# Patient Record
Sex: MALE | Race: WHITE | NOT HISPANIC OR LATINO | Employment: OTHER | URBAN - METROPOLITAN AREA
[De-identification: names, ages, dates, MRNs, and addresses within clinical notes are randomized per-mention and may not be internally consistent; named-entity substitution may affect disease eponyms.]

---

## 2017-01-06 DIAGNOSIS — E83.119 HEMOCHROMATOSIS, UNSPECIFIED HEMOCHROMATOSIS TYPE: Primary | ICD-10-CM

## 2017-01-10 ENCOUNTER — APPOINTMENT (OUTPATIENT)
Dept: LAB | Facility: HOSPITAL | Age: 67
End: 2017-01-10

## 2017-01-10 ENCOUNTER — APPOINTMENT (OUTPATIENT)
Dept: ONCOLOGY | Facility: HOSPITAL | Age: 67
End: 2017-01-10

## 2017-02-01 ENCOUNTER — APPOINTMENT (OUTPATIENT)
Dept: LAB | Facility: HOSPITAL | Age: 67
End: 2017-02-01

## 2017-02-01 ENCOUNTER — APPOINTMENT (OUTPATIENT)
Dept: ONCOLOGY | Facility: CLINIC | Age: 67
End: 2017-02-01

## 2017-02-01 ENCOUNTER — APPOINTMENT (OUTPATIENT)
Dept: ONCOLOGY | Facility: HOSPITAL | Age: 67
End: 2017-02-01

## 2017-02-03 ENCOUNTER — CLINICAL SUPPORT (OUTPATIENT)
Dept: ORTHOPEDIC SURGERY | Facility: CLINIC | Age: 67
End: 2017-02-03

## 2017-02-03 VITALS — TEMPERATURE: 97.6 F | BODY MASS INDEX: 37.65 KG/M2 | HEIGHT: 65 IN | WEIGHT: 226 LBS

## 2017-02-03 DIAGNOSIS — M25.562 CHRONIC PAIN OF LEFT KNEE: Primary | ICD-10-CM

## 2017-02-03 DIAGNOSIS — G89.29 CHRONIC PAIN OF LEFT KNEE: Primary | ICD-10-CM

## 2017-02-03 PROCEDURE — 20610 DRAIN/INJ JOINT/BURSA W/O US: CPT | Performed by: ORTHOPAEDIC SURGERY

## 2017-02-03 PROCEDURE — 73560 X-RAY EXAM OF KNEE 1 OR 2: CPT | Performed by: ORTHOPAEDIC SURGERY

## 2017-02-03 PROCEDURE — 73565 X-RAY EXAM OF KNEES: CPT | Performed by: ORTHOPAEDIC SURGERY

## 2017-02-03 RX ORDER — IBUPROFEN 800 MG/1
800 TABLET ORAL EVERY 6 HOURS PRN
COMMUNITY
Start: 2016-12-18 | End: 2019-11-12

## 2017-02-03 RX ORDER — FENOFIBRATE 134 MG/1
CAPSULE ORAL
COMMUNITY
Start: 2016-12-19 | End: 2018-01-25

## 2017-02-03 RX ADMIN — BUPIVACAINE HYDROCHLORIDE 2 ML: 5 INJECTION, SOLUTION PERINEURAL at 09:38

## 2017-02-03 RX ADMIN — LIDOCAINE HYDROCHLORIDE 2 ML: 10 INJECTION, SOLUTION INFILTRATION; PERINEURAL at 09:38

## 2017-02-03 RX ADMIN — METHYLPREDNISOLONE ACETATE 80 MG: 80 INJECTION, SUSPENSION INTRA-ARTICULAR; INTRALESIONAL; INTRAMUSCULAR; SOFT TISSUE at 09:38

## 2017-02-03 NOTE — PROGRESS NOTES
Knee Joint Injection      Patient: Loyd Avalos  YOB: 1950    Chief Complaints: Knee pain left    Subjective:  This problem is not new to this examiner.     History of Present Illness: Pt gets intermittent  injections with good relief. Is here for repeat injection. Understands options. The pain is a generalized joint line tenderness.  It has been progressive in nature but remains intermittent.  Worsened by prolonged standing or walking and squatting activities. Has had improvement in the past with ice/heat, rest, and injections. TIMING:  The pain is described as CHRONIC.  LOCATION: medial joint line tenderness. AGGRAVATING FACTORS:  Is worsened by prolonged standing, sitting, walking and squatting activities.  ASSOCIATED SYMPTOMS:  swelling, tenderness, and aching.      Allergies: No Known Allergies    Medications:   Home Medications:  Current Outpatient Prescriptions on File Prior to Visit   Medication Sig   • allopurinol (ZYLOPRIM) 300 MG tablet    • aspirin 81 MG EC tablet Take 81 mg by mouth daily.   • metFORMIN (GLUCOPHAGE) 500 MG tablet    • metoprolol succinate XL (TOPROL-XL) 25 MG 24 hr tablet    • [DISCONTINUED] fenofibric acid (TRILIPIX) 135 MG capsule delayed-release delayed release capsule      No current facility-administered medications on file prior to visit.      Current Medications:  Scheduled Meds:  Continuous Infusions:  No current facility-administered medications for this visit.   PRN Meds:.    I have reviewed the patient's medical history in detail and updated the computerized patient record.  Review and summarization of old records include:    Past Medical History   Diagnosis Date   • Diabetes    • Heart disease      Status post pacemaker implantation   • Hemochromatosis    • Sleep apnea         Past Surgical History   Procedure Laterality Date   • Knee surgery Right    • Pacemaker implantation  05/2006        Social History     Occupational History   • Retired       Social History Main Topics   • Smoking status: Current Some Day Smoker     Types: Cigars   • Smokeless tobacco: Not on file   • Alcohol use Yes      Comment: Moderate   • Drug use: No   • Sexual activity: Defer    Social History     Social History Narrative        Family History   Problem Relation Age of Onset   • Cancer Other      LYMPH & BREAST   • Diabetes Other    • Hypertension Other    • Heart disease Other    • Breast cancer Mother    • Lymphoma Father    • Hemochromatosis Father        ROS: 14 point review of systems was performed and was negative except for documented findings in HPI and today's encounter.     Allergies: No Known Allergies  Constitutional:  Denies fever, shaking or chills   Eyes:  Denies change in visual acuity   HENT:  Denies nasal congestion or sore throat   Respiratory:  Denies cough or shortness of breath   Cardiovascular:  Denies chest pain or severe LE edema   GI:  Denies abdominal pain, nausea, vomiting, bloody stools or diarrhea   Musculoskeletal:  Numbness, tingling, or loss of motor function only as noted above in history of present illness.  : Denies painful urination or hematuria  Integument:  Denies rash, lesion or ulceration   Neurologic:  Denies headache or focal weakness  Endocrine:  Denies lymphadenopathy  Psych:  Denies confusion or change in mental status   Hem:  Denies active bleeding    Physical Exam:  Body mass index is 37.61 kg/(m^2).  Vitals:    02/03/17 0935   Temp: 97.6 °F (36.4 °C)     Vital Signs:  reviewed  Constitutional: Awake alert and oriented x3, well developed, no acute distress, non-toxic appearance.  EYES: symmetric, sclera clear  ENT:  Normocephalic, Atraumatic.   Respiratory:  No respiratory distress, No wheezing  CV: pulse regular, no palpitations or pallor.  GI:  Abdomen soft, non-tender.   Vascular:  Intact distal pulses, No cyanosis, no signs or symptoms of DVT.  Neurologic: Sensation grossly intact to the involved extremity, No focal  deficits noted.   Neck: No tenderness, Supple.  Integument: warm, dry, no ulcerations.   Psychiatric:  Oriented, no pathological affect.  Musculoskeletal:    Affected knee(s):  Painful gait with a subtle limp, positive for synovitis, swelling, joint effusion with crepitation.  Lachman negative  Posterior drawer negative  Tereza's negative  Patellofemoral grind +  Sensation grossly intact to light touch throughout the lower extremity  Skin is intact  Distal pulses are palpable  No signs or symptoms of DVT        Diagnostic Data:     Imaging was done today and discussed at length with the patient:    Indication: pain related symptoms,  Views: 3V AP, LAT & 40 degree PA left knee(s)   Findings: advanced arthritis  Comparison views: viewed last xray done in the office.     Procedure:  Large Joint Arthrocentesis  Date/Time: 2/3/2017 9:38 AM  Consent given by: patient  Site marked: site marked  Timeout: Immediately prior to procedure a time out was called to verify the correct patient, procedure, equipment, support staff and site/side marked as required   Supporting Documentation  Indications: pain and joint swelling   Procedure Details  Location: knee - L knee  Preparation: Patient was prepped and draped in the usual sterile fashion  Needle size: 22 G  Approach: anterolateral  Medications administered: 2 mL bupivacaine; 2 mL lidocaine 1 %; 80 mg methylPREDNISolone acetate 80 MG/ML  Patient tolerance: patient tolerated the procedure well with no immediate complications          Assessment. Severe osteoarthritis left knee(s).      Plan: Is to proceed with injection  Follow up as indicated.  Ice, elevate, and rest as needed.  Additional interventions include: Cortisone Injection. See procedure note.    2/3/2017

## 2017-02-06 RX ORDER — METHYLPREDNISOLONE ACETATE 80 MG/ML
80 INJECTION, SUSPENSION INTRA-ARTICULAR; INTRALESIONAL; INTRAMUSCULAR; SOFT TISSUE
Status: COMPLETED | OUTPATIENT
Start: 2017-02-03 | End: 2017-02-03

## 2017-02-06 RX ORDER — LIDOCAINE HYDROCHLORIDE 10 MG/ML
2 INJECTION, SOLUTION INFILTRATION; PERINEURAL
Status: COMPLETED | OUTPATIENT
Start: 2017-02-03 | End: 2017-02-03

## 2017-02-06 RX ORDER — BUPIVACAINE HYDROCHLORIDE 5 MG/ML
2 INJECTION, SOLUTION PERINEURAL
Status: COMPLETED | OUTPATIENT
Start: 2017-02-03 | End: 2017-02-03

## 2017-02-08 ENCOUNTER — LAB (OUTPATIENT)
Dept: LAB | Facility: HOSPITAL | Age: 67
End: 2017-02-08

## 2017-02-08 ENCOUNTER — OFFICE VISIT (OUTPATIENT)
Dept: ONCOLOGY | Facility: CLINIC | Age: 67
End: 2017-02-08

## 2017-02-08 ENCOUNTER — APPOINTMENT (OUTPATIENT)
Dept: ONCOLOGY | Facility: HOSPITAL | Age: 67
End: 2017-02-08

## 2017-02-08 VITALS
BODY MASS INDEX: 35.16 KG/M2 | OXYGEN SATURATION: 95 % | RESPIRATION RATE: 12 BRPM | SYSTOLIC BLOOD PRESSURE: 140 MMHG | WEIGHT: 224 LBS | HEIGHT: 67 IN | HEART RATE: 86 BPM | DIASTOLIC BLOOD PRESSURE: 92 MMHG | TEMPERATURE: 97.8 F

## 2017-02-08 DIAGNOSIS — E83.110 HEREDITARY HEMOCHROMATOSIS (HCC): Primary | ICD-10-CM

## 2017-02-08 DIAGNOSIS — E83.119 HEMOCHROMATOSIS, UNSPECIFIED HEMOCHROMATOSIS TYPE: ICD-10-CM

## 2017-02-08 LAB
BASOPHILS # BLD AUTO: 0.08 10*3/MM3 (ref 0–0.1)
BASOPHILS NFR BLD AUTO: 0.9 % (ref 0–1.1)
DEPRECATED RDW RBC AUTO: 45 FL (ref 37–49)
EOSINOPHIL # BLD AUTO: 0.61 10*3/MM3 (ref 0–0.36)
EOSINOPHIL NFR BLD AUTO: 6.7 % (ref 1–5)
ERYTHROCYTE [DISTWIDTH] IN BLOOD BY AUTOMATED COUNT: 13.2 % (ref 11.7–14.5)
FERRITIN SERPL-MCNC: 117.1 NG/ML (ref 30–400)
HCT VFR BLD AUTO: 43.1 % (ref 40–49)
HGB BLD-MCNC: 14.6 G/DL (ref 13.5–16.5)
IMM GRANULOCYTES # BLD: 0.01 10*3/MM3 (ref 0–0.03)
IMM GRANULOCYTES NFR BLD: 0.1 % (ref 0–0.5)
LYMPHOCYTES # BLD AUTO: 3.3 10*3/MM3 (ref 1–3.5)
LYMPHOCYTES NFR BLD AUTO: 36 % (ref 20–49)
MCH RBC QN AUTO: 31.8 PG (ref 27–33)
MCHC RBC AUTO-ENTMCNC: 33.9 G/DL (ref 32–35)
MCV RBC AUTO: 93.9 FL (ref 83–97)
MONOCYTES # BLD AUTO: 0.63 10*3/MM3 (ref 0.25–0.8)
MONOCYTES NFR BLD AUTO: 6.9 % (ref 4–12)
NEUTROPHILS # BLD AUTO: 4.53 10*3/MM3 (ref 1.5–7)
NEUTROPHILS NFR BLD AUTO: 49.4 % (ref 39–75)
NRBC BLD MANUAL-RTO: 0 /100 WBC (ref 0–0)
PLATELET # BLD AUTO: 283 10*3/MM3 (ref 150–375)
PMV BLD AUTO: 9.4 FL (ref 8.9–12.1)
RBC # BLD AUTO: 4.59 10*6/MM3 (ref 4.3–5.5)
WBC NRBC COR # BLD: 9.16 10*3/MM3 (ref 4–10)

## 2017-02-08 PROCEDURE — 85025 COMPLETE CBC W/AUTO DIFF WBC: CPT

## 2017-02-08 PROCEDURE — 82728 ASSAY OF FERRITIN: CPT | Performed by: INTERNAL MEDICINE

## 2017-02-08 PROCEDURE — 99213 OFFICE O/P EST LOW 20 MIN: CPT | Performed by: INTERNAL MEDICINE

## 2017-02-08 PROCEDURE — 36415 COLL VENOUS BLD VENIPUNCTURE: CPT

## 2017-02-08 NOTE — PROGRESS NOTES
REASONS FOR FOLLOWUP:    hemochromatosis    HISTORY OF PRESENT ILLNESS:  The patient is a 66 y.o. year old male  who is here for follow-up with the above-mentioned history.He has been on c4ayopg phlebotomies with ferritin running 110-120.   I have requested a more stringent regimen briefly, every other month x 3 then every 3 month approach to phlebo's.    Past Medical History   Diagnosis Date   • Diabetes    • Heart disease      Status post pacemaker implantation   • Hemochromatosis    • Sleep apnea      Past Surgical History   Procedure Laterality Date   • Knee surgery Right    • Pacemaker implantation  05/2006       HEMATOLOGIC/ONCOLOGIC HISTORY:  (History from previous dates can be found in the separate document.)    MEDICATIONS    Current Outpatient Prescriptions:   •  allopurinol (ZYLOPRIM) 300 MG tablet, , Disp: , Rfl:   •  aspirin 81 MG EC tablet, Take 81 mg by mouth daily., Disp: , Rfl:   •  fenofibrate micronized (LOFIBRA) 134 MG capsule, , Disp: , Rfl:   •  ibuprofen (ADVIL,MOTRIN) 800 MG tablet, , Disp: , Rfl:   •  metFORMIN (GLUCOPHAGE) 500 MG tablet, , Disp: , Rfl:   •  metoprolol succinate XL (TOPROL-XL) 25 MG 24 hr tablet, , Disp: , Rfl:     ALLERGIES:   No Known Allergies    SOCIAL HISTORY:       Social History     Social History   • Marital status:      Spouse name: N/A   • Number of children: N/A   • Years of education: High School     Occupational History   • Retired      Social History Main Topics   • Smoking status: Current Some Day Smoker     Types: Cigars   • Smokeless tobacco: Not on file   • Alcohol use Yes      Comment: Moderate   • Drug use: No   • Sexual activity: Defer     Other Topics Concern   • Not on file     Social History Narrative         FAMILY HISTORY:  Family History   Problem Relation Age of Onset   • Cancer Other      LYMPH & BREAST   • Diabetes Other    • Hypertension Other    • Heart disease Other    • Breast cancer Mother    • Lymphoma Father    • Hemochromatosis  "Father        REVIEW OF SYSTEMS:  GENERAL: No change in appetite or weight;   No fevers, chills, sweats.    SKIN: No nonhealing lesions.   No rashes.  HEME/LYMPH: No easy bruising, bleeding.   No swollen nodes.   EYES: No vision changes or diplopia.   ENT: No tinnitus, hearing loss, gum bleeding, epistaxis, hoarseness or dysphagia.   RESPIRATORY: No cough, shortness of breath, hemoptysis or wheezing.   CVS: No chest pain, palpitations, orthopnea, dyspnea on exertion or PND.   GI: No melena or hematochezia.   No abdominal pain.  No nausea, vomiting, constipation, diarrhea  : No lower tract obstructive symptoms, dysuria or hematuria.   MUSCULOSKELETAL: No bone pain.  No joint stiffness.   NEUROLOGICAL: No global weakness, loss of consciousness or seizures.   PSYCHIATRIC: No increased nervousness, mood changes or depression.     Objective    Vitals:    02/08/17 1255   BP: 140/92   Pulse: 86   Resp: 12   Temp: 97.8 °F (36.6 °C)   TempSrc: Oral   SpO2: 95%   Weight: 224 lb (102 kg)   Height: 66.93\" (170 cm)  Comment: new height   PainSc: 0-No pain     Current Status 2/8/2017   ECOG score 0      PHYSICAL EXAM:    GENERAL:  Well-developed, well-nourished in no acute distress.   SKIN:  Warm, dry without rashes, purpura or petechiae.  EYES:  Pupils equal, round and reactive to light.  EOMs intact.  Conjunctivae normal.  EARS:  Hearing intact.  NOSE:  Septum midline.  No excoriations or nasal discharge.  MOUTH:  Tongue is well-papillated; no stomatitis or ulcers.  Lips normal.  THROAT:  Oropharynx without lesions or exudates.  NECK:  Supple with good range of motion; no thyromegaly or masses, no JVD.  LYMPHATICS:  No cervical, supraclavicular, axillary or inguinal adenopathy.  CHEST:  Lungs clear to auscultation. Good airflow.  CARDIAC:  Regular rate and rhythm without murmurs, rubs or gallops. Normal S1,S2.  ABDOMEN:  Soft, nontender with no hepatosplenomegaly or masses.  EXTREMITIES:  No clubbing, cyanosis or " edema.  NEUROLOGICAL:  Cranial Nerves II-XII grossly intact.  No focal neurological deficits.  PSYCHIATRIC:  Normal affect and mood.     RECENT LABS:        WBC   Date/Time Value Ref Range Status   02/08/2017 12:44 PM 9.16 4.00 - 10.00 10*3/mm3 Final     HEMOGLOBIN   Date/Time Value Ref Range Status   02/08/2017 12:44 PM 14.6 13.5 - 16.5 g/dL Final     PLATELETS   Date/Time Value Ref Range Status   02/08/2017 12:44  150 - 375 10*3/mm3 Final       Assessment/Plan      Hereditary hemochromatosis  He is stable currently, albeit at a higher baseline than I request. Nonetheless,we will transiently increase the frequency of phlebo's to every other month x3(6months) then resume quarterly.

## 2017-02-15 ENCOUNTER — APPOINTMENT (OUTPATIENT)
Dept: ONCOLOGY | Facility: HOSPITAL | Age: 67
End: 2017-02-15

## 2017-04-06 DIAGNOSIS — E83.110 HEREDITARY HEMOCHROMATOSIS (HCC): Primary | ICD-10-CM

## 2017-04-12 ENCOUNTER — LAB (OUTPATIENT)
Dept: LAB | Facility: HOSPITAL | Age: 67
End: 2017-04-12

## 2017-04-12 ENCOUNTER — INFUSION (OUTPATIENT)
Dept: ONCOLOGY | Facility: HOSPITAL | Age: 67
End: 2017-04-12

## 2017-04-12 VITALS
DIASTOLIC BLOOD PRESSURE: 78 MMHG | TEMPERATURE: 98.2 F | BODY MASS INDEX: 35.94 KG/M2 | SYSTOLIC BLOOD PRESSURE: 131 MMHG | WEIGHT: 229 LBS | HEART RATE: 98 BPM

## 2017-04-12 DIAGNOSIS — E83.110 HEREDITARY HEMOCHROMATOSIS (HCC): ICD-10-CM

## 2017-04-12 DIAGNOSIS — E83.118 OTHER HEMOCHROMATOSIS: Primary | ICD-10-CM

## 2017-04-12 LAB
BASOPHILS # BLD AUTO: 0.11 10*3/MM3 (ref 0–0.1)
BASOPHILS NFR BLD AUTO: 1.1 % (ref 0–1.1)
DEPRECATED RDW RBC AUTO: 44.8 FL (ref 37–49)
EOSINOPHIL # BLD AUTO: 0.49 10*3/MM3 (ref 0–0.36)
EOSINOPHIL NFR BLD AUTO: 4.7 % (ref 1–5)
ERYTHROCYTE [DISTWIDTH] IN BLOOD BY AUTOMATED COUNT: 12.9 % (ref 11.7–14.5)
FERRITIN SERPL-MCNC: 114.9 NG/ML (ref 30–400)
HCT VFR BLD AUTO: 43.1 % (ref 40–49)
HGB BLD-MCNC: 14.6 G/DL (ref 13.5–16.5)
IMM GRANULOCYTES # BLD: 0.01 10*3/MM3 (ref 0–0.03)
IMM GRANULOCYTES NFR BLD: 0.1 % (ref 0–0.5)
LYMPHOCYTES # BLD AUTO: 4.48 10*3/MM3 (ref 1–3.5)
LYMPHOCYTES NFR BLD AUTO: 43.2 % (ref 20–49)
MCH RBC QN AUTO: 32.4 PG (ref 27–33)
MCHC RBC AUTO-ENTMCNC: 33.9 G/DL (ref 32–35)
MCV RBC AUTO: 95.8 FL (ref 83–97)
MONOCYTES # BLD AUTO: 0.85 10*3/MM3 (ref 0.25–0.8)
MONOCYTES NFR BLD AUTO: 8.2 % (ref 4–12)
NEUTROPHILS # BLD AUTO: 4.42 10*3/MM3 (ref 1.5–7)
NEUTROPHILS NFR BLD AUTO: 42.7 % (ref 39–75)
NRBC BLD MANUAL-RTO: 0 /100 WBC (ref 0–0)
PLATELET # BLD AUTO: 272 10*3/MM3 (ref 150–375)
PMV BLD AUTO: 9.6 FL (ref 8.9–12.1)
RBC # BLD AUTO: 4.5 10*6/MM3 (ref 4.3–5.5)
WBC NRBC COR # BLD: 10.36 10*3/MM3 (ref 4–10)

## 2017-04-12 PROCEDURE — 85025 COMPLETE CBC W/AUTO DIFF WBC: CPT

## 2017-04-12 PROCEDURE — 36415 COLL VENOUS BLD VENIPUNCTURE: CPT

## 2017-04-12 PROCEDURE — 99195 PHLEBOTOMY: CPT | Performed by: INTERNAL MEDICINE

## 2017-04-12 PROCEDURE — 82728 ASSAY OF FERRITIN: CPT

## 2017-05-04 ENCOUNTER — CLINICAL SUPPORT (OUTPATIENT)
Dept: ORTHOPEDIC SURGERY | Facility: CLINIC | Age: 67
End: 2017-05-04

## 2017-05-04 VITALS — WEIGHT: 225 LBS | TEMPERATURE: 98 F | BODY MASS INDEX: 35.31 KG/M2 | HEIGHT: 67 IN

## 2017-05-04 DIAGNOSIS — M17.12 ARTHRITIS OF LEFT KNEE: Primary | ICD-10-CM

## 2017-05-04 PROCEDURE — 20610 DRAIN/INJ JOINT/BURSA W/O US: CPT | Performed by: NURSE PRACTITIONER

## 2017-05-04 RX ORDER — METHYLPREDNISOLONE ACETATE 80 MG/ML
80 INJECTION, SUSPENSION INTRA-ARTICULAR; INTRALESIONAL; INTRAMUSCULAR; SOFT TISSUE
Status: COMPLETED | OUTPATIENT
Start: 2017-05-04 | End: 2017-05-04

## 2017-05-04 RX ORDER — BUPIVACAINE HYDROCHLORIDE 2.5 MG/ML
2 INJECTION, SOLUTION INFILTRATION; PERINEURAL
Status: COMPLETED | OUTPATIENT
Start: 2017-05-04 | End: 2017-05-04

## 2017-05-04 RX ORDER — LIDOCAINE HYDROCHLORIDE 10 MG/ML
2 INJECTION, SOLUTION INFILTRATION; PERINEURAL
Status: COMPLETED | OUTPATIENT
Start: 2017-05-04 | End: 2017-05-04

## 2017-05-04 RX ORDER — LISINOPRIL 10 MG/1
10 TABLET ORAL DAILY
COMMUNITY
Start: 2017-05-02

## 2017-05-04 RX ADMIN — METHYLPREDNISOLONE ACETATE 80 MG: 80 INJECTION, SUSPENSION INTRA-ARTICULAR; INTRALESIONAL; INTRAMUSCULAR; SOFT TISSUE at 10:07

## 2017-05-04 RX ADMIN — BUPIVACAINE HYDROCHLORIDE 2 ML: 2.5 INJECTION, SOLUTION INFILTRATION; PERINEURAL at 10:07

## 2017-05-04 RX ADMIN — LIDOCAINE HYDROCHLORIDE 2 ML: 10 INJECTION, SOLUTION INFILTRATION; PERINEURAL at 10:07

## 2017-06-05 DIAGNOSIS — E83.110 HEREDITARY HEMOCHROMATOSIS (HCC): Primary | ICD-10-CM

## 2017-06-05 RX ORDER — SODIUM CHLORIDE 9 MG/ML
250 INJECTION, SOLUTION INTRAVENOUS ONCE
Status: CANCELLED | OUTPATIENT
Start: 2017-06-07

## 2017-06-07 ENCOUNTER — INFUSION (OUTPATIENT)
Dept: ONCOLOGY | Facility: HOSPITAL | Age: 67
End: 2017-06-07

## 2017-06-07 ENCOUNTER — LAB (OUTPATIENT)
Dept: LAB | Facility: HOSPITAL | Age: 67
End: 2017-06-07

## 2017-06-07 VITALS
WEIGHT: 220 LBS | SYSTOLIC BLOOD PRESSURE: 127 MMHG | HEART RATE: 88 BPM | DIASTOLIC BLOOD PRESSURE: 81 MMHG | TEMPERATURE: 98.4 F | BODY MASS INDEX: 34.46 KG/M2

## 2017-06-07 DIAGNOSIS — E83.119 HEMOCHROMATOSIS, UNSPECIFIED HEMOCHROMATOSIS TYPE: Primary | ICD-10-CM

## 2017-06-07 DIAGNOSIS — E83.110 HEREDITARY HEMOCHROMATOSIS (HCC): Primary | ICD-10-CM

## 2017-06-07 DIAGNOSIS — E83.110 HEREDITARY HEMOCHROMATOSIS (HCC): ICD-10-CM

## 2017-06-07 LAB
BASOPHILS # BLD AUTO: 0.09 10*3/MM3 (ref 0–0.1)
BASOPHILS NFR BLD AUTO: 1 % (ref 0–1.1)
DEPRECATED RDW RBC AUTO: 45 FL (ref 37–49)
EOSINOPHIL # BLD AUTO: 0.49 10*3/MM3 (ref 0–0.36)
EOSINOPHIL NFR BLD AUTO: 5.5 % (ref 1–5)
ERYTHROCYTE [DISTWIDTH] IN BLOOD BY AUTOMATED COUNT: 12.6 % (ref 11.7–14.5)
FERRITIN SERPL-MCNC: 129.5 NG/ML (ref 30–400)
HCT VFR BLD AUTO: 42.4 % (ref 40–49)
HGB BLD-MCNC: 14.4 G/DL (ref 13.5–16.5)
HOLD SPECIMEN: NORMAL
IMM GRANULOCYTES # BLD: 0.02 10*3/MM3 (ref 0–0.03)
IMM GRANULOCYTES NFR BLD: 0.2 % (ref 0–0.5)
LYMPHOCYTES # BLD AUTO: 3.09 10*3/MM3 (ref 1–3.5)
LYMPHOCYTES NFR BLD AUTO: 34.6 % (ref 20–49)
MCH RBC QN AUTO: 33 PG (ref 27–33)
MCHC RBC AUTO-ENTMCNC: 34 G/DL (ref 32–35)
MCV RBC AUTO: 97.2 FL (ref 83–97)
MONOCYTES # BLD AUTO: 0.63 10*3/MM3 (ref 0.25–0.8)
MONOCYTES NFR BLD AUTO: 7.1 % (ref 4–12)
NEUTROPHILS # BLD AUTO: 4.61 10*3/MM3 (ref 1.5–7)
NEUTROPHILS NFR BLD AUTO: 51.6 % (ref 39–75)
NRBC BLD MANUAL-RTO: 0 /100 WBC (ref 0–0)
PLATELET # BLD AUTO: 300 10*3/MM3 (ref 150–375)
PMV BLD AUTO: 9.5 FL (ref 8.9–12.1)
RBC # BLD AUTO: 4.36 10*6/MM3 (ref 4.3–5.5)
WBC NRBC COR # BLD: 8.93 10*3/MM3 (ref 4–10)

## 2017-06-07 PROCEDURE — 99195 PHLEBOTOMY: CPT | Performed by: INTERNAL MEDICINE

## 2017-06-07 PROCEDURE — 36415 COLL VENOUS BLD VENIPUNCTURE: CPT

## 2017-06-07 PROCEDURE — 85025 COMPLETE CBC W/AUTO DIFF WBC: CPT

## 2017-06-07 PROCEDURE — 82728 ASSAY OF FERRITIN: CPT

## 2017-06-07 RX ORDER — SODIUM CHLORIDE 9 MG/ML
250 INJECTION, SOLUTION INTRAVENOUS ONCE
Status: CANCELLED | OUTPATIENT
Start: 2017-06-07

## 2017-08-03 ENCOUNTER — CLINICAL SUPPORT (OUTPATIENT)
Dept: ORTHOPEDIC SURGERY | Facility: CLINIC | Age: 67
End: 2017-08-03

## 2017-08-03 VITALS — HEIGHT: 67 IN | BODY MASS INDEX: 34.69 KG/M2 | WEIGHT: 221 LBS | TEMPERATURE: 97.6 F

## 2017-08-03 DIAGNOSIS — M17.12 ARTHRITIS OF LEFT KNEE: ICD-10-CM

## 2017-08-03 DIAGNOSIS — M25.562 CHRONIC PAIN OF LEFT KNEE: Primary | ICD-10-CM

## 2017-08-03 DIAGNOSIS — G89.29 CHRONIC PAIN OF LEFT KNEE: Primary | ICD-10-CM

## 2017-08-03 PROCEDURE — 73562 X-RAY EXAM OF KNEE 3: CPT | Performed by: NURSE PRACTITIONER

## 2017-08-03 PROCEDURE — 20610 DRAIN/INJ JOINT/BURSA W/O US: CPT | Performed by: NURSE PRACTITIONER

## 2017-08-03 RX ORDER — METHYLPREDNISOLONE ACETATE 80 MG/ML
80 INJECTION, SUSPENSION INTRA-ARTICULAR; INTRALESIONAL; INTRAMUSCULAR; SOFT TISSUE
Status: COMPLETED | OUTPATIENT
Start: 2017-08-03 | End: 2017-08-03

## 2017-08-03 RX ORDER — LIDOCAINE HYDROCHLORIDE 10 MG/ML
2 INJECTION, SOLUTION INFILTRATION; PERINEURAL
Status: COMPLETED | OUTPATIENT
Start: 2017-08-03 | End: 2017-08-03

## 2017-08-03 RX ORDER — BUPIVACAINE HYDROCHLORIDE 5 MG/ML
2 INJECTION, SOLUTION PERINEURAL
Status: COMPLETED | OUTPATIENT
Start: 2017-08-03 | End: 2017-08-03

## 2017-08-03 RX ADMIN — LIDOCAINE HYDROCHLORIDE 2 ML: 10 INJECTION, SOLUTION INFILTRATION; PERINEURAL at 09:51

## 2017-08-03 RX ADMIN — METHYLPREDNISOLONE ACETATE 80 MG: 80 INJECTION, SUSPENSION INTRA-ARTICULAR; INTRALESIONAL; INTRAMUSCULAR; SOFT TISSUE at 09:51

## 2017-08-03 RX ADMIN — BUPIVACAINE HYDROCHLORIDE 2 ML: 5 INJECTION, SOLUTION PERINEURAL at 09:51

## 2017-08-03 NOTE — PROGRESS NOTES
Knee Follow Up Visit      Patient: Loyd Avalos  YOB: 1950    Chief Complaints:  left knee pain    Subjective:    History of Present Illness: Here today for knee pain. The pain is a generalized joint tenderness.  It has been progressive in nature but remains intermittent.  Worsened by prolonged standing or walking and squatting activities. Has had improvement in the past with ice/heat, rest, and injections.     This problem is not new to this examiner.     Allergies: No Known Allergies    Medications:   Home Medications:  Current Outpatient Prescriptions on File Prior to Visit   Medication Sig   • allopurinol (ZYLOPRIM) 300 MG tablet    • aspirin 81 MG EC tablet Take 81 mg by mouth daily.   • fenofibrate micronized (LOFIBRA) 134 MG capsule    • ibuprofen (ADVIL,MOTRIN) 800 MG tablet    • lisinopril (PRINIVIL,ZESTRIL) 10 MG tablet    • metFORMIN (GLUCOPHAGE) 500 MG tablet    • metoprolol succinate XL (TOPROL-XL) 25 MG 24 hr tablet      No current facility-administered medications on file prior to visit.      Current Medications:  Scheduled Meds:  Continuous Infusions:  No current facility-administered medications for this visit.   PRN Meds:.    I have reviewed the patient's medical history in detail and updated the computerized patient record.  Review and summarization of old records include:    Past Medical History:   Diagnosis Date   • Diabetes    • Heart disease     Status post pacemaker implantation   • Hemochromatosis    • Sleep apnea         Past Surgical History:   Procedure Laterality Date   • KNEE SURGERY Right    • PACEMAKER IMPLANTATION  05/2006        Social History     Occupational History   • Retired      Social History Main Topics   • Smoking status: Current Some Day Smoker     Types: Cigars   • Smokeless tobacco: Not on file   • Alcohol use Yes      Comment: Moderate   • Drug use: No   • Sexual activity: Defer    Social History     Social History Narrative        Family History    Problem Relation Age of Onset   • Cancer Other      LYMPH & BREAST   • Diabetes Other    • Hypertension Other    • Heart disease Other    • Breast cancer Mother    • Lymphoma Father    • Hemochromatosis Father        ROS: 14 point review of systems was performed and was negative except for documented findings in HPI and today's encounter.     Allergies: No Known Allergies  Constitutional:  Denies fever, shaking or chills   Eyes:  Denies change in visual acuity   HENT:  Denies nasal congestion or sore throat   Respiratory:  Denies cough or shortness of breath   Cardiovascular:  Denies chest pain or severe LE edema   GI:  Denies abdominal pain, nausea, vomiting, bloody stools or diarrhea   Musculoskeletal:  Numbness, tingling, or loss of motor function only as noted above in history of present illness.  : Denies painful urination or hematuria  Integument:  Denies rash, lesion or ulceration   Neurologic:  Denies headache or focal weakness  Endocrine:  Denies lymphadenopathy  Psych:  Denies confusion or change in mental status   Hem:  Denies active bleeding    Physical Exam:  Body mass index is 34.61 kg/(m^2).  Vitals:    08/03/17 0949   Temp: 97.6 °F (36.4 °C)     Vital Signs:  reviewed  Constitutional: Awake alert and oriented x3, well developed, no acute distress, non-toxic appearance.  EYES: symmetric, sclera clear  ENT:  Normocephalic, Atraumatic.   Respiratory:  No respiratory distress, No wheezing  CV: pulse regular, no palpitations or pallor.  GI:  Abdomen soft, non-tender.   Vascular:  Intact distal pulses, No cyanosis, no signs or symptoms of DVT.  Neurologic: Sensation grossly intact to the involved extremity, No focal deficits noted.   Neck: No tenderness, Supple.  Integument: warm, dry, no ulcerations.   Psychiatric:  Oriented, no pathological affect.  Musculoskeletal:    Affected knee(s):  Painful gait with a subtle limp, positive for synovitis, swelling, joint effusion with crepitation.  Lachman  negative  Posterior drawer negative  Tereza's negative  Patellofemoral grind +  Sensation grossly intact to light touch throughout the lower extremity  Skin is intact  Distal pulses are palpable  No signs or symptoms of DVT        Diagnostic Data:     Imaging was done today and discussed at length with the patient:    Indication: pain related symptoms,  Views: 3V AP, LAT & 40 degree PA left knee(s)   Findings: severe end-stage arthritis (bone on bone, subchondral sclerosis/cysts, osteophytes)  Comparison views: viewed last xray done in the office.     Procedure:  Large Joint Arthrocentesis  Date/Time: 8/3/2017 9:51 AM  Consent given by: patient  Site marked: site marked  Timeout: Immediately prior to procedure a time out was called to verify the correct patient, procedure, equipment, support staff and site/side marked as required   Supporting Documentation  Indications: pain and joint swelling   Procedure Details  Location: knee - L knee  Preparation: Patient was prepped and draped in the usual sterile fashion  Needle size: 22 G  Approach: anterolateral  Medications administered: 2 mL bupivacaine; 2 mL lidocaine 1 %; 80 mg methylPREDNISolone acetate 80 MG/ML  Patient tolerance: patient tolerated the procedure well with no immediate complications          Assessment. Severe arthritis left knee(s).      Plan: Is to proceed with injection  Follow up as indicated.  Ice, elevate, and rest as needed.  Additional interventions include: Biomechanics of pertinent body area discussed.  Risks, benefits, alternatives, comparisons, and complications of accepted medicines, injections, recommendations, surgical procedures, and therapies explained and education provided in laymen's terms. The patient was given the opportunity to ask questions and they were answerved to their satisfaction.   Natural history and expected course discussed. Questions answered.  Advice on benefits of, and types of regular/moderate exercise.  Lifestyle  measures for weight loss with biomechanical explanations for weight loss and how this affects orthopedic condition.  Cortisone Injection. See procedure note.  OTC analgesics as needed with dosage warning and instructions given.  Cryotherapy/brachy therapy as indicated with instructions.   10 min spent face to face with patient >5 min spent counseling about natural history and expected course of assessed complaint. Questions answered.  Advised on knee strengthening exercises, stressed importance of these with progression of arthritis, demonstrated exercises to patient with repeat demonstration and verb of understanding.   Pertinent educational materials distributed.    8/3/2017  JAX

## 2017-08-03 NOTE — PATIENT INSTRUCTIONS
[facility]    KNEE HOME EXERCISES      It is important that you maintain and possibly improve your strength and range of motion of your knee.      •  Walk frequently for short intervals  •  Using a cane or walker to allow you to walk safely if needed  •  Avoid sitting for long periods of time to avoid cramping and swelling of your leg   •  Do exercises either on a bed or in a chair.  •  If any of the exercises cause you pain, either eliminate that exercise or decrease          the motion or repetitions      Start exercises with 10 repetitions and increase to 30 repetitions.  Do two sets of each exercise each day    ANKLE PUMPS    1. Move your feet up and down as if you are pumping on a gas pedal       STRAIGHT LEG RAISES    1. Lie flat with your injured leg straight.  Keep the other leg bent.  2. Tighten the injured leg's thigh muscle.  3. Lift leg, with knee locked in the straight position no higher than the other knee for  seconds  4. Lower leg slowly. Rest for 5 seconds      SHORT ARC QUAD    1. Lie with both knees bent over a pillow  2. Straighten both knees  3. Hold 5 seconds  4. Bend knees back to starting position and repeat sequence       QUADRICEPS     1. Lie flat with your injured let straight.  Keep the other leg bent.  2. Tighten the injured leg's thigh muscle by trying to move your kneecap toward the  thigh.  3. Make your leg as stiff as you can.  4. Hold for 5 seconds and relax for 5 seconds        HAMSTRING STRETCH    1. Lie flat with your injured leg straight. Keep the other leg bent  2. Press your heel of your injured leg into the floor for 5 seconds       OR  1. Sit with injured leg out straight.   2. Loop a sheet around the ball of foot and toes.  3. Gently pull on the sheet, keeping the leg straight  4. Hold for 10-30 seconds      KNEE FLEXION-EXTENSION SITTING     1. Sit with injured let bent as shown  2. Straighten leg at the knee  3. Hold 5 seconds  4. Bend knee back  to starting position   5. Rest for 2-5 seconds and repeat sequence       HEEL SLIDES     1. Lie on back with legs straight  2. Slide heels toward buttocks  3. Return to starting position       HEEL SLIDS WITH SHEET    1. Lie on your back with a sheet wrapped around your foot  2. Pull on the sheet to assist you in bending your knee and sliding your heel toward  your buttocks  3. Hold for 5 seconds        KNEE FLEXION STRETCH    1. Sit in a chair  2. Bend bad knee back as far as you can   3. Hold stretch for 5-10 seconds      CHAIR PUSH UPS    1. Sit in a chair with arm rests  2. Place hands on armrests  3. Straighten arms, raising buttocks up off of chair         WALL SLIDES    1. Stand with your back and head against a wall.    2. Keep your feet shoulder width apart and 6-12 inches from the wall  3. Slowly slide down the wall until your knees are slightly bent.    4. Hold for 5 seconds and then slowly slide back up  5. As you get stronger, then you can slowly increase the bend in your knees, but do  not drop your buttocks below the level of your knees       STEP UPS    1. Stand with your foot on your injured leg on a 3-5 inch support (such as a block of  wood)  2. Place other foot on the floor  3. Shift your weight onto the foot on the block and try to straighten the knee and  raising the other foot off of the floor  4. Slowly lower your foot until only the heel touches the floor

## 2017-08-07 DIAGNOSIS — E83.119 HEMOCHROMATOSIS, UNSPECIFIED HEMOCHROMATOSIS TYPE: Primary | ICD-10-CM

## 2017-08-10 ENCOUNTER — LAB (OUTPATIENT)
Dept: LAB | Facility: HOSPITAL | Age: 67
End: 2017-08-10

## 2017-08-10 ENCOUNTER — INFUSION (OUTPATIENT)
Dept: ONCOLOGY | Facility: HOSPITAL | Age: 67
End: 2017-08-10

## 2017-08-10 ENCOUNTER — OFFICE VISIT (OUTPATIENT)
Dept: ONCOLOGY | Facility: CLINIC | Age: 67
End: 2017-08-10

## 2017-08-10 VITALS
DIASTOLIC BLOOD PRESSURE: 90 MMHG | OXYGEN SATURATION: 97 % | SYSTOLIC BLOOD PRESSURE: 154 MMHG | BODY MASS INDEX: 34.88 KG/M2 | HEIGHT: 67 IN | WEIGHT: 222.2 LBS | RESPIRATION RATE: 18 BRPM | TEMPERATURE: 98.1 F | HEART RATE: 82 BPM

## 2017-08-10 DIAGNOSIS — E83.119 HEMOCHROMATOSIS, UNSPECIFIED HEMOCHROMATOSIS TYPE: ICD-10-CM

## 2017-08-10 DIAGNOSIS — E83.119 HEMOCHROMATOSIS, UNSPECIFIED HEMOCHROMATOSIS TYPE: Primary | ICD-10-CM

## 2017-08-10 LAB
BASOPHILS # BLD AUTO: 0.1 10*3/MM3 (ref 0–0.1)
BASOPHILS NFR BLD AUTO: 1.1 % (ref 0–1.1)
DEPRECATED RDW RBC AUTO: 47.6 FL (ref 37–49)
EOSINOPHIL # BLD AUTO: 0.54 10*3/MM3 (ref 0–0.36)
EOSINOPHIL NFR BLD AUTO: 6 % (ref 1–5)
ERYTHROCYTE [DISTWIDTH] IN BLOOD BY AUTOMATED COUNT: 13.2 % (ref 11.7–14.5)
FERRITIN SERPL-MCNC: 48.8 NG/ML (ref 30–400)
HCT VFR BLD AUTO: 42.8 % (ref 40–49)
HGB BLD-MCNC: 14.4 G/DL (ref 13.5–16.5)
IMM GRANULOCYTES # BLD: 0.03 10*3/MM3 (ref 0–0.03)
IMM GRANULOCYTES NFR BLD: 0.3 % (ref 0–0.5)
LYMPHOCYTES # BLD AUTO: 3.77 10*3/MM3 (ref 1–3.5)
LYMPHOCYTES NFR BLD AUTO: 41.7 % (ref 20–49)
MCH RBC QN AUTO: 32.7 PG (ref 27–33)
MCHC RBC AUTO-ENTMCNC: 33.6 G/DL (ref 32–35)
MCV RBC AUTO: 97.3 FL (ref 83–97)
MONOCYTES # BLD AUTO: 0.94 10*3/MM3 (ref 0.25–0.8)
MONOCYTES NFR BLD AUTO: 10.4 % (ref 4–12)
NEUTROPHILS # BLD AUTO: 3.66 10*3/MM3 (ref 1.5–7)
NEUTROPHILS NFR BLD AUTO: 40.5 % (ref 39–75)
NRBC BLD MANUAL-RTO: 0 /100 WBC (ref 0–0)
PLATELET # BLD AUTO: 266 10*3/MM3 (ref 150–375)
PMV BLD AUTO: 9.6 FL (ref 8.9–12.1)
RBC # BLD AUTO: 4.4 10*6/MM3 (ref 4.3–5.5)
WBC NRBC COR # BLD: 9.04 10*3/MM3 (ref 4–10)

## 2017-08-10 PROCEDURE — 82728 ASSAY OF FERRITIN: CPT | Performed by: INTERNAL MEDICINE

## 2017-08-10 PROCEDURE — 36415 COLL VENOUS BLD VENIPUNCTURE: CPT | Performed by: INTERNAL MEDICINE

## 2017-08-10 PROCEDURE — 99212 OFFICE O/P EST SF 10 MIN: CPT | Performed by: INTERNAL MEDICINE

## 2017-08-10 PROCEDURE — 85025 COMPLETE CBC W/AUTO DIFF WBC: CPT | Performed by: INTERNAL MEDICINE

## 2017-08-10 NOTE — PROGRESS NOTES
FERRITIN TODAY IS 48.8. REVIEWED WITH DR OLIVA AND NO PHLEBO TODAY AND PATIENT TO KEEP HIS CURRENT SCHEDULE. PATIENT V/U.

## 2017-08-10 NOTE — PROGRESS NOTES
REASONS FOR FOLLOWUP:    hemochromatosis    HISTORY OF PRESENT ILLNESS:  The patient is a 67 y.o. year old male  who is here for follow-up with the above-mentioned history.  He has undergone phlebotomy of 500 cc every other month in an effort to reduce his ferritin I have requested a more stringent regimen briefly, every other month x 3 then every 3 month approach to phlebo's.    Past Medical History:   Diagnosis Date   • Diabetes    • Heart disease     Status post pacemaker implantation   • Hemochromatosis    • Sleep apnea      Past Surgical History:   Procedure Laterality Date   • KNEE SURGERY Right    • PACEMAKER IMPLANTATION  05/2006       HEMATOLOGIC/ONCOLOGIC HISTORY:  (History from previous dates can be found in the separate document.)    MEDICATIONS    Current Outpatient Prescriptions:   •  allopurinol (ZYLOPRIM) 300 MG tablet, , Disp: , Rfl:   •  aspirin 81 MG EC tablet, Take 81 mg by mouth daily., Disp: , Rfl:   •  fenofibrate micronized (LOFIBRA) 134 MG capsule, , Disp: , Rfl:   •  ibuprofen (ADVIL,MOTRIN) 800 MG tablet, , Disp: , Rfl:   •  lisinopril (PRINIVIL,ZESTRIL) 10 MG tablet, , Disp: , Rfl:   •  metFORMIN (GLUCOPHAGE) 500 MG tablet, , Disp: , Rfl:   •  metoprolol succinate XL (TOPROL-XL) 25 MG 24 hr tablet, , Disp: , Rfl:     ALLERGIES:   No Known Allergies    SOCIAL HISTORY:       Social History     Social History   • Marital status:      Spouse name: N/A   • Number of children: N/A   • Years of education: High School     Occupational History   • Retired      Social History Main Topics   • Smoking status: Current Some Day Smoker     Types: Cigars   • Smokeless tobacco: Not on file   • Alcohol use Yes      Comment: Moderate   • Drug use: No   • Sexual activity: Defer     Other Topics Concern   • Not on file     Social History Narrative         FAMILY HISTORY:  Family History   Problem Relation Age of Onset   • Cancer Other      LYMPH & BREAST   • Diabetes Other    • Hypertension Other    •  "Heart disease Other    • Breast cancer Mother    • Lymphoma Father    • Hemochromatosis Father        REVIEW OF SYSTEMS:  GENERAL: No change in appetite or weight;   No fevers, chills, sweats.    SKIN: No nonhealing lesions.   No rashes.  HEME/LYMPH: No easy bruising, bleeding.   No swollen nodes.   EYES: No vision changes or diplopia.   ENT: No tinnitus, hearing loss, gum bleeding, epistaxis, hoarseness or dysphagia.   RESPIRATORY: No cough, shortness of breath, hemoptysis or wheezing.   CVS: No chest pain, palpitations, orthopnea, dyspnea on exertion or PND.   GI: No melena or hematochezia.   No abdominal pain.  No nausea, vomiting, constipation, diarrhea  : No lower tract obstructive symptoms, dysuria or hematuria.   MUSCULOSKELETAL: No bone pain.  No joint stiffness.   NEUROLOGICAL: No global weakness, loss of consciousness or seizures.   PSYCHIATRIC: No increased nervousness, mood changes or depression.     Objective    Vitals:    08/10/17 0950   BP: 154/90   Pulse: 82   Resp: 18   Temp: 98.1 °F (36.7 °C)   TempSrc: Oral   SpO2: 97%   Weight: 222 lb 3.2 oz (101 kg)   Height: 67\" (170.2 cm)   PainSc: 5  Comment: Lt knee pain   PainLoc: Knee     Current Status 8/10/2017   ECOG score 0      PHYSICAL EXAM:    GENERAL:  Well-developed, well-nourished in no acute distress.   SKIN:  Warm, dry without rashes, purpura or petechiae.  EYES:  Pupils equal, round and reactive to light.  EOMs intact.  Conjunctivae normal.  EARS:  Hearing intact.  NOSE:  Septum midline.  No excoriations or nasal discharge.  MOUTH:  Tongue is well-papillated; no stomatitis or ulcers.  Lips normal.  THROAT:  Oropharynx without lesions or exudates.  NECK:  Supple with good range of motion; no thyromegaly or masses, no JVD.  LYMPHATICS:  No cervical, supraclavicular, axillary or inguinal adenopathy.  CHEST:  Lungs clear to auscultation. Good airflow.  CARDIAC:  Regular rate and rhythm without murmurs, rubs or gallops. Normal S1,S2.  ABDOMEN:  " Soft, nontender with no hepatosplenomegaly or masses.  EXTREMITIES:  No clubbing, cyanosis or edema.  NEUROLOGICAL:  Cranial Nerves II-XII grossly intact.  No focal neurological deficits.  PSYCHIATRIC:  Normal affect and mood.     RECENT LABS:        WBC   Date/Time Value Ref Range Status   08/10/2017 09:43 AM 9.04 4.00 - 10.00 10*3/mm3 Final     Hemoglobin   Date/Time Value Ref Range Status   08/10/2017 09:43 AM 14.4 13.5 - 16.5 g/dL Final     Platelets   Date/Time Value Ref Range Status   08/10/2017 09:43  150 - 375 10*3/mm3 Final       Assessment/Plan      Hereditary hemochromatosis: With his ferritin in the upper 40s today he has clearly responded to phlebotomy.    I will maintain an every other month schedule for the next 6 months with an M.D. review at that point we will have CBC and iron studies with each meal and at that point could perhaps transition to an every three-month schedule.

## 2017-10-04 DIAGNOSIS — E83.119 HEMOCHROMATOSIS, UNSPECIFIED HEMOCHROMATOSIS TYPE: Primary | ICD-10-CM

## 2017-10-05 ENCOUNTER — LAB (OUTPATIENT)
Dept: LAB | Facility: HOSPITAL | Age: 67
End: 2017-10-05

## 2017-10-05 ENCOUNTER — INFUSION (OUTPATIENT)
Dept: ONCOLOGY | Facility: HOSPITAL | Age: 67
End: 2017-10-05

## 2017-10-05 VITALS
HEART RATE: 60 BPM | SYSTOLIC BLOOD PRESSURE: 132 MMHG | DIASTOLIC BLOOD PRESSURE: 85 MMHG | TEMPERATURE: 98.2 F | WEIGHT: 221.6 LBS | BODY MASS INDEX: 34.71 KG/M2

## 2017-10-05 DIAGNOSIS — E83.110 HEREDITARY HEMOCHROMATOSIS (HCC): Primary | ICD-10-CM

## 2017-10-05 DIAGNOSIS — E83.119 HEMOCHROMATOSIS, UNSPECIFIED HEMOCHROMATOSIS TYPE: ICD-10-CM

## 2017-10-05 LAB
BASOPHILS # BLD AUTO: 0.1 10*3/MM3 (ref 0–0.1)
BASOPHILS NFR BLD AUTO: 1.1 % (ref 0–1.1)
DEPRECATED RDW RBC AUTO: 47.3 FL (ref 37–49)
EOSINOPHIL # BLD AUTO: 0.66 10*3/MM3 (ref 0–0.36)
EOSINOPHIL NFR BLD AUTO: 7.4 % (ref 1–5)
ERYTHROCYTE [DISTWIDTH] IN BLOOD BY AUTOMATED COUNT: 13.2 % (ref 11.7–14.5)
FERRITIN SERPL-MCNC: 138.1 NG/ML (ref 30–400)
HCT VFR BLD AUTO: 40.1 % (ref 40–49)
HGB BLD-MCNC: 13.3 G/DL (ref 13.5–16.5)
IMM GRANULOCYTES # BLD: 0.02 10*3/MM3 (ref 0–0.03)
IMM GRANULOCYTES NFR BLD: 0.2 % (ref 0–0.5)
IRON 24H UR-MRATE: 117 MCG/DL (ref 59–158)
IRON SATN MFR SERPL: 27 % (ref 14–48)
LYMPHOCYTES # BLD AUTO: 3.1 10*3/MM3 (ref 1–3.5)
LYMPHOCYTES NFR BLD AUTO: 34.8 % (ref 20–49)
MCH RBC QN AUTO: 32.2 PG (ref 27–33)
MCHC RBC AUTO-ENTMCNC: 33.2 G/DL (ref 32–35)
MCV RBC AUTO: 97.1 FL (ref 83–97)
MONOCYTES # BLD AUTO: 0.52 10*3/MM3 (ref 0.25–0.8)
MONOCYTES NFR BLD AUTO: 5.8 % (ref 4–12)
NEUTROPHILS # BLD AUTO: 4.52 10*3/MM3 (ref 1.5–7)
NEUTROPHILS NFR BLD AUTO: 50.7 % (ref 39–75)
NRBC BLD MANUAL-RTO: 0 /100 WBC (ref 0–0)
PLATELET # BLD AUTO: 254 10*3/MM3 (ref 150–375)
PMV BLD AUTO: 9.7 FL (ref 8.9–12.1)
RBC # BLD AUTO: 4.13 10*6/MM3 (ref 4.3–5.5)
TIBC SERPL-MCNC: 441 MCG/DL (ref 249–505)
TRANSFERRIN SERPL-MCNC: 315 MG/DL (ref 200–360)
WBC NRBC COR # BLD: 8.92 10*3/MM3 (ref 4–10)

## 2017-10-05 PROCEDURE — 85025 COMPLETE CBC W/AUTO DIFF WBC: CPT | Performed by: INTERNAL MEDICINE

## 2017-10-05 PROCEDURE — 83540 ASSAY OF IRON: CPT | Performed by: INTERNAL MEDICINE

## 2017-10-05 PROCEDURE — 99195 PHLEBOTOMY: CPT | Performed by: INTERNAL MEDICINE

## 2017-10-05 PROCEDURE — 84466 ASSAY OF TRANSFERRIN: CPT | Performed by: INTERNAL MEDICINE

## 2017-10-05 PROCEDURE — 36415 COLL VENOUS BLD VENIPUNCTURE: CPT | Performed by: INTERNAL MEDICINE

## 2017-10-05 PROCEDURE — 82728 ASSAY OF FERRITIN: CPT | Performed by: INTERNAL MEDICINE

## 2017-10-05 RX ORDER — SODIUM CHLORIDE 9 MG/ML
250 INJECTION, SOLUTION INTRAVENOUS ONCE
Status: CANCELLED | OUTPATIENT
Start: 2017-10-05

## 2017-11-01 ENCOUNTER — CLINICAL SUPPORT (OUTPATIENT)
Dept: ORTHOPEDIC SURGERY | Facility: CLINIC | Age: 67
End: 2017-11-01

## 2017-11-01 VITALS — BODY MASS INDEX: 34.69 KG/M2 | TEMPERATURE: 97.7 F | HEIGHT: 67 IN | WEIGHT: 221 LBS

## 2017-11-01 DIAGNOSIS — M17.12 ARTHRITIS OF LEFT KNEE: Primary | ICD-10-CM

## 2017-11-01 PROCEDURE — 20610 DRAIN/INJ JOINT/BURSA W/O US: CPT | Performed by: NURSE PRACTITIONER

## 2017-11-01 PROCEDURE — 99213 OFFICE O/P EST LOW 20 MIN: CPT | Performed by: NURSE PRACTITIONER

## 2017-11-01 RX ORDER — LIDOCAINE HYDROCHLORIDE 10 MG/ML
2 INJECTION, SOLUTION EPIDURAL; INFILTRATION; INTRACAUDAL; PERINEURAL
Status: COMPLETED | OUTPATIENT
Start: 2017-11-01 | End: 2017-11-01

## 2017-11-01 RX ORDER — ATORVASTATIN CALCIUM 40 MG/1
40 TABLET, FILM COATED ORAL DAILY
COMMUNITY
Start: 2017-09-26

## 2017-11-01 RX ORDER — BUPIVACAINE HYDROCHLORIDE 5 MG/ML
2 INJECTION, SOLUTION EPIDURAL; INTRACAUDAL
Status: COMPLETED | OUTPATIENT
Start: 2017-11-01 | End: 2017-11-01

## 2017-11-01 RX ORDER — METHYLPREDNISOLONE ACETATE 80 MG/ML
80 INJECTION, SUSPENSION INTRA-ARTICULAR; INTRALESIONAL; INTRAMUSCULAR; SOFT TISSUE
Status: COMPLETED | OUTPATIENT
Start: 2017-11-01 | End: 2017-11-01

## 2017-11-01 RX ADMIN — BUPIVACAINE HYDROCHLORIDE 2 ML: 5 INJECTION, SOLUTION EPIDURAL; INTRACAUDAL at 14:06

## 2017-11-01 RX ADMIN — METHYLPREDNISOLONE ACETATE 80 MG: 80 INJECTION, SUSPENSION INTRA-ARTICULAR; INTRALESIONAL; INTRAMUSCULAR; SOFT TISSUE at 14:06

## 2017-11-01 RX ADMIN — LIDOCAINE HYDROCHLORIDE 2 ML: 10 INJECTION, SOLUTION EPIDURAL; INFILTRATION; INTRACAUDAL; PERINEURAL at 14:06

## 2017-11-01 NOTE — PROGRESS NOTES
Patient: Loyd Avalos  YOB: 1950    Chief Complaints:  bilateral knee pain    Subjective:    History of Present Illness: Here today for bilat knee pain. The right knee is doing well at present but the left knee is aching daily.The pain is a generalized joint tenderness.  It has been progressive in nature but remains intermittent.  Worsened by prolonged standing or walking and squatting activities. Has had improvement in the past with ice/heat, rest, and injections.     This problem is not new to this examiner.     Allergies: No Known Allergies    Medications:   Home Medications:  Current Outpatient Prescriptions on File Prior to Visit   Medication Sig   • allopurinol (ZYLOPRIM) 300 MG tablet    • aspirin 81 MG EC tablet Take 81 mg by mouth daily.   • fenofibrate micronized (LOFIBRA) 134 MG capsule    • ibuprofen (ADVIL,MOTRIN) 800 MG tablet    • lisinopril (PRINIVIL,ZESTRIL) 10 MG tablet    • metFORMIN (GLUCOPHAGE) 500 MG tablet    • metoprolol succinate XL (TOPROL-XL) 25 MG 24 hr tablet      No current facility-administered medications on file prior to visit.      Current Medications:  Scheduled Meds:  Continuous Infusions:  No current facility-administered medications for this visit.   PRN Meds:.    I have reviewed the patient's medical history in detail and updated the computerized patient record.  Review and summarization of old records include:    Past Medical History:   Diagnosis Date   • Diabetes    • Heart disease     Status post pacemaker implantation   • Hemochromatosis    • Sleep apnea         Past Surgical History:   Procedure Laterality Date   • KNEE SURGERY Right    • PACEMAKER IMPLANTATION  05/2006        Social History     Occupational History   • Retired      Social History Main Topics   • Smoking status: Current Some Day Smoker     Types: Cigars   • Smokeless tobacco: Not on file   • Alcohol use Yes      Comment: Moderate   • Drug use: No   • Sexual activity: Defer     Social History     Social History Narrative        Family History   Problem Relation Age of Onset   • Cancer Other      LYMPH & BREAST   • Diabetes Other    • Hypertension Other    • Heart disease Other    • Breast cancer Mother    • Lymphoma Father    • Hemochromatosis Father        ROS: 14 point review of systems was performed and was negative except for documented findings in HPI and today's encounter.     Allergies: No Known Allergies  Constitutional:  Denies fever, shaking or chills   Eyes:  Denies change in visual acuity   HENT:  Denies nasal congestion or sore throat   Respiratory:  Denies cough or shortness of breath   Cardiovascular:  Denies chest pain or severe LE edema   GI:  Denies abdominal pain, nausea, vomiting, bloody stools or diarrhea   Musculoskeletal:  Numbness, tingling, or loss of motor function only as noted above in history of present illness.  : Denies painful urination or hematuria  Integument:  Denies rash, lesion or ulceration   Neurologic:  Denies headache or focal weakness  Endocrine:  Denies lymphadenopathy  Psych:  Denies confusion or change in mental status   Hem:  Denies active bleeding    Physical Exam:  Body mass index is 34.61 kg/(m^2).  Vitals:    11/01/17 1405   Temp: 97.7 °F (36.5 °C)     Vital Signs:  reviewed  Constitutional: Awake alert and oriented x3, well developed, no acute distress, non-toxic appearance.  EYES: symmetric, sclera clear  ENT:  Normocephalic, Atraumatic.   Respiratory:  No respiratory distress, No wheezing  CV: pulse regular, no palpitations or pallor.  GI:  Abdomen soft, non-tender.   Vascular:  Intact distal pulses, No cyanosis, no signs or symptoms of DVT.  Neurologic: Sensation grossly intact to the involved extremity, No focal deficits noted.   Neck: No tenderness, Supple.  Integument: warm, dry, no ulcerations.   Psychiatric:  Oriented, no pathological affect.  Musculoskeletal:    Affected knee(s):  Painful gait with a subtle limp, positive for  synovitis, swelling, joint effusion with crepitation.  Lachman negative  Posterior drawer negative  Tereza's negative  Patellofemoral grind +  Sensation grossly intact to light touch throughout the lower extremity  Skin is intact  Distal pulses are palpable  No signs or symptoms of DVT        Diagnostic Data:     Imaging was done previously in the office, images were personally viewed and discussed at length with the patient:    Indication: pain related symptoms,  Views: 3V AP, LAT & 40 degree PA bilateral knee(s)   Findings: severe end-stage arthritis (bone on bone, subchondral sclerosis/cysts, osteophytes)  Comparison views: viewed last xray done in the office.     Procedure:  Large Joint Arthrocentesis  Date/Time: 11/1/2017 2:06 PM  Consent given by: patient  Site marked: site marked  Timeout: Immediately prior to procedure a time out was called to verify the correct patient, procedure, equipment, support staff and site/side marked as required   Supporting Documentation  Indications: pain and joint swelling   Procedure Details  Location: knee - L knee  Preparation: Patient was prepped and draped in the usual sterile fashion  Needle size: 22 G  Approach: anterolateral  Medications administered: 2 mL lidocaine PF 1% 1 %; 2 mL bupivacaine (PF) 0.5 %; 80 mg methylPREDNISolone acetate 80 MG/ML  Patient tolerance: patient tolerated the procedure well with no immediate complications          Assessment. Severe arthritis bilateral knee(s).      Plan: Is to proceed with injection  Follow up as indicated.  Ice, elevate, and rest as needed.  Additional interventions include:  15 min spent face to face with patient 9 min spent counseling about natural history and expected course of assessed complaint and reviewed treatment options that have been tried and not tried and those currently available. Questions answered.    Natural history and expected course of this patient's diagnosis discussed along with evaluation of  therapies. Questions answered.  Advice on benefits of, and types of regular/moderate exercise including biomechanical forces involved as it pertains to this complaint, with a goal of 5 min at least 7 times a week.    Address and update of wt loss, physical exercises, use of assistive devices, and monitoring of Medications per orders to address ortho complaints; Evaluation and discussion of safety, precautions, side effects, and warnings given especially of long term NSAID therapy.  Lifestyle measures for weight loss, suggest starting at 0lbs, with biomechanical explanations for weight loss and how this affects orthopedic condition.  Cortisone Injection. See procedure note.  OTC analgesics as needed with dosage warning and instructions given.  Cryotherapy/brachy therapy as indicated with instructions.   Advised on knee strengthening exercises, stressed importance of these with progression of arthritis, demonstrated exercises to patient with repeat demonstration and verb of understanding.   Has occasional muscle cramps in his calves and in his hip and in his arm/hand.  Advised to talk to Dr. Lopez about possible sources of the muscle cramps including fenofibric acid.  Verb understanding of this.   11/1/2017  JAX

## 2017-11-06 ENCOUNTER — APPOINTMENT (OUTPATIENT)
Dept: CT IMAGING | Facility: HOSPITAL | Age: 67
End: 2017-11-06

## 2017-11-06 ENCOUNTER — HOSPITAL ENCOUNTER (EMERGENCY)
Facility: HOSPITAL | Age: 67
Discharge: HOME | End: 2017-11-07
Attending: EMERGENCY MEDICINE | Admitting: EMERGENCY MEDICINE

## 2017-11-06 DIAGNOSIS — S01.01XA LACERATION OF SCALP, INITIAL ENCOUNTER: ICD-10-CM
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
## 2017-11-06 DIAGNOSIS — S09.90XA INJURY OF HEAD, INITIAL ENCOUNTER: ICD-10-CM

## 2017-11-06 DIAGNOSIS — S16.1XXA STRAIN OF NECK MUSCLE, INITIAL ENCOUNTER: ICD-10-CM

## 2017-11-06 PROCEDURE — 99284 EMERGENCY DEPT VISIT MOD MDM: CPT

## 2017-11-06 PROCEDURE — 70450 CT HEAD/BRAIN W/O DYE: CPT

## 2017-11-06 PROCEDURE — 72125 CT NECK SPINE W/O DYE: CPT

## 2017-11-07 VITALS
HEIGHT: 67 IN | WEIGHT: 221 LBS | RESPIRATION RATE: 18 BRPM | BODY MASS INDEX: 34.69 KG/M2 | TEMPERATURE: 98.3 F | HEART RATE: 67 BPM | OXYGEN SATURATION: 94 % | SYSTOLIC BLOOD PRESSURE: 106 MMHG | DIASTOLIC BLOOD PRESSURE: 75 MMHG

## 2017-11-07 RX ORDER — LIDOCAINE HYDROCHLORIDE 10 MG/ML
20 INJECTION, SOLUTION EPIDURAL; INFILTRATION; INTRACAUDAL; PERINEURAL ONCE
Status: COMPLETED | OUTPATIENT
Start: 2017-11-07 | End: 2017-11-07

## 2017-11-07 RX ORDER — LIDOCAINE HYDROCHLORIDE 10 MG/ML
INJECTION, SOLUTION INFILTRATION; PERINEURAL
Status: COMPLETED
Start: 2017-11-07 | End: 2017-11-07

## 2017-11-07 RX ADMIN — LIDOCAINE HYDROCHLORIDE 20 ML: 10 INJECTION, SOLUTION EPIDURAL; INFILTRATION; INTRACAUDAL; PERINEURAL at 00:43

## 2017-11-07 RX ADMIN — LIDOCAINE HYDROCHLORIDE 20 ML: 10 INJECTION, SOLUTION INFILTRATION; PERINEURAL at 00:43

## 2017-11-07 NOTE — DISCHARGE INSTRUCTIONS
Return to the ER with any further concerns or should your condition change or worsen.  Keep laceration clean and dry, apply antibiotic ointment once or twice daily, watch carefully for signs of infection, staples need to be removed in 10 days. Return to care if any complications.

## 2017-11-07 NOTE — ED PROVIDER NOTES
Pt presents to the ED after falling off of a ladder while working in his garage. Pt states that he landed on his back, and stuck his head. He complains of a head pain, neck pain, and back pain. He denies LOC, but does admit to EtOH tonight. On exam, Pt is resting comfortably, in no distress, and without focal neurologic deficit. 1 cm scalp lac, mild mild line cervical tenderness w/o deformity. Abd is soft and non tender. Imaging studies show NAD.     Attestation:  I supervised care provided by the midlevel provider.    We have discussed this patient's history, physical exam, and treatment plan.   I have reviewed the note and personally saw and examined the patient and agree with the plan of care.    Documentation assistance provided by cecile Dykes for Dr Will Information recorded by the cecile was done at my direction and has been verified and validated by me.     Noemy Dykes  11/07/17 0056       Mal Will MD  11/07/17 0644

## 2017-11-07 NOTE — ED PROVIDER NOTES
EMERGENCY DEPARTMENT ENCOUNTER    CHIEF COMPLAINT  Chief Complaint: Fall  History given by: Patient  History limited by: Nothing  Room Number: 11/11  PMD: Ari Lopez Jr., MD      HPI:  Pt is a 67 y.o. male who presents to the ED via EMS with a c-collar in place s/p fall onto a concrete surface from approximately five feet while he was on a ladder around 2100 tonight and he's currently complaining of superior head . The patient reportedly landed directly on his back and he sustained a posterior scalp contusion upon impact without LOC. Patient explains that he has had been drinking before climbing the ladder which likely attributed to his fall. He currently denies any numbness, tingling, HA, nausea, vomiting, diarrhea, chest wall pain, abdominal pain or back pain since the fall occurred. No other complaints at this time. Patient is unsure of the status of his tetanus.       Duration:  Seconds  Onset: Sudden  Timing: Constant  Location: Posterior Head  Radiation: None  Quality: Dull  Intensity/Severity: Moderate  Progression: No Change  Associated Symptoms: Head pain   Aggravating Factors: Palpation  Alleviating Factors: Rest  Previous Episodes: None  Treatment before arrival: None    PAST MEDICAL HISTORY  Active Ambulatory Problems     Diagnosis Date Noted   • Primary osteoarthritis of left knee 03/31/2016   • Hemochromatosis 05/12/2016   • Arthritis of left knee 08/05/2016   • Chronic pain of left knee 08/03/2017     Resolved Ambulatory Problems     Diagnosis Date Noted   • No Resolved Ambulatory Problems     Past Medical History:   Diagnosis Date   • Diabetes    • Heart disease    • Hemochromatosis    • Sleep apnea        PAST SURGICAL HISTORY  Past Surgical History:   Procedure Laterality Date   • KNEE SURGERY Right    • PACEMAKER IMPLANTATION  05/2006       FAMILY HISTORY  Family History   Problem Relation Age of Onset   • Cancer Other      LYMPH & BREAST   • Diabetes Other    • Hypertension Other    • Heart  "disease Other    • Breast cancer Mother    • Lymphoma Father    • Hemochromatosis Father        SOCIAL HISTORY  Social History     Social History   • Marital status:      Spouse name: N/A   • Number of children: N/A   • Years of education: High School     Occupational History   • Retired      Social History Main Topics   • Smoking status: Current Some Day Smoker     Types: Cigars   • Smokeless tobacco: Not on file   • Alcohol use Yes      Comment: Moderate \"few beers daily\"   • Drug use: No   • Sexual activity: Defer     Other Topics Concern   • Not on file     Social History Narrative   • No narrative on file       ALLERGIES  Review of patient's allergies indicates no known allergies.    REVIEW OF SYSTEMS  Review of Systems   Constitutional: Negative.  Negative for chills and fever.   HENT: Negative.  Negative for sore throat.    Eyes: Negative.    Respiratory: Negative.  Negative for cough.    Cardiovascular: Negative.  Negative for chest pain.   Gastrointestinal: Negative.    Genitourinary: Negative.  Negative for dysuria.   Musculoskeletal: Negative.  Negative for back pain.        Posterior head pain    Skin: Negative.  Negative for rash.   Neurological: Negative.  Negative for headaches.       PHYSICAL EXAM  ED Triage Vitals   Temp Heart Rate Resp BP SpO2   11/06/17 2346 11/06/17 2324 11/06/17 2324 11/06/17 2324 11/06/17 2324   97.9 °F (36.6 °C) 60 18 153/71 96 %      Temp src Heart Rate Source Patient Position BP Location FiO2 (%)   11/06/17 2346 11/06/17 2324 11/06/17 2324 -- --   Tympanic Monitor Lying         Physical Exam   Constitutional: He is oriented to person, place, and time. No distress.   HENT:   Head: Normocephalic and atraumatic.   3.5 cm laceration to the right parietal scalp.    Eyes: EOM are normal. Pupils are equal, round, and reactive to light.   Neck: Normal range of motion.   Cardiovascular: Normal rate, regular rhythm and normal heart sounds.    Pulmonary/Chest: Effort normal and " breath sounds normal. No respiratory distress.   Abdominal: Soft. There is no tenderness.   Musculoskeletal: Normal range of motion. He exhibits no edema.        Cervical back: He exhibits no tenderness.        Thoracic back: He exhibits no tenderness.        Lumbar back: He exhibits no tenderness.   Upper and lower extremities are unremarkable.   No step off deformity.      Neurological: He is alert and oriented to person, place, and time.   Skin: Skin is warm and dry.   Nursing note and vitals reviewed.      RADIOLOGY  CT Head Without Contrast   Preliminary Result   No definite acute intracranial pathology.        ----------------------------------------------------------------       CT CERVICAL SPINE WITHOUT CONTRAST.       TECHNIQUE: Radiation dose reduction techniques were utilized, including   automated exposure control and exposure modulation based on body size.   Routine axial images of the cervical spine with coronal and sagittal   reconstructed images.       HISTORY: Ethanol, fall, neck pain.       COMPARISON:  No prior studies for comparison.       FINDINGS:    Vertebral body height is normal, no acute fracture is seen. Multilevel   loss of intervertebral disc height and spurring. Mild facet joint   disease.       Prevertebral soft tissue is unremarkable.            IMPRESSION:    No acute fracture of the cervical spine.                              CT Cervical Spine Without Contrast   Preliminary Result   No definite acute intracranial pathology.        ----------------------------------------------------------------       CT CERVICAL SPINE WITHOUT CONTRAST.       TECHNIQUE: Radiation dose reduction techniques were utilized, including   automated exposure control and exposure modulation based on body size.   Routine axial images of the cervical spine with coronal and sagittal   reconstructed images.       HISTORY: Ethanol, fall, neck pain.       COMPARISON:  No prior studies for comparison.        FINDINGS:    Vertebral body height is normal, no acute fracture is seen. Multilevel   loss of intervertebral disc height and spurring. Mild facet joint   disease.       Prevertebral soft tissue is unremarkable.            IMPRESSION:    No acute fracture of the cervical spine.                                 I ordered the above noted radiological studies. Interpreted by radiologist. Discussed with radiologist. Reviewed by me in PACS.       PROCEDURES  Laceration Repair  Date/Time: 11/7/2017 12:46 AM  Performed by: ETHAN DOAN III  Authorized by: JASVIR MORRELL   Consent: Verbal consent obtained.  Consent given by: patient  Patient understanding: patient states understanding of the procedure being performed  Patient consent: the patient's understanding of the procedure matches consent given  Patient identity confirmed: verbally with patient  Body area: head/neck  Location details: scalp  Laceration length: 3.5 cm  Foreign bodies: no foreign bodies  Tendon involvement: none  Nerve involvement: none  Vascular damage: no  Anesthesia: local infiltration    Anesthesia:  Local Anesthetic: lidocaine 1% with epinephrine  Anesthetic total: 3 mL    Sedation:  Patient sedated: no  Preparation: Patient was prepped and draped in the usual sterile fashion.  Irrigation solution: saline  Irrigation method: syringe  Amount of cleaning: standard  Debridement: none  Degree of undermining: none  Skin closure: staples  Number of sutures: 6  Approximation difficulty: simple  Patient tolerance: Patient tolerated the procedure well with no immediate complications          PROGRESS AND CONSULTS  ED Course     2333  Ordered CT head and C spine for further evaluation.     0037  Discussed case with  and he agrees with the treatment plan.     0038  Rechecked the patient and he is resting. I re-examined the patient's scalp and there is a 1 cm laceration at right parietal scalp. Discussed pertinent lab and imaging  results, including CT head and C spine are negative acute although I discussed the plan to staple the laceration closed prior to discharge. Patient agrees with plan and all questions were addressed.     1250  Rechecked the patient and he is resting. Discussed plan to discharge the patient home and recommended follow up with his PCP as directed to remove the staples in 7-10 days. Patient agrees with the plan and all questions were addressed.     Latest vital signs   BP- 153/71 HR- 60 Temp- 97.9 °F (36.6 °C) (Tympanic) O2 sat- 94%      MEDICAL DECISION MAKING  Results were reviewed/discussed with the patient and they were also made aware of online access. Pt also made aware that some labs, such as cultures, will not be resulted during ER visit and follow up with PMD is necessary.     MDM  Number of Diagnoses or Management Options     Amount and/or Complexity of Data Reviewed  Tests in the radiology section of CPT®: ordered and reviewed (CT head and C spine - negative acute)  Discussion of test results with the performing providers: yes    Patient Progress  Patient progress: stable         DIAGNOSIS  Final diagnoses:   Fall, initial encounter   Injury of head, initial encounter   Laceration of scalp, initial encounter   Strain of neck muscle, initial encounter       DISPOSITION  DISCHARGE    Patient discharged in stable condition.    Reviewed implications of results, diagnosis, meds, responsibility to follow up, warning signs and symptoms of possible worsening, potential complications and reasons to return to ER, including any further falls or development of nausea or vomiting.     Patient/Family voiced understanding of above instructions.    Discussed plan for discharge, as there is no emergent indication for admission.  Pt/family is agreeable and understands need for follow up and repeat testing.  Pt is aware that discharge does not mean that nothing is wrong but it indicates no emergency is present that requires  admission and they must continue care with follow-up as given below or physician of their choice.     FOLLOW-UP  Ari Lopez Jr., MD  [address]  [facility] [id number] [phone]    In 10 days  For further evaluation and treatment and staple removal         Medication List      Notice     No changes were made to your prescriptions during this visit.          Latest Documented Vital Signs:  As of 1:34 AM  BP- 153/71 HR- 60 Temp- 97.9 °F (36.6 °C) (Tympanic) O2 sat- 94%    --  Documentation assistance provided by cecile Coto for Bry Hebert PA-C.  Information recorded by the scribe was done at my direction and has been verified and validated by me.        Rodríguez Coto  11/07/17 0139       BHAVESH Bañuelos III  11/07/17 0533

## 2017-11-07 NOTE — ED NOTES
"Pt to ER via EMS from home. He fell approx. 5 feet from a ladder around 2200 while working on his garage door. Pt landed on his back- Contusion to posterior scalp. Pt fell onto concrete garage floor. +Etoh- Pt states \"I've had a few\". Backboard and c-collar in place. No loss of consciousness.      Lo Hummel RN  11/06/17 [id number]    "

## 2017-11-29 ENCOUNTER — OFFICE (AMBULATORY)
Dept: URBAN - METROPOLITAN AREA CLINIC 64 | Facility: CLINIC | Age: 67
End: 2017-11-29

## 2017-11-29 ENCOUNTER — AMBULATORY SURGICAL CENTER (AMBULATORY)
Dept: URBAN - METROPOLITAN AREA SURGERY 17 | Facility: SURGERY | Age: 67
End: 2017-11-29

## 2017-11-29 VITALS
OXYGEN SATURATION: 94 % | DIASTOLIC BLOOD PRESSURE: 59 MMHG | DIASTOLIC BLOOD PRESSURE: 58 MMHG | RESPIRATION RATE: 23 BRPM | SYSTOLIC BLOOD PRESSURE: 111 MMHG | RESPIRATION RATE: 21 BRPM | HEART RATE: 64 BPM | SYSTOLIC BLOOD PRESSURE: 90 MMHG | OXYGEN SATURATION: 96 % | OXYGEN SATURATION: 100 % | SYSTOLIC BLOOD PRESSURE: 114 MMHG | SYSTOLIC BLOOD PRESSURE: 123 MMHG | HEART RATE: 61 BPM | OXYGEN SATURATION: 99 % | OXYGEN SATURATION: 95 % | SYSTOLIC BLOOD PRESSURE: 109 MMHG | TEMPERATURE: 98.1 F | HEART RATE: 96 BPM | DIASTOLIC BLOOD PRESSURE: 93 MMHG | DIASTOLIC BLOOD PRESSURE: 74 MMHG | DIASTOLIC BLOOD PRESSURE: 54 MMHG | RESPIRATION RATE: 16 BRPM | SYSTOLIC BLOOD PRESSURE: 165 MMHG | OXYGEN SATURATION: 98 % | RESPIRATION RATE: 20 BRPM | DIASTOLIC BLOOD PRESSURE: 73 MMHG | DIASTOLIC BLOOD PRESSURE: 94 MMHG | TEMPERATURE: 98.7 F | SYSTOLIC BLOOD PRESSURE: 144 MMHG | SYSTOLIC BLOOD PRESSURE: 148 MMHG | SYSTOLIC BLOOD PRESSURE: 97 MMHG | HEART RATE: 63 BPM | DIASTOLIC BLOOD PRESSURE: 71 MMHG | DIASTOLIC BLOOD PRESSURE: 63 MMHG | SYSTOLIC BLOOD PRESSURE: 96 MMHG | HEART RATE: 76 BPM | HEART RATE: 75 BPM | DIASTOLIC BLOOD PRESSURE: 99 MMHG | SYSTOLIC BLOOD PRESSURE: 94 MMHG | SYSTOLIC BLOOD PRESSURE: 175 MMHG | DIASTOLIC BLOOD PRESSURE: 91 MMHG | WEIGHT: 220 LBS | RESPIRATION RATE: 22 BRPM | DIASTOLIC BLOOD PRESSURE: 62 MMHG | HEART RATE: 70 BPM | HEART RATE: 72 BPM | RESPIRATION RATE: 14 BRPM | DIASTOLIC BLOOD PRESSURE: 69 MMHG | DIASTOLIC BLOOD PRESSURE: 57 MMHG | RESPIRATION RATE: 29 BRPM | HEART RATE: 60 BPM | OXYGEN SATURATION: 87 % | RESPIRATION RATE: 15 BRPM | DIASTOLIC BLOOD PRESSURE: 53 MMHG | RESPIRATION RATE: 19 BRPM | HEIGHT: 67 IN | RESPIRATION RATE: 27 BRPM | OXYGEN SATURATION: 97 % | HEART RATE: 62 BPM | DIASTOLIC BLOOD PRESSURE: 88 MMHG | RESPIRATION RATE: 18 BRPM | SYSTOLIC BLOOD PRESSURE: 160 MMHG | RESPIRATION RATE: 28 BRPM | SYSTOLIC BLOOD PRESSURE: 113 MMHG | SYSTOLIC BLOOD PRESSURE: 119 MMHG

## 2017-11-29 DIAGNOSIS — K63.5 POLYP OF COLON: ICD-10-CM

## 2017-11-29 DIAGNOSIS — K62.1 RECTAL POLYP: ICD-10-CM

## 2017-11-29 DIAGNOSIS — D12.3 BENIGN NEOPLASM OF TRANSVERSE COLON: ICD-10-CM

## 2017-11-29 DIAGNOSIS — K64.8 OTHER HEMORRHOIDS: ICD-10-CM

## 2017-11-29 DIAGNOSIS — Z12.11 ENCOUNTER FOR SCREENING FOR MALIGNANT NEOPLASM OF COLON: ICD-10-CM

## 2017-11-29 DIAGNOSIS — K57.30 DIVERTICULOSIS OF LARGE INTESTINE WITHOUT PERFORATION OR ABS: ICD-10-CM

## 2017-11-29 PROBLEM — Z86.010 PERSONAL HISTORY OF COLONIC POLYPS: Status: ACTIVE | Noted: 2017-11-29

## 2017-11-29 PROBLEM — D12.5 BENIGN NEOPLASM OF SIGMOID COLON: Status: ACTIVE | Noted: 2017-11-29

## 2017-11-29 LAB
GI HISTOLOGY: A. UNSPECIFIED: (no result)
GI HISTOLOGY: B. UNSPECIFIED: (no result)
GI HISTOLOGY: C. SELECT: (no result)
GI HISTOLOGY: D. UNSPECIFIED: (no result)
GI HISTOLOGY: PDF REPORT: (no result)

## 2017-11-29 PROCEDURE — 45380 COLONOSCOPY AND BIOPSY: CPT | Mod: PT,59 | Performed by: INTERNAL MEDICINE

## 2017-11-29 PROCEDURE — 88305 TISSUE EXAM BY PATHOLOGIST: CPT | Performed by: INTERNAL MEDICINE

## 2017-11-29 PROCEDURE — 45380 COLONOSCOPY AND BIOPSY: CPT | Mod: 59,PT | Performed by: INTERNAL MEDICINE

## 2017-11-29 PROCEDURE — 45385 COLONOSCOPY W/LESION REMOVAL: CPT | Mod: PT | Performed by: INTERNAL MEDICINE

## 2017-11-29 RX ADMIN — PROPOFOL 50 MG: 10 INJECTION, EMULSION INTRAVENOUS at 10:08

## 2017-11-29 RX ADMIN — PROPOFOL 25 MG: 10 INJECTION, EMULSION INTRAVENOUS at 10:26

## 2017-11-29 RX ADMIN — LIDOCAINE HYDROCHLORIDE 50 MG: 10 INJECTION, SOLUTION EPIDURAL; INFILTRATION; INTRACAUDAL; PERINEURAL at 09:57

## 2017-11-29 RX ADMIN — PROPOFOL 25 MG: 10 INJECTION, EMULSION INTRAVENOUS at 10:21

## 2017-11-29 RX ADMIN — PROPOFOL 100 MG: 10 INJECTION, EMULSION INTRAVENOUS at 09:57

## 2017-11-29 RX ADMIN — PROPOFOL 50 MG: 10 INJECTION, EMULSION INTRAVENOUS at 10:13

## 2017-11-29 RX ADMIN — PROPOFOL 25 MG: 10 INJECTION, EMULSION INTRAVENOUS at 10:17

## 2017-11-29 RX ADMIN — PROPOFOL 50 MG: 10 INJECTION, EMULSION INTRAVENOUS at 10:01

## 2017-11-29 RX ADMIN — PROPOFOL 50 MG: 10 INJECTION, EMULSION INTRAVENOUS at 10:00

## 2017-11-29 RX ADMIN — PROPOFOL 25 MG: 10 INJECTION, EMULSION INTRAVENOUS at 10:29

## 2017-11-29 RX ADMIN — SODIUM CHLORIDE 1000 ML: 900 IRRIGANT IRRIGATION at 09:59

## 2017-12-14 ENCOUNTER — LAB (OUTPATIENT)
Dept: LAB | Facility: HOSPITAL | Age: 67
End: 2017-12-14

## 2017-12-14 ENCOUNTER — INFUSION (OUTPATIENT)
Dept: ONCOLOGY | Facility: HOSPITAL | Age: 67
End: 2017-12-14

## 2017-12-14 VITALS
TEMPERATURE: 98.2 F | SYSTOLIC BLOOD PRESSURE: 152 MMHG | HEART RATE: 89 BPM | WEIGHT: 224.4 LBS | DIASTOLIC BLOOD PRESSURE: 89 MMHG | BODY MASS INDEX: 35.15 KG/M2

## 2017-12-14 DIAGNOSIS — E83.119 HEMOCHROMATOSIS, UNSPECIFIED HEMOCHROMATOSIS TYPE: ICD-10-CM

## 2017-12-14 LAB
BASOPHILS # BLD AUTO: 0.12 10*3/MM3 (ref 0–0.1)
BASOPHILS NFR BLD AUTO: 1 % (ref 0–1.1)
DEPRECATED RDW RBC AUTO: 47 FL (ref 37–49)
EOSINOPHIL # BLD AUTO: 0.79 10*3/MM3 (ref 0–0.36)
EOSINOPHIL NFR BLD AUTO: 6.6 % (ref 1–5)
ERYTHROCYTE [DISTWIDTH] IN BLOOD BY AUTOMATED COUNT: 12.9 % (ref 11.7–14.5)
FERRITIN SERPL-MCNC: 37.9 NG/ML (ref 30–400)
HCT VFR BLD AUTO: 41.6 % (ref 40–49)
HGB BLD-MCNC: 13.9 G/DL (ref 13.5–16.5)
IMM GRANULOCYTES # BLD: 0.03 10*3/MM3 (ref 0–0.03)
IMM GRANULOCYTES NFR BLD: 0.3 % (ref 0–0.5)
IRON 24H UR-MRATE: 65 MCG/DL (ref 59–158)
IRON SATN MFR SERPL: 17 % (ref 14–48)
LYMPHOCYTES # BLD AUTO: 4.16 10*3/MM3 (ref 1–3.5)
LYMPHOCYTES NFR BLD AUTO: 34.7 % (ref 20–49)
MCH RBC QN AUTO: 33.1 PG (ref 27–33)
MCHC RBC AUTO-ENTMCNC: 33.4 G/DL (ref 32–35)
MCV RBC AUTO: 99 FL (ref 83–97)
MONOCYTES # BLD AUTO: 0.99 10*3/MM3 (ref 0.25–0.8)
MONOCYTES NFR BLD AUTO: 8.3 % (ref 4–12)
NEUTROPHILS # BLD AUTO: 5.9 10*3/MM3 (ref 1.5–7)
NEUTROPHILS NFR BLD AUTO: 49.1 % (ref 39–75)
NRBC BLD MANUAL-RTO: 0 /100 WBC (ref 0–0)
PLATELET # BLD AUTO: 258 10*3/MM3 (ref 150–375)
PMV BLD AUTO: 9.5 FL (ref 8.9–12.1)
RBC # BLD AUTO: 4.2 10*6/MM3 (ref 4.3–5.5)
TIBC SERPL-MCNC: 385 MCG/DL (ref 249–505)
TRANSFERRIN SERPL-MCNC: 275 MG/DL (ref 200–360)
WBC NRBC COR # BLD: 11.99 10*3/MM3 (ref 4–10)

## 2017-12-14 PROCEDURE — 84466 ASSAY OF TRANSFERRIN: CPT | Performed by: INTERNAL MEDICINE

## 2017-12-14 PROCEDURE — 82728 ASSAY OF FERRITIN: CPT | Performed by: INTERNAL MEDICINE

## 2017-12-14 PROCEDURE — 36415 COLL VENOUS BLD VENIPUNCTURE: CPT | Performed by: INTERNAL MEDICINE

## 2017-12-14 PROCEDURE — 83540 ASSAY OF IRON: CPT | Performed by: INTERNAL MEDICINE

## 2017-12-14 PROCEDURE — 85025 COMPLETE CBC W/AUTO DIFF WBC: CPT | Performed by: INTERNAL MEDICINE

## 2017-12-14 NOTE — PROGRESS NOTES
Ferritin 37.9.  No plebo needed.  Confirmed next appt and pt v/u.  Pt refused copy of today's labs.  No complaints at this time.

## 2018-01-25 ENCOUNTER — OFFICE VISIT (OUTPATIENT)
Dept: ONCOLOGY | Facility: CLINIC | Age: 68
End: 2018-01-25

## 2018-01-25 ENCOUNTER — APPOINTMENT (OUTPATIENT)
Dept: ONCOLOGY | Facility: HOSPITAL | Age: 68
End: 2018-01-25

## 2018-01-25 ENCOUNTER — LAB (OUTPATIENT)
Dept: LAB | Facility: HOSPITAL | Age: 68
End: 2018-01-25

## 2018-01-25 VITALS
BODY MASS INDEX: 35 KG/M2 | RESPIRATION RATE: 18 BRPM | WEIGHT: 223 LBS | SYSTOLIC BLOOD PRESSURE: 152 MMHG | HEIGHT: 67 IN | OXYGEN SATURATION: 97 % | TEMPERATURE: 98.1 F | DIASTOLIC BLOOD PRESSURE: 86 MMHG | HEART RATE: 82 BPM

## 2018-01-25 DIAGNOSIS — E83.119 HEMOCHROMATOSIS, UNSPECIFIED HEMOCHROMATOSIS TYPE: Primary | ICD-10-CM

## 2018-01-25 DIAGNOSIS — E83.119 HEMOCHROMATOSIS, UNSPECIFIED HEMOCHROMATOSIS TYPE: ICD-10-CM

## 2018-01-25 LAB
BASOPHILS # BLD AUTO: 0.1 10*3/MM3 (ref 0–0.1)
BASOPHILS NFR BLD AUTO: 1 % (ref 0–1.1)
DEPRECATED RDW RBC AUTO: 44.5 FL (ref 37–49)
EOSINOPHIL # BLD AUTO: 0.54 10*3/MM3 (ref 0–0.36)
EOSINOPHIL NFR BLD AUTO: 5.2 % (ref 1–5)
ERYTHROCYTE [DISTWIDTH] IN BLOOD BY AUTOMATED COUNT: 12.7 % (ref 11.7–14.5)
FERRITIN SERPL-MCNC: 29.8 NG/ML (ref 30–400)
HCT VFR BLD AUTO: 42.4 % (ref 40–49)
HGB BLD-MCNC: 14.5 G/DL (ref 13.5–16.5)
IMM GRANULOCYTES # BLD: 0.02 10*3/MM3 (ref 0–0.03)
IMM GRANULOCYTES NFR BLD: 0.2 % (ref 0–0.5)
IRON 24H UR-MRATE: 107 MCG/DL (ref 59–158)
IRON SATN MFR SERPL: 26 % (ref 14–48)
LYMPHOCYTES # BLD AUTO: 4.24 10*3/MM3 (ref 1–3.5)
LYMPHOCYTES NFR BLD AUTO: 40.7 % (ref 20–49)
MCH RBC QN AUTO: 33 PG (ref 27–33)
MCHC RBC AUTO-ENTMCNC: 34.2 G/DL (ref 32–35)
MCV RBC AUTO: 96.4 FL (ref 83–97)
MONOCYTES # BLD AUTO: 0.76 10*3/MM3 (ref 0.25–0.8)
MONOCYTES NFR BLD AUTO: 7.3 % (ref 4–12)
NEUTROPHILS # BLD AUTO: 4.76 10*3/MM3 (ref 1.5–7)
NEUTROPHILS NFR BLD AUTO: 45.6 % (ref 39–75)
NRBC BLD MANUAL-RTO: 0 /100 WBC (ref 0–0)
PLATELET # BLD AUTO: 208 10*3/MM3 (ref 150–375)
PMV BLD AUTO: 9.5 FL (ref 8.9–12.1)
RBC # BLD AUTO: 4.4 10*6/MM3 (ref 4.3–5.5)
TIBC SERPL-MCNC: 417 MCG/DL (ref 249–505)
TRANSFERRIN SERPL-MCNC: 298 MG/DL (ref 200–360)
WBC NRBC COR # BLD: 10.42 10*3/MM3 (ref 4–10)

## 2018-01-25 PROCEDURE — 82728 ASSAY OF FERRITIN: CPT | Performed by: INTERNAL MEDICINE

## 2018-01-25 PROCEDURE — 83540 ASSAY OF IRON: CPT | Performed by: INTERNAL MEDICINE

## 2018-01-25 PROCEDURE — 84466 ASSAY OF TRANSFERRIN: CPT | Performed by: INTERNAL MEDICINE

## 2018-01-25 PROCEDURE — 36415 COLL VENOUS BLD VENIPUNCTURE: CPT | Performed by: INTERNAL MEDICINE

## 2018-01-25 PROCEDURE — 99214 OFFICE O/P EST MOD 30 MIN: CPT | Performed by: INTERNAL MEDICINE

## 2018-01-25 PROCEDURE — 85025 COMPLETE CBC W/AUTO DIFF WBC: CPT | Performed by: INTERNAL MEDICINE

## 2018-01-25 NOTE — PROGRESS NOTES
"REASONS FOR FOLLOWUP:    hemochromatosis    HISTORY OF PRESENT ILLNESS:  The patient is a 67 y.o. year old male  who is here for follow-up with the above-mentioned history.  He has undergone phlebotomy of 500 cc every other month in an effort to reduce his ferritin I have requested a more stringent regimen briefly, every other month x 3 then every 3 month approach to phlebo's.    Past Medical History:   Diagnosis Date   • Diabetes    • Heart disease     Status post pacemaker implantation   • Hemochromatosis    • Sleep apnea      Past Surgical History:   Procedure Laterality Date   • KNEE SURGERY Right    • PACEMAKER IMPLANTATION  05/2006       HEMATOLOGIC/ONCOLOGIC HISTORY:  (History from previous dates can be found in the separate document.)    MEDICATIONS    Current Outpatient Prescriptions:   •  allopurinol (ZYLOPRIM) 300 MG tablet, , Disp: , Rfl:   •  aspirin 81 MG EC tablet, Take 81 mg by mouth daily., Disp: , Rfl:   •  atorvastatin (LIPITOR) 40 MG tablet, , Disp: , Rfl:   •  ibuprofen (ADVIL,MOTRIN) 800 MG tablet, , Disp: , Rfl:   •  lisinopril (PRINIVIL,ZESTRIL) 10 MG tablet, , Disp: , Rfl:   •  metFORMIN (GLUCOPHAGE) 500 MG tablet, , Disp: , Rfl:   •  metoprolol succinate XL (TOPROL-XL) 25 MG 24 hr tablet, , Disp: , Rfl:   •  Multiple Vitamins-Minerals (MULTIVITAMIN ADULT PO), Take  by mouth Daily., Disp: , Rfl:     ALLERGIES:   No Known Allergies    SOCIAL HISTORY:       Social History     Social History   • Marital status:      Spouse name: N/A   • Number of children: N/A   • Years of education: High School     Occupational History   • Retired      Social History Main Topics   • Smoking status: Former Smoker     Types: Cigars   • Smokeless tobacco: Not on file   • Alcohol use Yes      Comment: Moderate \"few beers daily\"   • Drug use: No   • Sexual activity: Defer     Other Topics Concern   • Not on file     Social History Narrative         FAMILY HISTORY:  Family History   Problem Relation Age of " "Onset   • Cancer Other      LYMPH & BREAST   • Diabetes Other    • Hypertension Other    • Heart disease Other    • Breast cancer Mother    • Lymphoma Father    • Hemochromatosis Father        REVIEW OF SYSTEMS:  GENERAL: No change in appetite or weight;   No fevers, chills, sweats.    SKIN: No nonhealing lesions.   No rashes.  HEME/LYMPH: No easy bruising, bleeding.   No swollen nodes.   EYES: No vision changes or diplopia.   ENT: No tinnitus, hearing loss, gum bleeding, epistaxis, hoarseness or dysphagia.   RESPIRATORY: No cough, shortness of breath, hemoptysis or wheezing.   CVS: No chest pain, palpitations, orthopnea, dyspnea on exertion or PND.   GI: No melena or hematochezia.   No abdominal pain.  No nausea, vomiting, constipation, diarrhea  : No lower tract obstructive symptoms, dysuria or hematuria.   MUSCULOSKELETAL: No bone pain.  No joint stiffness.   NEUROLOGICAL: No global weakness, loss of consciousness or seizures.   PSYCHIATRIC: No increased nervousness, mood changes or depression.     Objective    Vitals:    01/25/18 0943   BP: 152/86   Pulse: 82   Resp: 18   Temp: 98.1 °F (36.7 °C)   TempSrc: Oral   SpO2: 97%   Weight: 101 kg (223 lb)   Height: 169.8 cm (66.85\")   PainSc: 0-No pain     Current Status 1/25/2018   ECOG score 0      PHYSICAL EXAM:    GENERAL:  Well-developed, well-nourished in no acute distress.   SKIN:  Warm, dry without rashes, purpura or petechiae.  EYES:  Pupils equal, round and reactive to light.  EOMs intact.  Conjunctivae normal.  EARS:  Hearing intact.  NOSE:  Septum midline.  No excoriations or nasal discharge.  MOUTH:  Tongue is well-papillated; no stomatitis or ulcers.  Lips normal.  THROAT:  Oropharynx without lesions or exudates.  NECK:  Supple with good range of motion; no thyromegaly or masses, no JVD.  LYMPHATICS:  No cervical, supraclavicular, axillary or inguinal adenopathy.  CHEST:  Lungs clear to auscultation. Good airflow.  CARDIAC:  Regular rate and rhythm " without murmurs, rubs or gallops. Normal S1,S2.  ABDOMEN:  Soft, nontender with no hepatosplenomegaly or masses.  EXTREMITIES:  No clubbing, cyanosis or edema.  NEUROLOGICAL:  Cranial Nerves II-XII grossly intact.  No focal neurological deficits.  PSYCHIATRIC:  Normal affect and mood.     RECENT LABS:        WBC   Date/Time Value Ref Range Status   01/25/2018 09:31 AM 10.42 (H) 4.00 - 10.00 10*3/mm3 Final     Hemoglobin   Date/Time Value Ref Range Status   01/25/2018 09:31 AM 14.5 13.5 - 16.5 g/dL Final     Platelets   Date/Time Value Ref Range Status   01/25/2018 09:31  150 - 375 10*3/mm3 Final       1/25/2018   Iron 59 - 158 mcg/dL 107   TIBC 249 - 505 mcg/dL 417   Ferritin 30.00 - 400.00 ng/mL 29.80 (A)   Iron Saturation 14 - 48 % 26       Assessment/Plan      Hereditary hemochromatosis:Last phlebo was 10/2017 and with ferritin of 30 today, no need today. We will plan q 6 months surveillance.

## 2018-02-01 ENCOUNTER — CLINICAL SUPPORT (OUTPATIENT)
Dept: ORTHOPEDIC SURGERY | Facility: CLINIC | Age: 68
End: 2018-02-01

## 2018-02-01 VITALS — BODY MASS INDEX: 35.16 KG/M2 | WEIGHT: 224 LBS | HEIGHT: 67 IN | TEMPERATURE: 97.3 F

## 2018-02-01 DIAGNOSIS — M17.12 ARTHRITIS OF LEFT KNEE: ICD-10-CM

## 2018-02-01 DIAGNOSIS — G89.29 CHRONIC PAIN OF LEFT KNEE: Primary | ICD-10-CM

## 2018-02-01 DIAGNOSIS — M25.562 CHRONIC PAIN OF LEFT KNEE: Primary | ICD-10-CM

## 2018-02-01 PROCEDURE — 20610 DRAIN/INJ JOINT/BURSA W/O US: CPT | Performed by: ORTHOPAEDIC SURGERY

## 2018-02-01 PROCEDURE — 99212 OFFICE O/P EST SF 10 MIN: CPT | Performed by: ORTHOPAEDIC SURGERY

## 2018-02-01 RX ORDER — LIDOCAINE HYDROCHLORIDE 10 MG/ML
2 INJECTION, SOLUTION EPIDURAL; INFILTRATION; INTRACAUDAL; PERINEURAL
Status: COMPLETED | OUTPATIENT
Start: 2018-02-01 | End: 2018-02-01

## 2018-02-01 RX ORDER — BUPIVACAINE HYDROCHLORIDE 5 MG/ML
2 INJECTION, SOLUTION EPIDURAL; INTRACAUDAL
Status: COMPLETED | OUTPATIENT
Start: 2018-02-01 | End: 2018-02-01

## 2018-02-01 RX ORDER — METHYLPREDNISOLONE ACETATE 80 MG/ML
80 INJECTION, SUSPENSION INTRA-ARTICULAR; INTRALESIONAL; INTRAMUSCULAR; SOFT TISSUE
Status: COMPLETED | OUTPATIENT
Start: 2018-02-01 | End: 2018-02-01

## 2018-02-01 RX ADMIN — LIDOCAINE HYDROCHLORIDE 2 ML: 10 INJECTION, SOLUTION EPIDURAL; INFILTRATION; INTRACAUDAL; PERINEURAL at 08:35

## 2018-02-01 RX ADMIN — BUPIVACAINE HYDROCHLORIDE 2 ML: 5 INJECTION, SOLUTION EPIDURAL; INTRACAUDAL at 08:35

## 2018-02-01 RX ADMIN — METHYLPREDNISOLONE ACETATE 80 MG: 80 INJECTION, SUSPENSION INTRA-ARTICULAR; INTRALESIONAL; INTRAMUSCULAR; SOFT TISSUE at 08:35

## 2018-02-01 NOTE — PROGRESS NOTES
Patient: Loyd Avalos  YOB: 1950    Chief Complaints:  left knee pain    Subjective:    History of Present Illness: Here today for knee pain. The pain is a generalized joint tenderness.  It has been progressive in nature but remains intermittent.  Worsened by prolonged standing or walking and squatting activities. Has had improvement in the past with ice/heat, rest, and injections.     This problem is new to this examiner.     Allergies: No Known Allergies    Medications:   Home Medications:  Current Outpatient Prescriptions on File Prior to Visit   Medication Sig   • allopurinol (ZYLOPRIM) 300 MG tablet Take 300 mg by mouth Daily.   • aspirin 81 MG EC tablet Take 81 mg by mouth daily.   • atorvastatin (LIPITOR) 40 MG tablet Take 40 mg by mouth Daily.   • ibuprofen (ADVIL,MOTRIN) 800 MG tablet Take 800 mg by mouth Every 6 (Six) Hours As Needed.   • lisinopril (PRINIVIL,ZESTRIL) 10 MG tablet Take 10 mg by mouth Daily.   • metFORMIN (GLUCOPHAGE) 500 MG tablet Take 500 mg by mouth Daily With Breakfast.   • metoprolol succinate XL (TOPROL-XL) 25 MG 24 hr tablet Take 25 mg by mouth Daily.   • Multiple Vitamins-Minerals (MULTIVITAMIN ADULT PO) Take  by mouth Daily.     No current facility-administered medications on file prior to visit.      Current Medications:  Scheduled Meds:  Continuous Infusions:  No current facility-administered medications for this visit.   PRN Meds:.    I have reviewed the patient's medical history in detail and updated the computerized patient record.  Review and summarization of old records include:    Past Medical History:   Diagnosis Date   • Diabetes    • Heart disease     Status post pacemaker implantation   • Hemochromatosis    • Sleep apnea         Past Surgical History:   Procedure Laterality Date   • KNEE SURGERY Right    • PACEMAKER IMPLANTATION  05/2006        Social History     Occupational History   • Retired      Social History Main Topics   • Smoking  "status: Former Smoker     Types: Cigars   • Smokeless tobacco: Never Used   • Alcohol use Yes      Comment: Moderate \"few beers daily\"   • Drug use: No   • Sexual activity: Defer      Social History     Social History Narrative        Family History   Problem Relation Age of Onset   • Cancer Other      LYMPH & BREAST   • Diabetes Other    • Hypertension Other    • Heart disease Other    • Breast cancer Mother    • Lymphoma Father    • Hemochromatosis Father        ROS: 14 point review of systems was performed and was negative except for documented findings in HPI and today's encounter.     Allergies: No Known Allergies  Constitutional:  Denies fever, shaking or chills   Eyes:  Denies change in visual acuity   HENT:  Denies nasal congestion or sore throat   Respiratory:  Denies cough or shortness of breath   Cardiovascular:  Denies chest pain or severe LE edema   GI:  Denies abdominal pain, nausea, vomiting, bloody stools or diarrhea   Musculoskeletal:  Numbness, tingling, or loss of motor function only as noted above in history of present illness.  : Denies painful urination or hematuria  Integument:  Denies rash, lesion or ulceration   Neurologic:  Denies headache or focal weakness  Endocrine:  Denies lymphadenopathy  Psych:  Denies confusion or change in mental status   Hem:  Denies active bleeding    Physical Exam:  Body mass index is 35.08 kg/(m^2).  Vitals:    02/01/18 0833   Temp: 97.3 °F (36.3 °C)     Vital Signs:  reviewed  Constitutional: Awake alert and oriented x3, well developed, no acute distress, non-toxic appearance.  EYES: symmetric, sclera clear  ENT:  Normocephalic, Atraumatic.   Respiratory:  No respiratory distress, No wheezing  CV: pulse regular, no palpitations or pallor.  GI:  Abdomen soft, non-tender.   Vascular:  Intact distal pulses, No cyanosis, no signs or symptoms of DVT.  Neurologic: Sensation grossly intact to the involved extremity, No focal deficits noted.   Neck: No tenderness, " Supple.  Integument: warm, dry, no ulcerations.   Psychiatric:  Oriented, no pathological affect.  Musculoskeletal:    Affected knee(s):  Painful gait with a subtle limp, positive for synovitis, swelling, joint effusion with crepitation.  Lachman negative  Posterior drawer negative  Tereza's negative  Patellofemoral grind +  Sensation grossly intact to light touch throughout the lower extremity  Skin is intact  Distal pulses are palpable  No signs or symptoms of DVT        Diagnostic Data:     Imaging was done previously in the office and discussed at length with the patient:    Indication: pain related symptoms,  Views: 3V AP, LAT & 40 degree PA left knee(s)   Findings: advanced arthritis  Comparison views: viewed last xray done in the office.     Procedure:  Large Joint Arthrocentesis  Date/Time: 2/1/2018 8:35 AM  Consent given by: patient  Site marked: site marked  Timeout: Immediately prior to procedure a time out was called to verify the correct patient, procedure, equipment, support staff and site/side marked as required   Supporting Documentation  Indications: pain   Procedure Details  Location: knee - L knee  Needle size: 25 G  Approach: anteromedial  Medications administered: 80 mg methylPREDNISolone acetate 80 MG/ML; 2 mL lidocaine PF 1% 1 %; 2 mL bupivacaine (PF) 0.5 %  Patient tolerance: patient tolerated the procedure well with no immediate complications          Assessment:     ICD-10-CM ICD-9-CM   1. Chronic pain of left knee M25.562 719.46    G89.29 338.29   2. Arthritis of left knee M17.12 716.96           Plan: Is to proceed with injection  Follow up as indicated.  Ice, elevate, and rest as needed.  Additional interventions include: Biomechanics of pertinent body area discussed.  Risks, benefits, alternatives, comparisons, and complications of accepted medicines, injections, recommendations, surgical procedures, and therapies explained and education provided in laymen's terms. The patient was  given the opportunity to ask questions and they were answerved to their satisfaction.   Natural history and expected course of this patient's diagnosis discussed along with evaluation of therapies. Questions answered.  Cortisone Injection. See procedure note.  Cryotherapy/brachy therapy as indicated with instructions.   PT referral offered and declined, advised on stretching/strengthening exercises for self at home.    2/1/2018

## 2018-04-23 DIAGNOSIS — E83.119 HEMOCHROMATOSIS, UNSPECIFIED HEMOCHROMATOSIS TYPE: Primary | ICD-10-CM

## 2018-04-25 ENCOUNTER — APPOINTMENT (OUTPATIENT)
Dept: LAB | Facility: HOSPITAL | Age: 68
End: 2018-04-25

## 2018-04-25 ENCOUNTER — APPOINTMENT (OUTPATIENT)
Dept: ONCOLOGY | Facility: HOSPITAL | Age: 68
End: 2018-04-25

## 2018-04-27 ENCOUNTER — INFUSION (OUTPATIENT)
Dept: ONCOLOGY | Facility: HOSPITAL | Age: 68
End: 2018-04-27

## 2018-04-27 ENCOUNTER — LAB (OUTPATIENT)
Dept: LAB | Facility: HOSPITAL | Age: 68
End: 2018-04-27

## 2018-04-27 VITALS
DIASTOLIC BLOOD PRESSURE: 76 MMHG | WEIGHT: 215 LBS | SYSTOLIC BLOOD PRESSURE: 147 MMHG | BODY MASS INDEX: 33.67 KG/M2 | HEART RATE: 93 BPM

## 2018-04-27 DIAGNOSIS — E83.119 HEMOCHROMATOSIS, UNSPECIFIED HEMOCHROMATOSIS TYPE: ICD-10-CM

## 2018-04-27 DIAGNOSIS — E83.110 HEREDITARY HEMOCHROMATOSIS (HCC): Primary | ICD-10-CM

## 2018-04-27 LAB
BASOPHILS # BLD AUTO: 0.1 10*3/MM3 (ref 0–0.1)
BASOPHILS NFR BLD AUTO: 0.8 % (ref 0–1.1)
DEPRECATED RDW RBC AUTO: 49.6 FL (ref 37–49)
EOSINOPHIL # BLD AUTO: 0.96 10*3/MM3 (ref 0–0.36)
EOSINOPHIL NFR BLD AUTO: 7.9 % (ref 1–5)
ERYTHROCYTE [DISTWIDTH] IN BLOOD BY AUTOMATED COUNT: 13.8 % (ref 11.7–14.5)
FERRITIN SERPL-MCNC: 86.5 NG/ML (ref 30–400)
HCT VFR BLD AUTO: 42.3 % (ref 40–49)
HGB BLD-MCNC: 14.3 G/DL (ref 13.5–16.5)
IMM GRANULOCYTES # BLD: 0.04 10*3/MM3 (ref 0–0.03)
IMM GRANULOCYTES NFR BLD: 0.3 % (ref 0–0.5)
IRON 24H UR-MRATE: 90 MCG/DL (ref 59–158)
IRON SATN MFR SERPL: 23 % (ref 14–48)
LYMPHOCYTES # BLD AUTO: 5.01 10*3/MM3 (ref 1–3.5)
LYMPHOCYTES NFR BLD AUTO: 41.5 % (ref 20–49)
MCH RBC QN AUTO: 33.1 PG (ref 27–33)
MCHC RBC AUTO-ENTMCNC: 33.8 G/DL (ref 32–35)
MCV RBC AUTO: 97.9 FL (ref 83–97)
MONOCYTES # BLD AUTO: 1.07 10*3/MM3 (ref 0.25–0.8)
MONOCYTES NFR BLD AUTO: 8.9 % (ref 4–12)
NEUTROPHILS # BLD AUTO: 4.9 10*3/MM3 (ref 1.5–7)
NEUTROPHILS NFR BLD AUTO: 40.6 % (ref 39–75)
NRBC BLD MANUAL-RTO: 0 /100 WBC (ref 0–0)
PLATELET # BLD AUTO: 219 10*3/MM3 (ref 150–375)
PMV BLD AUTO: 9.3 FL (ref 8.9–12.1)
RBC # BLD AUTO: 4.32 10*6/MM3 (ref 4.3–5.5)
TIBC SERPL-MCNC: 399 MCG/DL (ref 249–505)
TRANSFERRIN SERPL-MCNC: 285 MG/DL (ref 200–360)
WBC NRBC COR # BLD: 12.08 10*3/MM3 (ref 4–10)

## 2018-04-27 PROCEDURE — 85025 COMPLETE CBC W/AUTO DIFF WBC: CPT | Performed by: INTERNAL MEDICINE

## 2018-04-27 PROCEDURE — 99195 PHLEBOTOMY: CPT | Performed by: INTERNAL MEDICINE

## 2018-04-27 PROCEDURE — 36415 COLL VENOUS BLD VENIPUNCTURE: CPT | Performed by: INTERNAL MEDICINE

## 2018-04-27 PROCEDURE — 82728 ASSAY OF FERRITIN: CPT | Performed by: INTERNAL MEDICINE

## 2018-04-27 PROCEDURE — 84466 ASSAY OF TRANSFERRIN: CPT | Performed by: INTERNAL MEDICINE

## 2018-04-27 PROCEDURE — 83540 ASSAY OF IRON: CPT | Performed by: INTERNAL MEDICINE

## 2018-04-27 RX ORDER — SODIUM CHLORIDE 9 MG/ML
250 INJECTION, SOLUTION INTRAVENOUS ONCE
Status: DISCONTINUED | OUTPATIENT
Start: 2018-04-27 | End: 2018-04-27 | Stop reason: HOSPADM

## 2018-04-27 RX ORDER — SODIUM CHLORIDE 9 MG/ML
250 INJECTION, SOLUTION INTRAVENOUS ONCE
Status: CANCELLED | OUTPATIENT
Start: 2018-04-27

## 2018-05-15 ENCOUNTER — CLINICAL SUPPORT (OUTPATIENT)
Dept: ORTHOPEDIC SURGERY | Facility: CLINIC | Age: 68
End: 2018-05-15

## 2018-05-15 VITALS — WEIGHT: 213 LBS | HEIGHT: 67 IN | TEMPERATURE: 98.4 F | BODY MASS INDEX: 33.43 KG/M2

## 2018-05-15 DIAGNOSIS — M25.562 CHRONIC PAIN OF LEFT KNEE: Primary | ICD-10-CM

## 2018-05-15 DIAGNOSIS — M17.12 ARTHRITIS OF LEFT KNEE: ICD-10-CM

## 2018-05-15 DIAGNOSIS — G89.29 CHRONIC PAIN OF LEFT KNEE: Primary | ICD-10-CM

## 2018-05-15 PROCEDURE — 20610 DRAIN/INJ JOINT/BURSA W/O US: CPT | Performed by: NURSE PRACTITIONER

## 2018-05-15 PROCEDURE — 99212 OFFICE O/P EST SF 10 MIN: CPT | Performed by: NURSE PRACTITIONER

## 2018-05-15 PROCEDURE — 73562 X-RAY EXAM OF KNEE 3: CPT | Performed by: NURSE PRACTITIONER

## 2018-05-15 RX ORDER — LIDOCAINE HYDROCHLORIDE 20 MG/ML
4 INJECTION, SOLUTION EPIDURAL; INFILTRATION; INTRACAUDAL; PERINEURAL
Status: COMPLETED | OUTPATIENT
Start: 2018-05-15 | End: 2018-05-15

## 2018-05-15 RX ORDER — METHYLPREDNISOLONE ACETATE 80 MG/ML
80 INJECTION, SUSPENSION INTRA-ARTICULAR; INTRALESIONAL; INTRAMUSCULAR; SOFT TISSUE
Status: COMPLETED | OUTPATIENT
Start: 2018-05-15 | End: 2018-05-15

## 2018-05-15 RX ADMIN — METHYLPREDNISOLONE ACETATE 80 MG: 80 INJECTION, SUSPENSION INTRA-ARTICULAR; INTRALESIONAL; INTRAMUSCULAR; SOFT TISSUE at 11:23

## 2018-05-15 RX ADMIN — LIDOCAINE HYDROCHLORIDE 4 ML: 20 INJECTION, SOLUTION EPIDURAL; INFILTRATION; INTRACAUDAL; PERINEURAL at 11:23

## 2018-05-15 NOTE — PROGRESS NOTES
Patient: Loyd Avalos  YOB: 1950    Chief Complaints:  left knee pain    Subjective:    History of Present Illness: Here today for knee pain. The pain is a generalized joint tenderness.  It has been progressive in nature but remains intermittent.  Worsened by prolonged standing or walking and squatting activities. Has had improvement in the past with ice/heat, rest, and injections.     This problem is not new to this examiner.     Allergies: No Known Allergies    Medications:   Home Medications:  Current Outpatient Prescriptions on File Prior to Visit   Medication Sig   • allopurinol (ZYLOPRIM) 300 MG tablet Take 300 mg by mouth Daily.   • aspirin 81 MG EC tablet Take 81 mg by mouth daily.   • atorvastatin (LIPITOR) 40 MG tablet Take 40 mg by mouth Daily.   • ibuprofen (ADVIL,MOTRIN) 800 MG tablet Take 800 mg by mouth Every 6 (Six) Hours As Needed.   • lisinopril (PRINIVIL,ZESTRIL) 10 MG tablet Take 10 mg by mouth Daily.   • metFORMIN (GLUCOPHAGE) 500 MG tablet Take 500 mg by mouth Daily With Breakfast.   • metoprolol succinate XL (TOPROL-XL) 25 MG 24 hr tablet Take 25 mg by mouth Daily.   • Multiple Vitamins-Minerals (MULTIVITAMIN ADULT PO) Take  by mouth Daily.     No current facility-administered medications on file prior to visit.      Current Medications:  Scheduled Meds:  Continuous Infusions:  No current facility-administered medications for this visit.   PRN Meds:.    I have reviewed the patient's medical history in detail and updated the computerized patient record.  Review and summarization of old records include:    Past Medical History:   Diagnosis Date   • Diabetes    • Heart disease     Status post pacemaker implantation   • Hemochromatosis    • Sleep apnea         Past Surgical History:   Procedure Laterality Date   • KNEE SURGERY Right    • PACEMAKER IMPLANTATION  05/2006        Social History     Occupational History   • Retired      Social History Main Topics   • Smoking  "status: Former Smoker     Types: Cigars   • Smokeless tobacco: Never Used   • Alcohol use Yes      Comment: Moderate \"few beers daily\"   • Drug use: No   • Sexual activity: Defer      Social History     Social History Narrative   • No narrative on file        Family History   Problem Relation Age of Onset   • Cancer Other      LYMPH & BREAST   • Diabetes Other    • Hypertension Other    • Heart disease Other    • Breast cancer Mother    • Lymphoma Father    • Hemochromatosis Father        ROS: 14 point review of systems was performed and was negative except for documented findings in HPI and today's encounter.     Allergies: No Known Allergies  Constitutional:  Denies fever, shaking or chills   Eyes:  Denies change in visual acuity   HENT:  Denies nasal congestion or sore throat   Respiratory:  Denies cough or shortness of breath   Cardiovascular:  Denies chest pain or severe LE edema   GI:  Denies abdominal pain, nausea, vomiting, bloody stools or diarrhea   Musculoskeletal:  Numbness, tingling, or loss of motor function only as noted above in history of present illness.  : Denies painful urination or hematuria  Integument:  Denies rash, lesion or ulceration   Neurologic:  Denies headache or focal weakness  Endocrine:  Denies lymphadenopathy  Psych:  Denies confusion or change in mental status   Hem:  Denies active bleeding    Physical Exam:  Wt Readings from Last 3 Encounters:   05/15/18 96.6 kg (213 lb)   04/27/18 97.5 kg (215 lb)   02/01/18 102 kg (224 lb)     Ht Readings from Last 3 Encounters:   05/15/18 170.2 cm (67\")   02/01/18 170.2 cm (67\")   01/25/18 169.8 cm (66.85\")     Body mass index is 33.36 kg/m².  Facility age limit for growth percentiles is 20 years.  Vitals:    05/15/18 1121   Temp: 98.4 °F (36.9 °C)     Vital Signs:  reviewed  Constitutional: Awake alert and oriented x3, well developed, no acute distress, non-toxic appearance.  EYES: symmetric, sclera clear  ENT:  Normocephalic, Atraumatic. "   Respiratory:  No respiratory distress, No wheezing  CV: pulse regular, no palpitations or pallor.  GI:  Abdomen soft, non-tender.   Vascular:  Intact distal pulses, No cyanosis, no signs or symptoms of DVT.  Neurologic: Sensation grossly intact to the involved extremity, No focal deficits noted.   Neck: No tenderness, Supple.  Integument: warm, dry, no ulcerations.   Psychiatric:  Oriented, no pathological affect.  Musculoskeletal:    Affected knee(s):  Painful gait with a subtle limp, positive for synovitis, swelling, joint effusion with crepitation.  Lachman negative  Posterior drawer negative  Tereza's negative  Patellofemoral grind +  Sensation grossly intact to light touch throughout the lower extremity  Skin is intact  Distal pulses are palpable  No signs or symptoms of DVT        Diagnostic Data:     Imaging was done today, images were personally viewed and discussed with the patient:    Indication: pain related symptoms,  Views: 3V AP, LAT & 40 degree PA left knee(s)   Findings: severe end-stage arthritis (bone on bone, subchondral sclerosis/cysts, osteophytes), no evidence of change or lesions on xrays (rosie with new prostate CA dx)  Comparison views: viewed last xray done in the office.     Procedure:  Large Joint Arthrocentesis  Date/Time: 5/15/2018 11:23 AM  Consent given by: patient  Site marked: site marked  Timeout: Immediately prior to procedure a time out was called to verify the correct patient, procedure, equipment, support staff and site/side marked as required   Supporting Documentation  Indications: pain and joint swelling   Procedure Details  Location: knee - L knee  Preparation: Patient was prepped and draped in the usual sterile fashion  Needle size: 22 G  Approach: anterolateral  Medications administered: 4 mL lidocaine PF 2% 2 %; 80 mg methylPREDNISolone acetate 80 MG/ML  Patient tolerance: patient tolerated the procedure well with no immediate complications          Assessment:      ICD-10-CM ICD-9-CM   1. Chronic pain of left knee M25.562 719.46    G89.29 338.29   2. Arthritis of left knee M17.12 716.96           Plan: Is to proceed with injection  Follow up as indicated.  Ice, elevate, and rest as needed.  Additional interventions include:  12 min spent face to face with patient 8 min spent counseling about natural history and expected course of assessed complaint and reviewed treatment options that have been tried and not tried and those currently available. Questions answered.    Natural history and expected course of this patient's diagnosis discussed along with evaluation of therapies. Questions answered.  Advice on benefits of, and types of regular/moderate exercise including biomechanical forces involved as it pertains to this complaint, with a goal of 5 min at least 7 times a week.    Address and update of wt loss, physical exercises, use of assistive devices, and monitoring of Medications per orders to address ortho complaints; Evaluation and discussion of safety, precautions, side effects, and warnings given especially of long term NSAID therapy.  Lifestyle measures for weight loss, suggest starting at 0lbs, with biomechanical explanations for weight loss and how this affects orthopedic condition.  Cortisone Injection. See procedure note.  OTC analgesics as needed with dosage warning and instructions given.  Cryotherapy/brachy therapy as indicated with instructions.   Had to cancel and postpone for 2 weeks and did ok with that will try 4 month f/u for next appt and see if this works for him.  Was newly dx with prostate CA and is going through treatment for this which is why appt got delayed until today.  no evidence of change or lesions on xrays (rosie with new prostate CA dx)  Wish him healing for this as well.   5/15/2018  artemio

## 2018-07-23 RX ORDER — SODIUM CHLORIDE 9 MG/ML
250 INJECTION, SOLUTION INTRAVENOUS ONCE
Status: CANCELLED | OUTPATIENT
Start: 2018-07-25

## 2018-08-06 ENCOUNTER — LAB (OUTPATIENT)
Dept: LAB | Facility: HOSPITAL | Age: 68
End: 2018-08-06

## 2018-08-06 ENCOUNTER — INFUSION (OUTPATIENT)
Dept: ONCOLOGY | Facility: HOSPITAL | Age: 68
End: 2018-08-06

## 2018-08-06 VITALS — WEIGHT: 210.3 LBS | BODY MASS INDEX: 32.94 KG/M2

## 2018-08-06 DIAGNOSIS — E83.119 HEMOCHROMATOSIS, UNSPECIFIED HEMOCHROMATOSIS TYPE: ICD-10-CM

## 2018-08-06 LAB
BASOPHILS # BLD AUTO: 0.11 10*3/MM3 (ref 0–0.1)
BASOPHILS NFR BLD AUTO: 1.3 % (ref 0–1.1)
DEPRECATED RDW RBC AUTO: 46.5 FL (ref 37–49)
EOSINOPHIL # BLD AUTO: 0.49 10*3/MM3 (ref 0–0.36)
EOSINOPHIL NFR BLD AUTO: 5.7 % (ref 1–5)
ERYTHROCYTE [DISTWIDTH] IN BLOOD BY AUTOMATED COUNT: 13.2 % (ref 11.7–14.5)
FERRITIN SERPL-MCNC: 41.9 NG/ML (ref 30–400)
HCT VFR BLD AUTO: 42.8 % (ref 40–49)
HGB BLD-MCNC: 14.5 G/DL (ref 13.5–16.5)
IMM GRANULOCYTES # BLD: 0.01 10*3/MM3 (ref 0–0.03)
IMM GRANULOCYTES NFR BLD: 0.1 % (ref 0–0.5)
IRON 24H UR-MRATE: 135 MCG/DL (ref 59–158)
IRON SATN MFR SERPL: 34 % (ref 14–48)
LYMPHOCYTES # BLD AUTO: 3.55 10*3/MM3 (ref 1–3.5)
LYMPHOCYTES NFR BLD AUTO: 41.4 % (ref 20–49)
MCH RBC QN AUTO: 33 PG (ref 27–33)
MCHC RBC AUTO-ENTMCNC: 33.9 G/DL (ref 32–35)
MCV RBC AUTO: 97.5 FL (ref 83–97)
MONOCYTES # BLD AUTO: 0.85 10*3/MM3 (ref 0.25–0.8)
MONOCYTES NFR BLD AUTO: 9.9 % (ref 4–12)
NEUTROPHILS # BLD AUTO: 3.57 10*3/MM3 (ref 1.5–7)
NEUTROPHILS NFR BLD AUTO: 41.6 % (ref 39–75)
NRBC BLD MANUAL-RTO: 0 /100 WBC (ref 0–0)
PLATELET # BLD AUTO: 172 10*3/MM3 (ref 150–375)
PMV BLD AUTO: 9.5 FL (ref 8.9–12.1)
RBC # BLD AUTO: 4.39 10*6/MM3 (ref 4.3–5.5)
TIBC SERPL-MCNC: 400 MCG/DL (ref 249–505)
TRANSFERRIN SERPL-MCNC: 286 MG/DL (ref 200–360)
WBC NRBC COR # BLD: 8.58 10*3/MM3 (ref 4–10)

## 2018-08-06 PROCEDURE — 36415 COLL VENOUS BLD VENIPUNCTURE: CPT | Performed by: INTERNAL MEDICINE

## 2018-08-06 PROCEDURE — 82728 ASSAY OF FERRITIN: CPT | Performed by: INTERNAL MEDICINE

## 2018-08-06 PROCEDURE — 83540 ASSAY OF IRON: CPT | Performed by: INTERNAL MEDICINE

## 2018-08-06 PROCEDURE — 85025 COMPLETE CBC W/AUTO DIFF WBC: CPT | Performed by: INTERNAL MEDICINE

## 2018-08-06 PROCEDURE — 84466 ASSAY OF TRANSFERRIN: CPT | Performed by: INTERNAL MEDICINE

## 2018-09-17 ENCOUNTER — CLINICAL SUPPORT (OUTPATIENT)
Dept: ORTHOPEDIC SURGERY | Facility: CLINIC | Age: 68
End: 2018-09-17

## 2018-09-17 VITALS — BODY MASS INDEX: 32.49 KG/M2 | TEMPERATURE: 98.3 F | WEIGHT: 207 LBS | HEIGHT: 67 IN

## 2018-09-17 DIAGNOSIS — M17.12 ARTHRITIS OF LEFT KNEE: Primary | ICD-10-CM

## 2018-09-17 PROCEDURE — 20610 DRAIN/INJ JOINT/BURSA W/O US: CPT | Performed by: NURSE PRACTITIONER

## 2018-09-17 RX ORDER — LIDOCAINE HYDROCHLORIDE 20 MG/ML
2 INJECTION, SOLUTION EPIDURAL; INFILTRATION; INTRACAUDAL; PERINEURAL
Status: COMPLETED | OUTPATIENT
Start: 2018-09-17 | End: 2018-09-17

## 2018-09-17 RX ORDER — METHYLPREDNISOLONE ACETATE 80 MG/ML
80 INJECTION, SUSPENSION INTRA-ARTICULAR; INTRALESIONAL; INTRAMUSCULAR; SOFT TISSUE
Status: COMPLETED | OUTPATIENT
Start: 2018-09-17 | End: 2018-09-17

## 2018-09-17 RX ADMIN — LIDOCAINE HYDROCHLORIDE 2 ML: 20 INJECTION, SOLUTION EPIDURAL; INFILTRATION; INTRACAUDAL; PERINEURAL at 10:40

## 2018-09-17 RX ADMIN — METHYLPREDNISOLONE ACETATE 80 MG: 80 INJECTION, SUSPENSION INTRA-ARTICULAR; INTRALESIONAL; INTRAMUSCULAR; SOFT TISSUE at 10:40

## 2018-09-17 NOTE — PROGRESS NOTES
Patient: Loyd Avalos  YOB: 1950    Chief Complaints:  left knee pain    Subjective:    History of Present Illness: Here today for knee pain. Is finishing his radiation therapy, did well going 4 months since last cortisone inj. The pain is a generalized joint tenderness.  It has been progressive in nature but remains intermittent.  Worsened by prolonged standing or walking and squatting activities. Has had improvement in the past with ice/heat, rest, and injections.     This problem is not new to this examiner.     Allergies: No Known Allergies    Medications:   Home Medications:  Current Outpatient Prescriptions on File Prior to Visit   Medication Sig   • allopurinol (ZYLOPRIM) 300 MG tablet Take 300 mg by mouth Daily.   • aspirin 81 MG EC tablet Take 81 mg by mouth daily.   • atorvastatin (LIPITOR) 40 MG tablet Take 40 mg by mouth Daily.   • ibuprofen (ADVIL,MOTRIN) 800 MG tablet Take 800 mg by mouth Every 6 (Six) Hours As Needed.   • lisinopril (PRINIVIL,ZESTRIL) 10 MG tablet Take 10 mg by mouth Daily.   • metFORMIN (GLUCOPHAGE) 500 MG tablet Take 500 mg by mouth Daily With Breakfast.   • metoprolol succinate XL (TOPROL-XL) 25 MG 24 hr tablet Take 25 mg by mouth Daily.   • Multiple Vitamins-Minerals (MULTIVITAMIN ADULT PO) Take  by mouth Daily.     No current facility-administered medications on file prior to visit.      Current Medications:  Scheduled Meds:  Continuous Infusions:  No current facility-administered medications for this visit.   PRN Meds:.    I have reviewed the patient's medical history in detail and updated the computerized patient record.  Review and summarization of old records include:    Past Medical History:   Diagnosis Date   • Diabetes (CMS/HCC)    • Heart disease     Status post pacemaker implantation   • Hemochromatosis    • Sleep apnea         Past Surgical History:   Procedure Laterality Date   • KNEE SURGERY Right    • PACEMAKER IMPLANTATION  05/2006       "  Social History     Occupational History   • Retired      Social History Main Topics   • Smoking status: Former Smoker     Types: Cigars   • Smokeless tobacco: Never Used   • Alcohol use Yes      Comment: Moderate \"few beers daily\"   • Drug use: No   • Sexual activity: Defer    Social History     Social History Narrative   • No narrative on file        Family History   Problem Relation Age of Onset   • Cancer Other         LYMPH & BREAST   • Diabetes Other    • Hypertension Other    • Heart disease Other    • Breast cancer Mother    • Lymphoma Father    • Hemochromatosis Father        ROS: 14 point review of systems was performed and was negative except for documented findings in HPI and today's encounter.     Allergies: No Known Allergies  Constitutional:  Denies fever, shaking or chills   Eyes:  Denies change in visual acuity   HENT:  Denies nasal congestion or sore throat   Respiratory:  Denies cough or shortness of breath   Cardiovascular:  Denies chest pain or severe LE edema   GI:  Denies abdominal pain, nausea, vomiting, bloody stools or diarrhea   Musculoskeletal:  Numbness, tingling, or loss of motor function only as noted above in history of present illness.  : Denies painful urination or hematuria  Integument:  Denies rash, lesion or ulceration   Neurologic:  Denies headache or focal weakness  Endocrine:  Denies lymphadenopathy  Psych:  Denies confusion or change in mental status   Hem:  Denies active bleeding    Physical Exam:  Wt Readings from Last 3 Encounters:   09/17/18 93.9 kg (207 lb)   08/06/18 95.4 kg (210 lb 4.8 oz)   05/15/18 96.6 kg (213 lb)     Ht Readings from Last 3 Encounters:   09/17/18 170.2 cm (67\")   05/15/18 170.2 cm (67\")   02/01/18 170.2 cm (67\")     Body mass index is 32.42 kg/m².  Facility age limit for growth percentiles is 20 years.  Vitals:    09/17/18 1039   Temp: 98.3 °F (36.8 °C)     Vital Signs:  reviewed  Constitutional: Awake alert and oriented x3, well developed, no " acute distress, non-toxic appearance.  EYES: symmetric, sclera clear  ENT:  Normocephalic, Atraumatic.   Respiratory:  No respiratory distress, No wheezing  CV: pulse regular, no palpitations or pallor.  GI:  Abdomen soft, non-tender.   Vascular:  Intact distal pulses, No cyanosis, no signs or symptoms of DVT.  Neurologic: Sensation grossly intact to the involved extremity, No focal deficits noted.   Neck: No tenderness, Supple.  Integument: warm, dry, no ulcerations.   Psychiatric:  Oriented, no pathological affect.  Musculoskeletal:    Affected knee(s):  Painful gait with a subtle limp, positive for synovitis, swelling, joint effusion with crepitation.  Lachman negative  Posterior drawer negative  Tereza's negative  Patellofemoral grind +  Sensation grossly intact to light touch throughout the lower extremity  Skin is intact  Distal pulses are palpable  No signs or symptoms of DVT          Procedure:  See separate procedure note.  Procedures    Assessment:     ICD-10-CM ICD-9-CM   1. Arthritis of left knee M17.12 716.96           Plan: Is to proceed with injection  Follow up as indicated.  Ice, elevate, and rest as needed.  Additional interventions include: Cortisone Injection. See procedure note.  Cryotherapy/brachy therapy as indicated with instructions.   Is finishing his radiation therapy today.  4 mo f/u for injection, has done well with 4 months intervals.  9/17/2018  JAX

## 2018-09-17 NOTE — PROGRESS NOTES
Large Joint Arthrocentesis  Date/Time: 9/17/2018 10:40 AM  Consent given by: patient  Site marked: site marked  Procedure Details  Location: knee - L knee  Medications administered: 2 mL lidocaine PF 2% 2 %; 80 mg methylPREDNISolone acetate 80 MG/ML  Patient tolerance: patient tolerated the procedure well with no immediate complications

## 2018-11-12 DIAGNOSIS — E83.119 HEMOCHROMATOSIS, UNSPECIFIED HEMOCHROMATOSIS TYPE: Primary | ICD-10-CM

## 2018-11-13 ENCOUNTER — OFFICE VISIT (OUTPATIENT)
Dept: ONCOLOGY | Facility: CLINIC | Age: 68
End: 2018-11-13

## 2018-11-13 ENCOUNTER — LAB (OUTPATIENT)
Dept: LAB | Facility: HOSPITAL | Age: 68
End: 2018-11-13

## 2018-11-13 VITALS
OXYGEN SATURATION: 98 % | BODY MASS INDEX: 33.11 KG/M2 | TEMPERATURE: 98.1 F | HEIGHT: 66 IN | HEART RATE: 88 BPM | RESPIRATION RATE: 16 BRPM | WEIGHT: 206 LBS | DIASTOLIC BLOOD PRESSURE: 70 MMHG | SYSTOLIC BLOOD PRESSURE: 130 MMHG

## 2018-11-13 DIAGNOSIS — E83.119 HEMOCHROMATOSIS, UNSPECIFIED HEMOCHROMATOSIS TYPE: ICD-10-CM

## 2018-11-13 DIAGNOSIS — E83.119 HEMOCHROMATOSIS, UNSPECIFIED HEMOCHROMATOSIS TYPE: Primary | ICD-10-CM

## 2018-11-13 LAB
BASOPHILS # BLD AUTO: 0.1 10*3/MM3 (ref 0–0.1)
BASOPHILS NFR BLD AUTO: 1.2 % (ref 0–1.1)
DEPRECATED RDW RBC AUTO: 47.2 FL (ref 37–49)
EOSINOPHIL # BLD AUTO: 0.41 10*3/MM3 (ref 0–0.36)
EOSINOPHIL NFR BLD AUTO: 4.9 % (ref 1–5)
ERYTHROCYTE [DISTWIDTH] IN BLOOD BY AUTOMATED COUNT: 12.8 % (ref 11.7–14.5)
FERRITIN SERPL-MCNC: 92.5 NG/ML (ref 30–400)
HCT VFR BLD AUTO: 42.3 % (ref 40–49)
HGB BLD-MCNC: 14.6 G/DL (ref 13.5–16.5)
IMM GRANULOCYTES # BLD: 0.04 10*3/MM3 (ref 0–0.03)
IMM GRANULOCYTES NFR BLD: 0.5 % (ref 0–0.5)
LYMPHOCYTES # BLD AUTO: 1.82 10*3/MM3 (ref 1–3.5)
LYMPHOCYTES NFR BLD AUTO: 22 % (ref 20–49)
MCH RBC QN AUTO: 34.7 PG (ref 27–33)
MCHC RBC AUTO-ENTMCNC: 34.5 G/DL (ref 32–35)
MCV RBC AUTO: 100.5 FL (ref 83–97)
MONOCYTES # BLD AUTO: 0.84 10*3/MM3 (ref 0.25–0.8)
MONOCYTES NFR BLD AUTO: 10.1 % (ref 4–12)
NEUTROPHILS # BLD AUTO: 5.08 10*3/MM3 (ref 1.5–7)
NEUTROPHILS NFR BLD AUTO: 61.3 % (ref 39–75)
NRBC BLD MANUAL-RTO: 0 /100 WBC (ref 0–0)
PLATELET # BLD AUTO: 219 10*3/MM3 (ref 150–375)
PMV BLD AUTO: 9.7 FL (ref 8.9–12.1)
RBC # BLD AUTO: 4.21 10*6/MM3 (ref 4.3–5.5)
WBC NRBC COR # BLD: 8.29 10*3/MM3 (ref 4–10)

## 2018-11-13 PROCEDURE — 82728 ASSAY OF FERRITIN: CPT | Performed by: INTERNAL MEDICINE

## 2018-11-13 PROCEDURE — 36415 COLL VENOUS BLD VENIPUNCTURE: CPT | Performed by: INTERNAL MEDICINE

## 2018-11-13 PROCEDURE — 85025 COMPLETE CBC W/AUTO DIFF WBC: CPT | Performed by: INTERNAL MEDICINE

## 2018-11-13 PROCEDURE — 99213 OFFICE O/P EST LOW 20 MIN: CPT | Performed by: INTERNAL MEDICINE

## 2018-11-19 NOTE — PROGRESS NOTES
REASONS FOR FOLLOWUP:    hemochromatosis    HISTORY OF PRESENT ILLNESS:  The patient is a 68 y.o. year old male  who is here for follow-up with the above-mentioned history.  He has undergone phlebotomy of 500 cc every other month in an effort to reduce his ferritin I have requested a more stringent regimen briefly, every other month x 3 then every 3 month approach to phlebo's.    Past Medical History:   Diagnosis Date   • Atrial fibrillation (CMS/HCC)    • Atrial flutter (CMS/HCC)    • Diabetes (CMS/HCC)    • Gout    • Heart disease     Status post pacemaker implantation   • Hemochromatosis    • History of syncope    • Hyperlipidemia    • Malignant neoplasm of prostate (CMS/HCC) 2018   • RBBB (right bundle branch block)    • Sleep apnea    • SSS (sick sinus syndrome) (CMS/HCC)    • SVT (supraventricular tachycardia) (CMS/HCC)      Past Surgical History:   Procedure Laterality Date   • CARDIAC ABLATION  2010    Paroxysmal atrial fib/Dr. Koo   • CARDIAC ABLATION  2012    Recurrent atrial flutter/Dr. Koo   • CARDIAC CATHETERIZATION     • CARDIAC PACEMAKER PLACEMENT  2014    Medtronic dual chamber/Dr. Manriquez   • KNEE SURGERY Right    • PACEMAKER IMPLANTATION  05/2006       HEMATOLOGIC/ONCOLOGIC HISTORY:  (History from previous dates can be found in the separate document.)    MEDICATIONS    Current Outpatient Medications:   •  allopurinol (ZYLOPRIM) 300 MG tablet, Take 300 mg by mouth Daily., Disp: , Rfl:   •  aspirin 81 MG EC tablet, Take 81 mg by mouth daily., Disp: , Rfl:   •  atorvastatin (LIPITOR) 40 MG tablet, Take 40 mg by mouth Daily., Disp: , Rfl:   •  ibuprofen (ADVIL,MOTRIN) 800 MG tablet, Take 800 mg by mouth Every 6 (Six) Hours As Needed., Disp: , Rfl:   •  lisinopril (PRINIVIL,ZESTRIL) 10 MG tablet, Take 10 mg by mouth Daily., Disp: , Rfl:   •  metFORMIN (GLUCOPHAGE) 500 MG tablet, Take 500 mg by mouth Daily With Breakfast., Disp: , Rfl:   •  metoprolol succinate XL (TOPROL-XL) 25 MG 24 hr tablet, Take  "25 mg by mouth Daily., Disp: , Rfl:   •  Multiple Vitamins-Minerals (MULTIVITAMIN ADULT PO), Take  by mouth Daily., Disp: , Rfl:     ALLERGIES:   No Known Allergies    SOCIAL HISTORY:       Social History     Socioeconomic History   • Marital status:      Spouse name: Suzie   • Number of children: Not on file   • Years of education: High School   • Highest education level: Not on file   Social Needs   • Financial resource strain: Not on file   • Food insecurity - worry: Not on file   • Food insecurity - inability: Not on file   • Transportation needs - medical: Not on file   • Transportation needs - non-medical: Not on file   Occupational History     Employer: RETIRED   Tobacco Use   • Smoking status: Former Smoker     Types: Cigars   • Smokeless tobacco: Never Used   Substance and Sexual Activity   • Alcohol use: Yes     Comment: Moderate \"few beers daily\"   • Drug use: No   • Sexual activity: Defer   Other Topics Concern   • Not on file   Social History Narrative   • Not on file         FAMILY HISTORY:  Family History   Problem Relation Age of Onset   • Cancer Other         LYMPH & BREAST   • Diabetes Other    • Hypertension Other    • Heart disease Other    • Breast cancer Mother    • Lymphoma Father    • Hemochromatosis Father        REVIEW OF SYSTEMS:  GENERAL: No change in appetite or weight;   No fevers, chills, sweats.    SKIN: No nonhealing lesions.   No rashes.  HEME/LYMPH: No easy bruising, bleeding.   No swollen nodes.   EYES: No vision changes or diplopia.   ENT: No tinnitus, hearing loss, gum bleeding, epistaxis, hoarseness or dysphagia.   RESPIRATORY: No cough, shortness of breath, hemoptysis or wheezing.   CVS: No chest pain, palpitations, orthopnea, dyspnea on exertion or PND.   GI: No melena or hematochezia.   No abdominal pain.  No nausea, vomiting, constipation, diarrhea  : No lower tract obstructive symptoms, dysuria or hematuria.   MUSCULOSKELETAL: No bone pain.  No joint stiffness. " "  NEUROLOGICAL: No global weakness, loss of consciousness or seizures.   PSYCHIATRIC: No increased nervousness, mood changes or depression.     Objective    Vitals:    11/13/18 1205   BP: 130/70   Pulse: 88   Resp: 16   Temp: 98.1 °F (36.7 °C)   SpO2: 98%  Comment: at rest   Weight: 93.4 kg (206 lb)   Height: 167 cm (65.75\")  Comment: new ht.   PainSc:   8   PainLoc: Back     Current Status 11/13/2018   ECOG score 0      PHYSICAL EXAM:    GENERAL:  Well-developed, well-nourished in no acute distress.   SKIN:  Warm, dry without rashes, purpura or petechiae.  EYES:  Pupils equal, round and reactive to light.  EOMs intact.  Conjunctivae normal.  EARS:  Hearing intact.  NOSE:  Septum midline.  No excoriations or nasal discharge.  MOUTH:  Tongue is well-papillated; no stomatitis or ulcers.  Lips normal.  THROAT:  Oropharynx without lesions or exudates.  NECK:  Supple with good range of motion; no thyromegaly or masses, no JVD.  LYMPHATICS:  No cervical, supraclavicular, axillary or inguinal adenopathy.  CHEST:  Lungs clear to auscultation. Good airflow.  CARDIAC:  Regular rate and rhythm without murmurs, rubs or gallops. Normal S1,S2.  ABDOMEN:  Soft, nontender with no hepatosplenomegaly or masses.  EXTREMITIES:  No clubbing, cyanosis or edema.  NEUROLOGICAL:  Cranial Nerves II-XII grossly intact.  No focal neurological deficits.  PSYCHIATRIC:  Normal affect and mood.     RECENT LABS:        WBC   Date/Time Value Ref Range Status   11/13/2018 11:53 AM 8.29 4.00 - 10.00 10*3/mm3 Final     Hemoglobin   Date/Time Value Ref Range Status   11/13/2018 11:53 AM 14.6 13.5 - 16.5 g/dL Final     Platelets   Date/Time Value Ref Range Status   11/13/2018 11:53  150 - 375 10*3/mm3 Final       1/25/2018   Iron 59 - 158 mcg/dL 107   TIBC 249 - 505 mcg/dL 417   Ferritin 30.00 - 400.00 ng/mL 29.80 (A)   Iron Saturation 14 - 48 % 26       Assessment/Plan      Hereditary hemochromatosis:Last phlebo was 10/2017 and with ferritin of 30 " today, no need today. We will plan q 6 months surveillance.

## 2019-02-05 ENCOUNTER — TELEPHONE (OUTPATIENT)
Dept: GENERAL RADIOLOGY | Facility: HOSPITAL | Age: 69
End: 2019-02-05

## 2019-02-05 ENCOUNTER — INFUSION (OUTPATIENT)
Dept: ONCOLOGY | Facility: HOSPITAL | Age: 69
End: 2019-02-05

## 2019-02-05 ENCOUNTER — LAB (OUTPATIENT)
Dept: LAB | Facility: HOSPITAL | Age: 69
End: 2019-02-05

## 2019-02-05 VITALS
SYSTOLIC BLOOD PRESSURE: 147 MMHG | BODY MASS INDEX: 34.29 KG/M2 | DIASTOLIC BLOOD PRESSURE: 89 MMHG | WEIGHT: 210.8 LBS | HEART RATE: 94 BPM

## 2019-02-05 DIAGNOSIS — E83.119 HEMOCHROMATOSIS, UNSPECIFIED HEMOCHROMATOSIS TYPE: ICD-10-CM

## 2019-02-05 LAB
BASOPHILS # BLD AUTO: 0.07 10*3/MM3 (ref 0–0.1)
BASOPHILS NFR BLD AUTO: 0.9 % (ref 0–1.1)
DEPRECATED RDW RBC AUTO: 45.1 FL (ref 37–49)
EOSINOPHIL # BLD AUTO: 0.43 10*3/MM3 (ref 0–0.36)
EOSINOPHIL NFR BLD AUTO: 5.2 % (ref 1–5)
ERYTHROCYTE [DISTWIDTH] IN BLOOD BY AUTOMATED COUNT: 12.2 % (ref 11.7–14.5)
FERRITIN SERPL-MCNC: 91.3 NG/ML (ref 30–400)
HCT VFR BLD AUTO: 39.5 % (ref 40–49)
HGB BLD-MCNC: 13.6 G/DL (ref 13.5–16.5)
IMM GRANULOCYTES # BLD AUTO: 0.01 10*3/MM3 (ref 0–0.03)
IMM GRANULOCYTES NFR BLD AUTO: 0.1 % (ref 0–0.5)
IRON 24H UR-MRATE: 94 MCG/DL (ref 59–158)
IRON SATN MFR SERPL: 28 % (ref 14–48)
LYMPHOCYTES # BLD AUTO: 1.69 10*3/MM3 (ref 1–3.5)
LYMPHOCYTES NFR BLD AUTO: 20.6 % (ref 20–49)
MCH RBC QN AUTO: 34.5 PG (ref 27–33)
MCHC RBC AUTO-ENTMCNC: 34.4 G/DL (ref 32–35)
MCV RBC AUTO: 100.3 FL (ref 83–97)
MONOCYTES # BLD AUTO: 0.75 10*3/MM3 (ref 0.25–0.8)
MONOCYTES NFR BLD AUTO: 9.1 % (ref 4–12)
NEUTROPHILS # BLD AUTO: 5.26 10*3/MM3 (ref 1.5–7)
NEUTROPHILS NFR BLD AUTO: 64.1 % (ref 39–75)
NRBC BLD AUTO-RTO: 0 /100 WBC (ref 0–0)
PLATELET # BLD AUTO: 187 10*3/MM3 (ref 150–375)
PMV BLD AUTO: 9.3 FL (ref 8.9–12.1)
RBC # BLD AUTO: 3.94 10*6/MM3 (ref 4.3–5.5)
TIBC SERPL-MCNC: 336 MCG/DL (ref 249–505)
TRANSFERRIN SERPL-MCNC: 240 MG/DL (ref 200–360)
WBC NRBC COR # BLD: 8.21 10*3/MM3 (ref 4–10)

## 2019-02-05 PROCEDURE — 82728 ASSAY OF FERRITIN: CPT | Performed by: INTERNAL MEDICINE

## 2019-02-05 PROCEDURE — 36415 COLL VENOUS BLD VENIPUNCTURE: CPT | Performed by: INTERNAL MEDICINE

## 2019-02-05 PROCEDURE — 83540 ASSAY OF IRON: CPT | Performed by: INTERNAL MEDICINE

## 2019-02-05 PROCEDURE — 84466 ASSAY OF TRANSFERRIN: CPT | Performed by: INTERNAL MEDICINE

## 2019-02-05 PROCEDURE — 85025 COMPLETE CBC W/AUTO DIFF WBC: CPT | Performed by: INTERNAL MEDICINE

## 2019-02-05 NOTE — TELEPHONE ENCOUNTER
----- Message from Hanh Mcbride RN sent at 2/5/2019  2:37 PM EST -----  Patient didn't want to wait today for labs so need to come back tomorrow for phelbo. Wants to come

## 2019-02-05 NOTE — PROGRESS NOTES
Patient did not want to wait for lab results. Ferritin 91.3 so needs phelbo. Called him and he wants to come back tomorrow. In basket message to scheduling and instructed him scheduling would call him. Verbalized understanding.

## 2019-02-06 ENCOUNTER — INFUSION (OUTPATIENT)
Dept: ONCOLOGY | Facility: HOSPITAL | Age: 69
End: 2019-02-06

## 2019-02-06 VITALS
OXYGEN SATURATION: 98 % | BODY MASS INDEX: 34.16 KG/M2 | SYSTOLIC BLOOD PRESSURE: 165 MMHG | DIASTOLIC BLOOD PRESSURE: 85 MMHG | HEART RATE: 97 BPM | WEIGHT: 210 LBS | TEMPERATURE: 98.2 F | RESPIRATION RATE: 16 BRPM

## 2019-02-06 DIAGNOSIS — E83.110 HEREDITARY HEMOCHROMATOSIS (HCC): Primary | ICD-10-CM

## 2019-02-06 PROCEDURE — 99195 PHLEBOTOMY: CPT | Performed by: INTERNAL MEDICINE

## 2019-02-06 RX ORDER — SODIUM CHLORIDE 9 MG/ML
250 INJECTION, SOLUTION INTRAVENOUS ONCE
Status: CANCELLED | OUTPATIENT
Start: 2019-02-06

## 2019-02-06 RX ORDER — SODIUM CHLORIDE 9 MG/ML
250 INJECTION, SOLUTION INTRAVENOUS ONCE
Status: DISCONTINUED | OUTPATIENT
Start: 2019-02-06 | End: 2019-02-06 | Stop reason: HOSPADM

## 2019-02-08 ENCOUNTER — CLINICAL SUPPORT (OUTPATIENT)
Dept: ORTHOPEDIC SURGERY | Facility: CLINIC | Age: 69
End: 2019-02-08

## 2019-02-08 VITALS — BODY MASS INDEX: 34.99 KG/M2 | HEIGHT: 65 IN | WEIGHT: 210 LBS | TEMPERATURE: 98.5 F

## 2019-02-08 DIAGNOSIS — M25.562 CHRONIC PAIN OF LEFT KNEE: Primary | ICD-10-CM

## 2019-02-08 DIAGNOSIS — G89.29 CHRONIC PAIN OF LEFT KNEE: Primary | ICD-10-CM

## 2019-02-08 DIAGNOSIS — M17.12 PRIMARY OSTEOARTHRITIS OF LEFT KNEE: ICD-10-CM

## 2019-02-08 PROCEDURE — 20610 DRAIN/INJ JOINT/BURSA W/O US: CPT | Performed by: NURSE PRACTITIONER

## 2019-02-08 PROCEDURE — 73562 X-RAY EXAM OF KNEE 3: CPT | Performed by: NURSE PRACTITIONER

## 2019-02-08 PROCEDURE — 99212 OFFICE O/P EST SF 10 MIN: CPT | Performed by: NURSE PRACTITIONER

## 2019-02-08 RX ORDER — LIDOCAINE HYDROCHLORIDE 10 MG/ML
4 INJECTION, SOLUTION EPIDURAL; INFILTRATION; INTRACAUDAL; PERINEURAL
Status: COMPLETED | OUTPATIENT
Start: 2019-02-08 | End: 2019-02-08

## 2019-02-08 RX ORDER — RIVAROXABAN 20 MG/1
TABLET, FILM COATED ORAL
COMMUNITY
Start: 2019-02-01 | End: 2020-01-17

## 2019-02-08 RX ORDER — METHYLPREDNISOLONE ACETATE 80 MG/ML
80 INJECTION, SUSPENSION INTRA-ARTICULAR; INTRALESIONAL; INTRAMUSCULAR; SOFT TISSUE
Status: COMPLETED | OUTPATIENT
Start: 2019-02-08 | End: 2019-02-08

## 2019-02-08 RX ADMIN — LIDOCAINE HYDROCHLORIDE 4 ML: 10 INJECTION, SOLUTION EPIDURAL; INFILTRATION; INTRACAUDAL; PERINEURAL at 10:07

## 2019-02-08 RX ADMIN — METHYLPREDNISOLONE ACETATE 80 MG: 80 INJECTION, SUSPENSION INTRA-ARTICULAR; INTRALESIONAL; INTRAMUSCULAR; SOFT TISSUE at 10:07

## 2019-02-08 NOTE — PROGRESS NOTES
Patient: Loyd Avalos  YOB: 1950    Chief Complaints:  left knee pain    Subjective:    History of Present Illness: Here today for knee pain. The right knee is also starting to bother him from time to time.  Has not been doing daily thigh exercise and delayed to 5 month in between cortisone this time and is flared on the left. The pain is a generalized joint tenderness.  It has been progressive in nature but remains intermittent.  Worsened by prolonged standing or walking and squatting activities. Has had improvement in the past with ice/heat, rest, and injections.     This problem is not new to this examiner.     Allergies: No Known Allergies    Medications:   Home Medications:  Current Outpatient Medications on File Prior to Visit   Medication Sig   • allopurinol (ZYLOPRIM) 300 MG tablet Take 300 mg by mouth Daily.   • aspirin 81 MG EC tablet Take 81 mg by mouth daily.   • atorvastatin (LIPITOR) 40 MG tablet Take 40 mg by mouth Daily.   • ibuprofen (ADVIL,MOTRIN) 800 MG tablet Take 800 mg by mouth Every 6 (Six) Hours As Needed.   • lisinopril (PRINIVIL,ZESTRIL) 10 MG tablet Take 10 mg by mouth Daily.   • metFORMIN (GLUCOPHAGE) 500 MG tablet Take 500 mg by mouth Daily With Breakfast.   • metoprolol succinate XL (TOPROL-XL) 25 MG 24 hr tablet Take 25 mg by mouth Daily.   • Multiple Vitamins-Minerals (MULTIVITAMIN ADULT PO) Take  by mouth Daily.   • XARELTO 20 MG tablet      No current facility-administered medications on file prior to visit.      Current Medications:  Scheduled Meds:  Continuous Infusions:  No current facility-administered medications for this visit.   PRN Meds:.    I have reviewed the patient's medical history in detail and updated the computerized patient record.  Review and summarization of old records include:    Past Medical History:   Diagnosis Date   • Atrial fibrillation (CMS/HCC)    • Atrial flutter (CMS/HCC)    • Diabetes (CMS/HCC)    • Gout    • Heart disease      "Status post pacemaker implantation   • Hemochromatosis    • History of syncope    • Hyperlipidemia    • Malignant neoplasm of prostate (CMS/HCC) 2018   • RBBB (right bundle branch block)    • Sleep apnea    • SSS (sick sinus syndrome) (CMS/HCC)    • SVT (supraventricular tachycardia) (CMS/HCC)         Past Surgical History:   Procedure Laterality Date   • CARDIAC ABLATION  2010    Paroxysmal atrial fib/Dr. Koo   • CARDIAC ABLATION  2012    Recurrent atrial flutter/Dr. Koo   • CARDIAC CATHETERIZATION     • CARDIAC PACEMAKER PLACEMENT  2014    Medtronic dual chamber/Dr. Manriquez   • KNEE SURGERY Right    • PACEMAKER IMPLANTATION  05/2006        Social History     Occupational History     Employer: RETIRED   Tobacco Use   • Smoking status: Former Smoker     Types: Cigars   • Smokeless tobacco: Never Used   Substance and Sexual Activity   • Alcohol use: Yes     Comment: Moderate \"few beers daily\"   • Drug use: No   • Sexual activity: Defer      Social History     Social History Narrative   • Not on file        Family History   Problem Relation Age of Onset   • Cancer Other         LYMPH & BREAST   • Diabetes Other    • Hypertension Other    • Heart disease Other    • Breast cancer Mother    • Lymphoma Father    • Hemochromatosis Father        ROS: 14 point review of systems was performed and was negative except for documented findings in HPI and today's encounter.     Allergies: No Known Allergies  Constitutional:  Denies fever, shaking or chills   Eyes:  Denies change in visual acuity   HENT:  Denies nasal congestion or sore throat   Respiratory:  Denies cough or shortness of breath   Cardiovascular:  Denies chest pain or severe LE edema   GI:  Denies abdominal pain, nausea, vomiting, bloody stools or diarrhea   Musculoskeletal:  Numbness, tingling, or loss of motor function only as noted above in history of present illness.  : Denies painful urination or hematuria  Integument:  Denies rash, lesion or ulceration " "  Neurologic:  Denies headache or focal weakness  Endocrine:  Denies lymphadenopathy  Psych:  Denies confusion or change in mental status   Hem:  Denies active bleeding    Physical Exam:  Wt Readings from Last 3 Encounters:   02/08/19 95.3 kg (210 lb)   02/06/19 95.3 kg (210 lb)   02/05/19 95.6 kg (210 lb 12.8 oz)     Ht Readings from Last 3 Encounters:   02/08/19 165.1 cm (65\")   11/13/18 167 cm (65.75\")   09/17/18 170.2 cm (67\")     Body mass index is 34.95 kg/m².  Facility age limit for growth percentiles is 20 years.  Vitals:    02/08/19 1005   Temp: 98.5 °F (36.9 °C)     Vital Signs:  reviewed  Constitutional: Awake alert and oriented x3, well developed, no acute distress, non-toxic appearance.  EYES: symmetric, sclera clear  ENT:  Normocephalic, Atraumatic.   Respiratory:  No respiratory distress, No wheezing  CV: pulse regular, no palpitations or pallor.  GI:  Abdomen soft, non-tender.   Vascular:  Intact distal pulses, No cyanosis, no signs or symptoms of DVT.  Neurologic: Sensation grossly intact to the involved extremity, No focal deficits noted.   Neck: No tenderness, Supple.  Integument: warm, dry, no ulcerations.   Psychiatric:  Oriented, no pathological affect.  Musculoskeletal:    Affected knee(s):  Painful gait with a subtle limp, positive for synovitis, swelling, joint effusion with crepitation.  Lachman negative  Posterior drawer negative  Tereza's negative  Patellofemoral grind +  Sensation grossly intact to light touch throughout the lower extremity  Skin is intact  Distal pulses are palpable  No signs or symptoms of DVT        Diagnostic Data:     Imaging was done today, images were personally viewed and discussed with the patient:    Indication: pain related symptoms,  Views: 3V AP, LAT & 40 degree PA left knee(s)   Findings: severe end-stage arthritis (bone on bone, subchondral sclerosis/cysts, osteophytes)  Comparison views: viewed last xray done in the office.     Procedure:  Large Joint " Arthrocentesis: L knee  Date/Time: 2/8/2019 10:07 AM  Consent given by: patient  Site marked: site marked  Timeout: Immediately prior to procedure a time out was called to verify the correct patient, procedure, equipment, support staff and site/side marked as required   Supporting Documentation  Indications: pain and joint swelling   Procedure Details  Location: knee - L knee  Preparation: Patient was prepped and draped in the usual sterile fashion  Needle size: 22 G  Approach: anterolateral  Medications administered: 80 mg methylPREDNISolone acetate 80 MG/ML; 4 mL lidocaine PF 1% 1 %  Patient tolerance: patient tolerated the procedure well with no immediate complications          Assessment:     ICD-10-CM ICD-9-CM   1. Chronic pain of left knee M25.562 719.46    G89.29 338.29   2. Primary osteoarthritis of left knee M17.12 715.16           Plan: Is to proceed with injection  Follow up as indicated.  Ice, elevate, and rest as needed.  Additional interventions include:  15 min spent face to face with patient 11 min spent counseling about natural history and expected course of assessed complaint and reviewed treatment options that have been tried and not tried and those currently available. Questions answered.    Natural history and expected course of this patient's diagnosis discussed along with evaluation of therapies. Questions answered.  Advice on benefits of, and types of regular/moderate exercise including biomechanical forces involved as it pertains to this complaint, with a goal of 5 min at least 7 times a week.    Cortisone Injection. See procedure note.  Cryotherapy/brachy therapy as indicated with instructions.   Advised on strengthening exercises, stressed importance of these with progression of arthritis, demonstrated exercises to patient with repeat demonstration and verb of understanding.   Brochure.  Can make a f/u appt for the right knee for inj if he needs it, he will call, xrays show arthritis in  the right knee as well.  Is borderline diabetic so one cortisone inj at a time is best. Verb understanding.   2/8/2019  JAX

## 2019-02-11 ENCOUNTER — TELEPHONE (OUTPATIENT)
Dept: GENERAL RADIOLOGY | Facility: HOSPITAL | Age: 69
End: 2019-02-11

## 2019-02-11 NOTE — TELEPHONE ENCOUNTER
----- Message from Dotty Walton RN sent at 2/11/2019  7:54 AM EST -----  Please schedule pt as indicated. Call pt w/ appt info. Extension [phone] w/ any questions. Thanks.    ----- Message -----  From: Aldo Aquino MD  Sent: 2/11/2019   7:25 AM  To: Dotty Walton RN    Every 3 months is fine..MD in 6 mo.    ----- Message -----  From: Dotty Walton RN  Sent: 2/6/2019   9:51 AM  To: MD Dr. Miles Walsh,    Pt returned for phlebotomy today and asked about his next appt. I didn't see any note from his visit yesterday and wanted to clarify whether he is to return next month or be changed to every 3 months as well as when you'd like to see him next. Please advise. Thanks.

## 2019-05-10 ENCOUNTER — LAB (OUTPATIENT)
Dept: LAB | Facility: HOSPITAL | Age: 69
End: 2019-05-10

## 2019-05-10 ENCOUNTER — INFUSION (OUTPATIENT)
Dept: ONCOLOGY | Facility: HOSPITAL | Age: 69
End: 2019-05-10

## 2019-05-10 VITALS — BODY MASS INDEX: 33.65 KG/M2 | WEIGHT: 202.2 LBS

## 2019-05-10 DIAGNOSIS — E83.110 HEREDITARY HEMOCHROMATOSIS (HCC): Primary | ICD-10-CM

## 2019-05-10 DIAGNOSIS — E83.110 HEREDITARY HEMOCHROMATOSIS (HCC): ICD-10-CM

## 2019-05-10 DIAGNOSIS — E83.119 HEMOCHROMATOSIS, UNSPECIFIED HEMOCHROMATOSIS TYPE: ICD-10-CM

## 2019-05-10 LAB
BASOPHILS # BLD AUTO: 0.07 10*3/MM3 (ref 0–0.2)
BASOPHILS NFR BLD AUTO: 0.9 % (ref 0–1.5)
DEPRECATED RDW RBC AUTO: 44.5 FL (ref 37–54)
EOSINOPHIL # BLD AUTO: 0.56 10*3/MM3 (ref 0–0.4)
EOSINOPHIL NFR BLD AUTO: 7.3 % (ref 0.3–6.2)
ERYTHROCYTE [DISTWIDTH] IN BLOOD BY AUTOMATED COUNT: 12.6 % (ref 12.3–15.4)
FERRITIN SERPL-MCNC: 99.1 NG/ML (ref 30–400)
HCT VFR BLD AUTO: 40.6 % (ref 37.5–51)
HGB BLD-MCNC: 14.4 G/DL (ref 13–17.7)
IMM GRANULOCYTES # BLD AUTO: 0.03 10*3/MM3 (ref 0–0.05)
IMM GRANULOCYTES NFR BLD AUTO: 0.4 % (ref 0–0.5)
LYMPHOCYTES # BLD AUTO: 2.36 10*3/MM3 (ref 0.7–3.1)
LYMPHOCYTES NFR BLD AUTO: 30.7 % (ref 19.6–45.3)
MCH RBC QN AUTO: 34.4 PG (ref 26.6–33)
MCHC RBC AUTO-ENTMCNC: 35.5 G/DL (ref 31.5–35.7)
MCV RBC AUTO: 96.9 FL (ref 79–97)
MONOCYTES # BLD AUTO: 0.82 10*3/MM3 (ref 0.1–0.9)
MONOCYTES NFR BLD AUTO: 10.7 % (ref 5–12)
NEUTROPHILS # BLD AUTO: 3.84 10*3/MM3 (ref 1.7–7)
NEUTROPHILS NFR BLD AUTO: 50 % (ref 42.7–76)
NRBC BLD AUTO-RTO: 0.4 /100 WBC (ref 0–0.2)
PLATELET # BLD AUTO: 165 10*3/MM3 (ref 140–450)
PMV BLD AUTO: 10.3 FL (ref 6–12)
RBC # BLD AUTO: 4.19 10*6/MM3 (ref 4.14–5.8)
WBC NRBC COR # BLD: 7.68 10*3/MM3 (ref 3.4–10.8)

## 2019-05-10 PROCEDURE — 82728 ASSAY OF FERRITIN: CPT | Performed by: INTERNAL MEDICINE

## 2019-05-10 PROCEDURE — 85025 COMPLETE CBC W/AUTO DIFF WBC: CPT | Performed by: INTERNAL MEDICINE

## 2019-05-10 PROCEDURE — 36415 COLL VENOUS BLD VENIPUNCTURE: CPT | Performed by: INTERNAL MEDICINE

## 2019-05-10 NOTE — PROGRESS NOTES
PT SCHEDULED FOR PHLEBO TODAY. PT NEEDED FERRITIN DRAWN 1ST. PT DIDN'T WANT TO WAIT FOR RESULTS. PT SENT BACK TO LAB TO HAVE FERRITIN DRAWN AND HE WILL CALL HERE Monday FOR RESULTS. PT WILL MAKE APPT FOR PHLEBO IF NEEDED.

## 2019-05-13 ENCOUNTER — TELEPHONE (OUTPATIENT)
Dept: ONCOLOGY | Facility: CLINIC | Age: 69
End: 2019-05-13

## 2019-05-13 ENCOUNTER — TELEPHONE (OUTPATIENT)
Dept: ONCOLOGY | Facility: HOSPITAL | Age: 69
End: 2019-05-13

## 2019-05-13 NOTE — TELEPHONE ENCOUNTER
Needs phlebo for ferritin of 99    ----- Message from Nessa Chaney sent at 5/13/2019 12:04 PM EDT -----  [phone]  Calling about  A ferritin test results.

## 2019-05-13 NOTE — TELEPHONE ENCOUNTER
----- Message from Salina Mccoy RN sent at 5/13/2019 12:15 PM EDT -----  Pt will need phlebo.  No labs.  Wants to come in Wednesday.

## 2019-05-15 ENCOUNTER — INFUSION (OUTPATIENT)
Dept: ONCOLOGY | Facility: HOSPITAL | Age: 69
End: 2019-05-15

## 2019-05-15 VITALS
TEMPERATURE: 98.2 F | WEIGHT: 201.8 LBS | BODY MASS INDEX: 33.58 KG/M2 | HEART RATE: 85 BPM | SYSTOLIC BLOOD PRESSURE: 125 MMHG | DIASTOLIC BLOOD PRESSURE: 77 MMHG

## 2019-05-15 DIAGNOSIS — E83.110 HEREDITARY HEMOCHROMATOSIS (HCC): Primary | ICD-10-CM

## 2019-05-15 PROCEDURE — 99195 PHLEBOTOMY: CPT | Performed by: INTERNAL MEDICINE

## 2019-05-15 RX ORDER — SODIUM CHLORIDE 9 MG/ML
250 INJECTION, SOLUTION INTRAVENOUS ONCE
Status: CANCELLED | OUTPATIENT
Start: 2019-05-15

## 2019-06-10 ENCOUNTER — CLINICAL SUPPORT (OUTPATIENT)
Dept: ORTHOPEDIC SURGERY | Facility: CLINIC | Age: 69
End: 2019-06-10

## 2019-06-10 VITALS — HEIGHT: 67 IN | WEIGHT: 205 LBS | BODY MASS INDEX: 32.18 KG/M2 | TEMPERATURE: 97.3 F

## 2019-06-10 DIAGNOSIS — M17.12 ARTHRITIS OF LEFT KNEE: Primary | ICD-10-CM

## 2019-06-10 PROCEDURE — 20610 DRAIN/INJ JOINT/BURSA W/O US: CPT | Performed by: NURSE PRACTITIONER

## 2019-06-10 PROCEDURE — 99212 OFFICE O/P EST SF 10 MIN: CPT | Performed by: NURSE PRACTITIONER

## 2019-06-10 RX ORDER — METHYLPREDNISOLONE ACETATE 80 MG/ML
80 INJECTION, SUSPENSION INTRA-ARTICULAR; INTRALESIONAL; INTRAMUSCULAR; SOFT TISSUE
Status: COMPLETED | OUTPATIENT
Start: 2019-06-10 | End: 2019-06-10

## 2019-06-10 RX ADMIN — METHYLPREDNISOLONE ACETATE 80 MG: 80 INJECTION, SUSPENSION INTRA-ARTICULAR; INTRALESIONAL; INTRAMUSCULAR; SOFT TISSUE at 10:24

## 2019-06-10 NOTE — PROGRESS NOTES
Patient: Loyd Avalos  YOB: 1950    Chief Complaints:  bilateral knee pain    Subjective:    History of Present Illness: Here today for knee pain. He has done 4 month interval and seemed to do well with it but has been taking 800 ibuprofen bid for internmittent flares and he is onblood thinner!  The right knee flared up a couple of weeks ago but is doing better today. The left knee is worse. The pain is a generalized joint tenderness.  It has been progressive in nature but remains intermittent.  Worsened by prolonged standing or walking and squatting activities. Has had improvement in the past with ice/heat, rest, and injections.     This problem is not new to this examiner.     Allergies: No Known Allergies    Medications:   Home Medications:  Current Outpatient Medications on File Prior to Visit   Medication Sig   • allopurinol (ZYLOPRIM) 300 MG tablet Take 300 mg by mouth Daily.   • aspirin 81 MG EC tablet Take 81 mg by mouth daily.   • atorvastatin (LIPITOR) 40 MG tablet Take 40 mg by mouth Daily.   • ibuprofen (ADVIL,MOTRIN) 800 MG tablet Take 800 mg by mouth Every 6 (Six) Hours As Needed.   • lisinopril (PRINIVIL,ZESTRIL) 10 MG tablet Take 10 mg by mouth Daily.   • metFORMIN (GLUCOPHAGE) 500 MG tablet Take 500 mg by mouth Daily With Breakfast.   • metoprolol succinate XL (TOPROL-XL) 25 MG 24 hr tablet Take 25 mg by mouth Daily.   • Multiple Vitamins-Minerals (MULTIVITAMIN ADULT PO) Take  by mouth Daily.   • XARELTO 20 MG tablet      No current facility-administered medications on file prior to visit.      Current Medications:  Scheduled Meds:  Continuous Infusions:  No current facility-administered medications for this visit.   PRN Meds:.    I have reviewed the patient's medical history in detail and updated the computerized patient record.  Review and summarization of old records include:    Past Medical History:   Diagnosis Date   • Atrial fibrillation (CMS/HCC)    • Atrial flutter  "(CMS/[facility])    • Diabetes (CMS/[facility])    • Gout    • Heart disease     Status post pacemaker implantation   • Hemochromatosis    • History of syncope    • Hyperlipidemia    • Malignant neoplasm of prostate (CMS/[facility]) 2018   • RBBB (right bundle branch block)    • Sleep apnea    • SSS (sick sinus syndrome) (CMS/[facility])    • SVT (supraventricular tachycardia) (CMS/[facility])         Past Surgical History:   Procedure Laterality Date   • CARDIAC ABLATION  2010    Paroxysmal atrial fib/Dr. Koo   • CARDIAC ABLATION  2012    Recurrent atrial flutter/Dr. Koo   • CARDIAC CATHETERIZATION     • CARDIAC PACEMAKER PLACEMENT  2014    Medtronic dual chamber/Dr. Manriquez   • KNEE SURGERY Right    • PACEMAKER IMPLANTATION  05/2006        Social History     Occupational History     Employer: RETIRED   Tobacco Use   • Smoking status: Former Smoker     Types: Cigars   • Smokeless tobacco: Never Used   Substance and Sexual Activity   • Alcohol use: Yes     Comment: Moderate \"few beers daily\"   • Drug use: No   • Sexual activity: Defer      Social History     Social History Narrative   • Not on file        Family History   Problem Relation Age of Onset   • Cancer Other         LYMPH & BREAST   • Diabetes Other    • Hypertension Other    • Heart disease Other    • Breast cancer Mother    • Lymphoma Father    • Hemochromatosis Father        ROS: 14 point review of systems was performed and was negative except for documented findings in HPI and today's encounter.     Allergies: No Known Allergies  Constitutional:  Denies fever, shaking or chills   Eyes:  Denies change in visual acuity   HENT:  Denies nasal congestion or sore throat   Respiratory:  Denies cough or shortness of breath   Cardiovascular:  Denies chest pain or severe LE edema   GI:  Denies abdominal pain, nausea, vomiting, bloody stools or diarrhea   Musculoskeletal:  Numbness, tingling, or loss of motor function only as noted above in history of present illness.  : Denies painful " "urination or hematuria  Integument:  Denies rash, lesion or ulceration   Neurologic:  Denies headache or focal weakness  Endocrine:  Denies lymphadenopathy  Psych:  Denies confusion or change in mental status   Hem:  Denies active bleeding    Physical Exam:  Wt Readings from Last 3 Encounters:   06/10/19 93 kg (205 lb)   05/15/19 91.5 kg (201 lb 12.8 oz)   05/10/19 91.7 kg (202 lb 3.2 oz)     Ht Readings from Last 3 Encounters:   06/10/19 170.2 cm (67\")   02/08/19 165.1 cm (65\")   11/13/18 167 cm (65.75\")     Body mass index is 32.11 kg/m².  Facility age limit for growth percentiles is 20 years.  Vitals:    06/10/19 1023   Temp: 97.3 °F (36.3 °C)     Vital Signs:  reviewed  Constitutional: Awake alert and oriented x3, well developed, no acute distress, non-toxic appearance.  EYES: symmetric, sclera clear  ENT:  Normocephalic, Atraumatic.   Respiratory:  No respiratory distress, No wheezing  CV: pulse regular, no palpitations or pallor.  GI:  Abdomen soft, non-tender.   Vascular:  Intact distal pulses, No cyanosis, no signs or symptoms of DVT.  Neurologic: Sensation grossly intact to the involved extremity, No focal deficits noted.   Neck: No tenderness, Supple.  Integument: warm, dry, no ulcerations.   Psychiatric:  Oriented, no pathological affect.  Musculoskeletal:    Affected knee(s):  Painful gait with a subtle limp, positive for synovitis, swelling, joint effusion with crepitation.  Lachman negative  Posterior drawer negative  Tereza's negative  Patellofemoral grind +  Sensation grossly intact to light touch throughout the lower extremity  Skin is intact  Distal pulses are palpable  No signs or symptoms of DVT        Diagnostic Data:     Imaging was done previously in the office, images were personally viewed and discussed with the patient:    Indication: pain related symptoms,  Views: 3V AP, LAT & 40 degree PA left knee(s)   Findings: severe end-stage arthritis (bone on bone, subchondral sclerosis/cysts, " osteophytes)  Comparison views: viewed last xray done in the office.     Procedure:  Large Joint Arthrocentesis: L knee  Date/Time: 6/10/2019 10:24 AM  Consent given by: patient  Site marked: site marked  Timeout: Immediately prior to procedure a time out was called to verify the correct patient, procedure, equipment, support staff and site/side marked as required   Supporting Documentation  Indications: pain   Procedure Details  Location: knee - L knee  Needle gauge: 21.  Approach: anteromedial  Medications administered: 2 mL lidocaine (cardiac); 80 mg methylPREDNISolone acetate 80 MG/ML  Patient tolerance: patient tolerated the procedure well with no immediate complications          Assessment:     ICD-10-CM ICD-9-CM   1. Arthritis of left knee M17.12 716.96           Plan: Is to proceed with injection  Follow up as indicated.  Ice, elevate, and rest as needed.  Additional interventions include:  13 min spent face to face with patient 8 min spent counseling about:  Biomechanics of pertinent body area discussed.  Risks, benefits, alternatives, comparisons, and complications of accepted medicines, injections, recommendations, surgical procedures, and therapies explained and education provided in laymen's terms. Natural history and expected course of this patient's diagnosis discussed along with evaluation of therapies. Questions answered. When appropriate I also discussed proper use of cane, walker, trekking poles.   BMI:  The concept of BMI body mass index and its importance and implications discussed.  BMI suggested to be < 40 or as low as possible. Lifestyle measures for weight loss and how this affects orthopedic condition.  EXERCISES:  Advice on benefits of, and types of regular/moderate exercise including biomechanical forces involved as it pertains to this complaint.  MEDICATIONS:  Prescription, OTC and Monitoring of Medications per orders to address ortho complaints; Evaluation and discussion of safety,  precautions, side effects, and warnings given especially of long term NSAID or steroid therapy.    RICE: Rest, ice, compression, and elevation therapy, Cryotherapy/brachy therapy, and or OTC linaments as indicated with instructions.   Cortisone Injection. See procedure note.  Discussed Ibuprofen effects on kidneys and blood thinner that he is on, enc to continue with cortisone inj for relief of knee pain instead and use tylenol for intermittent pain due to health precautions with NSAIDS.  Verb understanding of these precautions. Enc to use ice as well for intermittent flares.   6/10/2019  Jennifer Vargas, APRN

## 2019-07-29 RX ORDER — SODIUM CHLORIDE 9 MG/ML
250 INJECTION, SOLUTION INTRAVENOUS ONCE
Status: CANCELLED | OUTPATIENT
Start: 2019-07-30

## 2019-07-30 ENCOUNTER — APPOINTMENT (OUTPATIENT)
Dept: ONCOLOGY | Facility: CLINIC | Age: 69
End: 2019-07-30

## 2019-07-30 ENCOUNTER — APPOINTMENT (OUTPATIENT)
Dept: ONCOLOGY | Facility: HOSPITAL | Age: 69
End: 2019-07-30

## 2019-07-30 ENCOUNTER — APPOINTMENT (OUTPATIENT)
Dept: LAB | Facility: HOSPITAL | Age: 69
End: 2019-07-30

## 2019-07-31 ENCOUNTER — LAB (OUTPATIENT)
Dept: LAB | Facility: HOSPITAL | Age: 69
End: 2019-07-31

## 2019-07-31 ENCOUNTER — OFFICE VISIT (OUTPATIENT)
Dept: ONCOLOGY | Facility: CLINIC | Age: 69
End: 2019-07-31

## 2019-07-31 ENCOUNTER — APPOINTMENT (OUTPATIENT)
Dept: ONCOLOGY | Facility: HOSPITAL | Age: 69
End: 2019-07-31

## 2019-07-31 VITALS
BODY MASS INDEX: 31.4 KG/M2 | HEART RATE: 96 BPM | DIASTOLIC BLOOD PRESSURE: 81 MMHG | HEIGHT: 67 IN | TEMPERATURE: 98.5 F | OXYGEN SATURATION: 98 % | SYSTOLIC BLOOD PRESSURE: 120 MMHG | RESPIRATION RATE: 14 BRPM | WEIGHT: 200.1 LBS

## 2019-07-31 DIAGNOSIS — E83.119 HEMOCHROMATOSIS, UNSPECIFIED HEMOCHROMATOSIS TYPE: Primary | ICD-10-CM

## 2019-07-31 LAB
BASOPHILS # BLD AUTO: 0.09 10*3/MM3 (ref 0–0.2)
BASOPHILS NFR BLD AUTO: 1.1 % (ref 0–1.5)
DEPRECATED RDW RBC AUTO: 48 FL (ref 37–54)
EOSINOPHIL # BLD AUTO: 0.27 10*3/MM3 (ref 0–0.4)
EOSINOPHIL NFR BLD AUTO: 3.3 % (ref 0.3–6.2)
ERYTHROCYTE [DISTWIDTH] IN BLOOD BY AUTOMATED COUNT: 12.8 % (ref 12.3–15.4)
FERRITIN SERPL-MCNC: 79.2 NG/ML (ref 30–400)
HCT VFR BLD AUTO: 42.6 % (ref 37.5–51)
HGB BLD-MCNC: 14.5 G/DL (ref 13–17.7)
IMM GRANULOCYTES # BLD AUTO: 0.03 10*3/MM3 (ref 0–0.05)
IMM GRANULOCYTES NFR BLD AUTO: 0.4 % (ref 0–0.5)
LYMPHOCYTES # BLD AUTO: 1.94 10*3/MM3 (ref 0.7–3.1)
LYMPHOCYTES NFR BLD AUTO: 23.8 % (ref 19.6–45.3)
MCH RBC QN AUTO: 34.7 PG (ref 26.6–33)
MCHC RBC AUTO-ENTMCNC: 34 G/DL (ref 31.5–35.7)
MCV RBC AUTO: 101.9 FL (ref 79–97)
MONOCYTES # BLD AUTO: 0.77 10*3/MM3 (ref 0.1–0.9)
MONOCYTES NFR BLD AUTO: 9.5 % (ref 5–12)
NEUTROPHILS # BLD AUTO: 5.04 10*3/MM3 (ref 1.7–7)
NEUTROPHILS NFR BLD AUTO: 61.9 % (ref 42.7–76)
NRBC BLD AUTO-RTO: 0 /100 WBC (ref 0–0.2)
PLATELET # BLD AUTO: 234 10*3/MM3 (ref 140–450)
PMV BLD AUTO: 9.4 FL (ref 6–12)
RBC # BLD AUTO: 4.18 10*6/MM3 (ref 4.14–5.8)
WBC NRBC COR # BLD: 8.14 10*3/MM3 (ref 3.4–10.8)

## 2019-07-31 PROCEDURE — 99213 OFFICE O/P EST LOW 20 MIN: CPT | Performed by: NURSE PRACTITIONER

## 2019-07-31 PROCEDURE — 36415 COLL VENOUS BLD VENIPUNCTURE: CPT | Performed by: NURSE PRACTITIONER

## 2019-07-31 PROCEDURE — 85025 COMPLETE CBC W/AUTO DIFF WBC: CPT | Performed by: NURSE PRACTITIONER

## 2019-07-31 PROCEDURE — 82728 ASSAY OF FERRITIN: CPT | Performed by: NURSE PRACTITIONER

## 2019-07-31 NOTE — PROGRESS NOTES
REASONS FOR FOLLOWUP:    Hemochromatosis    HISTORY OF PRESENT ILLNESS:  The patient is a 68 y.o. year old male formally seen by Dr. Aquino in this office, new to me today.  The patient has been undergoing phlebotomy roughly every 3 months during 2019.  His last two ferritin measurements were in the 90s.  Ferritin level today is pending.    The patient is quick to state that he will not wait today to have possible phlebotomy.  When asked about rescheduling he says he will think about it.  The patient denies any new symptomatology.  He denies any abdominal pain, shortness of breath, fever, change in bowel or bladder function.    The patient denies new concerns today.    Past Medical History:   Diagnosis Date   • Atrial fibrillation (CMS/HCC)    • Atrial flutter (CMS/HCC)    • Diabetes (CMS/HCC)    • Gout    • Heart disease     Status post pacemaker implantation   • Hemochromatosis    • History of syncope    • Hyperlipidemia    • Malignant neoplasm of prostate (CMS/HCC) 2018   • RBBB (right bundle branch block)    • Sleep apnea    • SSS (sick sinus syndrome) (CMS/HCC)    • SVT (supraventricular tachycardia) (CMS/HCC)      Past Surgical History:   Procedure Laterality Date   • CARDIAC ABLATION  2010    Paroxysmal atrial fib/Dr. Koo   • CARDIAC ABLATION  2012    Recurrent atrial flutter/Dr. Koo   • CARDIAC CATHETERIZATION     • CARDIAC PACEMAKER PLACEMENT  2014    Medtronic dual chamber/Dr. Manriquez   • KNEE SURGERY Right    • PACEMAKER IMPLANTATION  05/2006       HEMATOLOGIC/ONCOLOGIC HISTORY:  (History from previous dates can be found in the separate document.)    MEDICATIONS    Current Outpatient Medications:   •  allopurinol (ZYLOPRIM) 300 MG tablet, Take 300 mg by mouth Daily., Disp: , Rfl:   •  atorvastatin (LIPITOR) 40 MG tablet, Take 40 mg by mouth Daily., Disp: , Rfl:   •  lisinopril (PRINIVIL,ZESTRIL) 10 MG tablet, Take 10 mg by mouth Daily., Disp: , Rfl:   •  metFORMIN (GLUCOPHAGE) 500 MG tablet, Take 500 mg  "by mouth Daily With Breakfast., Disp: , Rfl:   •  metoprolol succinate XL (TOPROL-XL) 25 MG 24 hr tablet, Take 25 mg by mouth Daily., Disp: , Rfl:   •  Multiple Vitamins-Minerals (MULTIVITAMIN ADULT PO), Take  by mouth Daily., Disp: , Rfl:   •  XARELTO 20 MG tablet, , Disp: , Rfl:   •  aspirin 81 MG EC tablet, Take 81 mg by mouth daily., Disp: , Rfl:   •  ibuprofen (ADVIL,MOTRIN) 800 MG tablet, Take 800 mg by mouth Every 6 (Six) Hours As Needed., Disp: , Rfl:     ALLERGIES:   No Known Allergies    SOCIAL HISTORY:       Social History     Socioeconomic History   • Marital status:      Spouse name: Suzie   • Number of children: Not on file   • Years of education: High School   • Highest education level: Not on file   Occupational History     Employer: RETIRED   Tobacco Use   • Smoking status: Former Smoker     Types: Cigars   • Smokeless tobacco: Never Used   Substance and Sexual Activity   • Alcohol use: Yes     Comment: Moderate \"few beers daily\"   • Drug use: No   • Sexual activity: Defer         FAMILY HISTORY:  Family History   Problem Relation Age of Onset   • Cancer Other         LYMPH & BREAST   • Diabetes Other    • Hypertension Other    • Heart disease Other    • Breast cancer Mother    • Lymphoma Father    • Hemochromatosis Father        REVIEW OF SYSTEMS:  GENERAL: No change in appetite or weight;   No fevers, chills, sweats.    SKIN: No nonhealing lesions.   No rashes.  HEME/LYMPH: No easy bruising, bleeding.   No swollen nodes.   EYES: No vision changes or diplopia.   ENT: No tinnitus, hearing loss, gum bleeding, epistaxis, hoarseness or dysphagia.   RESPIRATORY: No cough, shortness of breath, hemoptysis or wheezing.   CVS: No chest pain, palpitations, orthopnea, dyspnea on exertion or PND.   GI: No melena or hematochezia.   No abdominal pain.  No nausea, vomiting, constipation, diarrhea  : No lower tract obstructive symptoms, dysuria or hematuria.   MUSCULOSKELETAL: No bone pain.  No joint " "stiffness.   NEUROLOGICAL: No global weakness, loss of consciousness or seizures.   PSYCHIATRIC: No increased nervousness, mood changes or depression.   Review of systems unchanged except where noted, 7/31/2019    Objective    Vitals:    07/31/19 1403   BP: 120/81   Pulse: 96   Resp: 14   Temp: 98.5 °F (36.9 °C)   TempSrc: Oral   SpO2: 98%   Weight: 90.8 kg (200 lb 1.6 oz)   Height: 170.2 cm (67.01\")   PainSc: 0-No pain     Current Status 7/31/2019   ECOG score 0       PHYSICAL EXAM:    GENERAL:  Well-developed, well-nourished in no acute distress.   SKIN:  Warm, dry without rashes, purpura or petechiae.  EYES:  Pupils equal, round and reactive to light.  EOMs intact.  Conjunctivae normal.  EARS:  Hearing intact.  NOSE:  Septum midline.  No excoriations or nasal discharge.  MOUTH:  Tongue is well-papillated; no stomatitis or ulcers.  Lips normal.  THROAT:  Oropharynx without lesions or exudates.  NECK:  Supple with good range of motion; no thyromegaly or masses, no JVD.  LYMPHATICS:  No cervical, supraclavicular, axillary or inguinal adenopathy.  CHEST:  Lungs clear to auscultation. Good airflow.  CARDIAC:  Regular rate and rhythm without murmurs, rubs or gallops. Normal S1,S2.  ABDOMEN:  Soft, nontender with no hepatosplenomegaly or masses.  EXTREMITIES:  No clubbing, cyanosis or edema.  NEUROLOGICAL:  Cranial Nerves II-XII grossly intact.  No focal neurological deficits.  PSYCHIATRIC:  Normal affect and mood.       RECENT LABS:  Results from last 7 days   Lab Units 07/31/19  1349   WBC 10*3/mm3 8.14   NEUTROS ABS 10*3/mm3 5.04   HEMOGLOBIN g/dL 14.5   HEMATOCRIT % 42.6   PLATELETS 10*3/mm3 234     Results from last 7 days   Lab Units 07/31/19  1349   FERRITIN ng/mL 79.20             Assessment/Plan    ASSESSMENT:  Hereditary hemochromatosis: Last phlebo was 5/15/2019, Ferritin was 99.1 at that time.  At the time of seeing this patient today, ferritin level was not available.  Patient refused to wait for this.  He " was asked to reschedule phlebotomy to convenient time within the next week assuming that he needs it.    At the time of this dictation ferritin level is now available, 79.  I have called the patient and asked him to keep his appointment for next week for phlebotomy as scheduled.  He verbalized understanding.    Otherwise this patient will return in 3 months to see Dr. Morales, as requested, with CBC and ferritin done 1 week prior and possible phlebotomy the same day of MD visit.    PLAN:  1.  She will return in 1 week for phlebotomy.  2.  Patient to return in 3 months, 1 week prior for ferritin and CBC level and then the following week for MD review and possible phlebotomy.  3.  Goal is to keep ferritin closer to 50.    This patient and all records in the chart are new to me as of today, seeing this patient following the departure of Dr. Aquino.

## 2019-08-08 ENCOUNTER — APPOINTMENT (OUTPATIENT)
Dept: ONCOLOGY | Facility: HOSPITAL | Age: 69
End: 2019-08-08

## 2019-08-13 ENCOUNTER — INFUSION (OUTPATIENT)
Dept: ONCOLOGY | Facility: HOSPITAL | Age: 69
End: 2019-08-13

## 2019-08-13 VITALS
DIASTOLIC BLOOD PRESSURE: 80 MMHG | OXYGEN SATURATION: 98 % | SYSTOLIC BLOOD PRESSURE: 129 MMHG | BODY MASS INDEX: 31.35 KG/M2 | WEIGHT: 200.2 LBS | RESPIRATION RATE: 16 BRPM | TEMPERATURE: 98.3 F | HEART RATE: 98 BPM

## 2019-08-13 DIAGNOSIS — E83.110 HEREDITARY HEMOCHROMATOSIS (HCC): Primary | ICD-10-CM

## 2019-08-13 PROCEDURE — 99195 PHLEBOTOMY: CPT | Performed by: INTERNAL MEDICINE

## 2019-08-13 RX ORDER — SODIUM CHLORIDE 9 MG/ML
250 INJECTION, SOLUTION INTRAVENOUS ONCE
Status: CANCELLED | OUTPATIENT
Start: 2019-08-13

## 2019-10-16 ENCOUNTER — CLINICAL SUPPORT (OUTPATIENT)
Dept: ORTHOPEDIC SURGERY | Facility: CLINIC | Age: 69
End: 2019-10-16

## 2019-10-16 VITALS — BODY MASS INDEX: 32.69 KG/M2 | WEIGHT: 203.4 LBS | HEIGHT: 66 IN | TEMPERATURE: 98.2 F

## 2019-10-16 DIAGNOSIS — G89.29 CHRONIC PAIN OF LEFT KNEE: Primary | ICD-10-CM

## 2019-10-16 DIAGNOSIS — M17.12 ARTHRITIS OF LEFT KNEE: ICD-10-CM

## 2019-10-16 DIAGNOSIS — M25.562 CHRONIC PAIN OF LEFT KNEE: Primary | ICD-10-CM

## 2019-10-16 PROCEDURE — 99213 OFFICE O/P EST LOW 20 MIN: CPT | Performed by: NURSE PRACTITIONER

## 2019-10-16 PROCEDURE — 73562 X-RAY EXAM OF KNEE 3: CPT | Performed by: NURSE PRACTITIONER

## 2019-10-16 PROCEDURE — 20610 DRAIN/INJ JOINT/BURSA W/O US: CPT | Performed by: NURSE PRACTITIONER

## 2019-10-16 RX ORDER — METHYLPREDNISOLONE ACETATE 80 MG/ML
80 INJECTION, SUSPENSION INTRA-ARTICULAR; INTRALESIONAL; INTRAMUSCULAR; SOFT TISSUE
Status: COMPLETED | OUTPATIENT
Start: 2019-10-16 | End: 2019-10-16

## 2019-10-16 RX ADMIN — METHYLPREDNISOLONE ACETATE 80 MG: 80 INJECTION, SUSPENSION INTRA-ARTICULAR; INTRALESIONAL; INTRAMUSCULAR; SOFT TISSUE at 08:18

## 2019-10-16 NOTE — PROGRESS NOTES
Patient: Loyd Avalos  YOB: 1950    Chief Complaints:  left knee pain    Subjective:    History of Present Illness: Here today for knee pain. Left knee is flared today.  The pain is a generalized joint tenderness.  It has been progressive in nature but remains intermittent.  Worsened by prolonged standing or walking and squatting activities. Has had improvement in the past with ice/heat, rest, and injections.     This problem is not new to this examiner.     Allergies: No Known Allergies    Medications:   Home Medications:  Current Outpatient Medications on File Prior to Visit   Medication Sig   • allopurinol (ZYLOPRIM) 300 MG tablet Take 300 mg by mouth Daily.   • aspirin 81 MG EC tablet Take 81 mg by mouth daily.   • atorvastatin (LIPITOR) 40 MG tablet Take 40 mg by mouth Daily.   • ibuprofen (ADVIL,MOTRIN) 800 MG tablet Take 800 mg by mouth Every 6 (Six) Hours As Needed.   • lisinopril (PRINIVIL,ZESTRIL) 10 MG tablet Take 10 mg by mouth Daily.   • metFORMIN (GLUCOPHAGE) 500 MG tablet Take 500 mg by mouth Daily With Breakfast.   • metoprolol succinate XL (TOPROL-XL) 25 MG 24 hr tablet Take 25 mg by mouth Daily.   • Multiple Vitamins-Minerals (MULTIVITAMIN ADULT PO) Take  by mouth Daily.   • XARELTO 20 MG tablet      No current facility-administered medications on file prior to visit.      Current Medications:  Scheduled Meds:  Continuous Infusions:  No current facility-administered medications for this visit.   PRN Meds:.    I have reviewed the patient's medical history in detail and updated the computerized patient record.  Review and summarization of old records include:    Past Medical History:   Diagnosis Date   • Atrial fibrillation (CMS/HCC)    • Atrial flutter (CMS/HCC)    • Diabetes (CMS/HCC)    • Gout    • Heart disease     Status post pacemaker implantation   • Hemochromatosis    • History of syncope    • Hyperlipidemia    • Malignant neoplasm of prostate (CMS/HCC) 2018   • RBBB  "(right bundle branch block)    • Sleep apnea    • SSS (sick sinus syndrome) (CMS/HCC)    • SVT (supraventricular tachycardia) (CMS/HCC)         Past Surgical History:   Procedure Laterality Date   • CARDIAC ABLATION  2010    Paroxysmal atrial fib/Dr. Koo   • CARDIAC ABLATION  2012    Recurrent atrial flutter/Dr. Koo   • CARDIAC CATHETERIZATION     • CARDIAC PACEMAKER PLACEMENT  2014    Medtronic dual chamber/Dr. Manriquez   • KNEE SURGERY Right    • PACEMAKER IMPLANTATION  05/2006        Social History     Occupational History     Employer: RETIRED   Tobacco Use   • Smoking status: Former Smoker     Types: Cigars   • Smokeless tobacco: Never Used   Substance and Sexual Activity   • Alcohol use: Yes     Comment: Moderate \"few beers daily\"   • Drug use: No   • Sexual activity: Defer      Social History     Social History Narrative   • Not on file        Family History   Problem Relation Age of Onset   • Cancer Other         LYMPH & BREAST   • Diabetes Other    • Hypertension Other    • Heart disease Other    • Breast cancer Mother    • Lymphoma Father    • Hemochromatosis Father        ROS: 14 point review of systems was performed and was negative except for documented findings in HPI and today's encounter.     Allergies: No Known Allergies  Constitutional:  Denies fever, shaking or chills   Eyes:  Denies change in visual acuity   HENT:  Denies nasal congestion or sore throat   Respiratory:  Denies cough or shortness of breath   Cardiovascular:  Denies chest pain or severe LE edema   GI:  Denies abdominal pain, nausea, vomiting, bloody stools or diarrhea   Musculoskeletal:  Numbness, tingling, or loss of motor function only as noted above in history of present illness.  : Denies painful urination or hematuria  Integument:  Denies rash, lesion or ulceration   Neurologic:  Denies headache or focal weakness  Endocrine:  Denies lymphadenopathy  Psych:  Denies confusion or change in mental status   Hem:  Denies active " "bleeding    Physical Exam: 69 y.o. male  Wt Readings from Last 3 Encounters:   10/16/19 92.3 kg (203 lb 6.4 oz)   08/13/19 90.8 kg (200 lb 3.2 oz)   07/31/19 90.8 kg (200 lb 1.6 oz)     Ht Readings from Last 1 Encounters:   10/16/19 166.4 cm (65.5\")     Body mass index is 33.33 kg/m².  Vitals:    10/16/19 1529   Temp: 98.2 °F (36.8 °C)     Vital signs reviewed.   Constitutional: Awake alert and oriented x3, well developed, no acute distress, non-toxic appearance.  EYES: symmetric, sclera clear  ENT:  Normocephalic, Atraumatic.   Respiratory:  No respiratory distress, No wheezing  CV: pulse regular, no palpitations or pallor.  GI:  Abdomen soft, non-tender.   Vascular:  Intact distal pulses, No cyanosis, no signs or symptoms of DVT.  Neurologic: Sensation grossly intact to the involved extremity, No focal deficits noted.   Neck: No tenderness, Supple.  Integument: warm, dry, no ulcerations.   Psychiatric:  Oriented, no pathological affect.  Musculoskeletal:    Affected knee(s):  Painful gait with a subtle limp, positive for synovitis, swelling, joint effusion with crepitation.  Lachman negative  Posterior drawer negative  Tereza's negative  Patellofemoral grind +  Sensation grossly intact to light touch throughout the lower extremity  Skin is intact  Distal pulses are palpable  No signs or symptoms of DVT        Diagnostic Data:     Imaging was done today, images were personally viewed and discussed with the patient:    Indication: pain related symptoms,  Views: 3V AP, LAT & 40 degree PA left knee(s)   Findings: severe end-stage arthritis (bone on bone, subchondral sclerosis/cysts, osteophytes)  Comparison views: viewed last xray done in the office.     Procedure:  Large Joint Arthrocentesis: L knee  Date/Time: 10/16/2019 8:18 AM  Consent given by: patient  Site marked: site marked  Timeout: Immediately prior to procedure a time out was called to verify the correct patient, procedure, equipment, support staff and " site/side marked as required   Supporting Documentation  Indications: pain and joint swelling   Procedure Details  Location: knee - L knee  Preparation: Patient was prepped and draped in the usual sterile fashion  Needle gauge: 21.  Approach: anterolateral  Medications administered: 4 mL lidocaine (cardiac); 80 mg methylPREDNISolone acetate 80 MG/ML  Patient tolerance: patient tolerated the procedure well with no immediate complications          Assessment:     ICD-10-CM ICD-9-CM   1. Chronic pain of left knee M25.562 719.46    G89.29 338.29   2. Arthritis of left knee M17.12 716.96           Plan: Is to proceed with injection  Follow up as indicated.  Ice, elevate, and rest as needed.  Additional interventions include:  15 min spent face to face with patient 11 min spent counseling about:  Biomechanics of pertinent body area discussed.  Risks, benefits, alternatives, comparisons, and complications of accepted medicines, injections, recommendations, surgical procedures, and therapies explained and education provided in laymen's terms. Natural history and expected course of this patient's diagnosis discussed along with evaluation of therapies. Questions answered. When appropriate I also discussed proper use of cane, walker, trekking poles.   BMI:  The concept of BMI body mass index and its importance and implications discussed.  BMI suggested to be < 40 or as low as possible. Lifestyle measures for weight loss and how this affects orthopedic condition.  EXERCISES:  Advice on benefits of, and types of regular/moderate exercise including biomechanical forces involved as it pertains to this complaint.  DIABETES:  Diabetic counseling on effects of coritisone injections given and importance of keeping Hgb A1c <7.5.   RICE: Rest, ice, compression, and elevation therapy, Cryotherapy/brachy therapy, and or OTC linaments as indicated with instructions.   Cortisone Injection. See procedure note.  The right knee is also flaring  a little will call if he wants cortisone for it.  Has diet controlled with po meds DM and wants to do one injection at a time.   10/16/2019  Jennifer Vargas, APRN

## 2019-10-16 NOTE — PATIENT INSTRUCTIONS
[facility]    KNEE HOME EXERCISES      It is important that you maintain and possibly improve your strength and range of motion of your knee.      •  Walk frequently for short intervals  •  Using a cane or walker to allow you to walk safely if needed  •  Avoid sitting for long periods of time to avoid cramping and swelling of your leg   •  Do exercises either on a bed or in a chair.  •  If any of the exercises cause you pain, either eliminate that exercise or decrease          the motion or repetitions      Start exercises with 10 repetitions and increase to 30 repetitions.  Do two sets of each exercise each day    ANKLE PUMPS    1. Move your feet up and down as if you are pumping on a gas pedal       STRAIGHT LEG RAISES    1. Lie flat with your injured leg straight.  Keep the other leg bent.  2. Tighten the injured leg's thigh muscle.  3. Lift leg, with knee locked in the straight position no higher than the other knee for  seconds  4. Lower leg slowly. Rest for 5 seconds      SHORT ARC QUAD    1. Lie with both knees bent over a pillow  2. Straighten both knees  3. Hold 5 seconds  4. Bend knees back to starting position and repeat sequence       QUADRICEPS     1. Lie flat with your injured let straight.  Keep the other leg bent.  2. Tighten the injured leg's thigh muscle by trying to move your kneecap toward the  thigh.  3. Make your leg as stiff as you can.  4. Hold for 5 seconds and relax for 5 seconds        HAMSTRING STRETCH    1. Lie flat with your injured leg straight. Keep the other leg bent  2. Press your heel of your injured leg into the floor for 5 seconds       OR  1. Sit with injured leg out straight.   2. Loop a sheet around the ball of foot and toes.  3. Gently pull on the sheet, keeping the leg straight  4. Hold for 10-30 seconds      KNEE FLEXION-EXTENSION SITTING     1. Sit with injured let bent as shown  2. Straighten leg at the knee  3. Hold 5 seconds  4. Bend knee back  to starting position   5. Rest for 2-5 seconds and repeat sequence       HEEL SLIDES     1. Lie on back with legs straight  2. Slide heels toward buttocks  3. Return to starting position       HEEL SLIDS WITH SHEET    1. Lie on your back with a sheet wrapped around your foot  2. Pull on the sheet to assist you in bending your knee and sliding your heel toward  your buttocks  3. Hold for 5 seconds        KNEE FLEXION STRETCH    1. Sit in a chair  2. Bend bad knee back as far as you can   3. Hold stretch for 5-10 seconds      CHAIR PUSH UPS    1. Sit in a chair with arm rests  2. Place hands on armrests  3. Straighten arms, raising buttocks up off of chair         WALL SLIDES    1. Stand with your back and head against a wall.    2. Keep your feet shoulder width apart and 6-12 inches from the wall  3. Slowly slide down the wall until your knees are slightly bent.    4. Hold for 5 seconds and then slowly slide back up  5. As you get stronger, then you can slowly increase the bend in your knees, but do  not drop your buttocks below the level of your knees       STEP UPS    1. Stand with your foot on your injured leg on a 3-5 inch support (such as a block of  wood)  2. Place other foot on the floor  3. Shift your weight onto the foot on the block and try to straighten the knee and  raising the other foot off of the floor  4. Slowly lower your foot until only the heel touches the floor

## 2019-11-12 ENCOUNTER — APPOINTMENT (OUTPATIENT)
Dept: ONCOLOGY | Facility: HOSPITAL | Age: 69
End: 2019-11-12

## 2019-11-12 ENCOUNTER — OFFICE VISIT (OUTPATIENT)
Dept: ONCOLOGY | Facility: CLINIC | Age: 69
End: 2019-11-12

## 2019-11-12 ENCOUNTER — LAB (OUTPATIENT)
Dept: LAB | Facility: HOSPITAL | Age: 69
End: 2019-11-12

## 2019-11-12 VITALS
HEART RATE: 88 BPM | DIASTOLIC BLOOD PRESSURE: 93 MMHG | TEMPERATURE: 98.2 F | RESPIRATION RATE: 16 BRPM | BODY MASS INDEX: 31.66 KG/M2 | SYSTOLIC BLOOD PRESSURE: 159 MMHG | HEIGHT: 67 IN | WEIGHT: 201.7 LBS | OXYGEN SATURATION: 97 %

## 2019-11-12 DIAGNOSIS — E83.110 HEREDITARY HEMOCHROMATOSIS (HCC): Primary | ICD-10-CM

## 2019-11-12 DIAGNOSIS — E83.119 HEMOCHROMATOSIS, UNSPECIFIED HEMOCHROMATOSIS TYPE: Primary | ICD-10-CM

## 2019-11-12 DIAGNOSIS — E83.110 HEREDITARY HEMOCHROMATOSIS (HCC): ICD-10-CM

## 2019-11-12 LAB
BASOPHILS # BLD AUTO: 0.06 10*3/MM3 (ref 0–0.2)
BASOPHILS NFR BLD AUTO: 0.7 % (ref 0–1.5)
DEPRECATED RDW RBC AUTO: 46.1 FL (ref 37–54)
EOSINOPHIL # BLD AUTO: 0.29 10*3/MM3 (ref 0–0.4)
EOSINOPHIL NFR BLD AUTO: 3.5 % (ref 0.3–6.2)
ERYTHROCYTE [DISTWIDTH] IN BLOOD BY AUTOMATED COUNT: 12.7 % (ref 12.3–15.4)
FERRITIN SERPL-MCNC: 83.9 NG/ML (ref 30–400)
HCT VFR BLD AUTO: 42 % (ref 37.5–51)
HGB BLD-MCNC: 14.4 G/DL (ref 13–17.7)
IMM GRANULOCYTES # BLD AUTO: 0.02 10*3/MM3 (ref 0–0.05)
IMM GRANULOCYTES NFR BLD AUTO: 0.2 % (ref 0–0.5)
IRON 24H UR-MRATE: 106 MCG/DL (ref 59–158)
IRON SATN MFR SERPL: 31 % (ref 14–48)
LYMPHOCYTES # BLD AUTO: 1.82 10*3/MM3 (ref 0.7–3.1)
LYMPHOCYTES NFR BLD AUTO: 22.2 % (ref 19.6–45.3)
MCH RBC QN AUTO: 33.9 PG (ref 26.6–33)
MCHC RBC AUTO-ENTMCNC: 34.3 G/DL (ref 31.5–35.7)
MCV RBC AUTO: 98.8 FL (ref 79–97)
MONOCYTES # BLD AUTO: 0.82 10*3/MM3 (ref 0.1–0.9)
MONOCYTES NFR BLD AUTO: 10 % (ref 5–12)
NEUTROPHILS # BLD AUTO: 5.18 10*3/MM3 (ref 1.7–7)
NEUTROPHILS NFR BLD AUTO: 63.4 % (ref 42.7–76)
NRBC BLD AUTO-RTO: 0 /100 WBC (ref 0–0.2)
PLATELET # BLD AUTO: 173 10*3/MM3 (ref 140–450)
PMV BLD AUTO: 9.1 FL (ref 6–12)
RBC # BLD AUTO: 4.25 10*6/MM3 (ref 4.14–5.8)
TIBC SERPL-MCNC: 347 MCG/DL (ref 249–505)
TRANSFERRIN SERPL-MCNC: 248 MG/DL (ref 200–360)
WBC NRBC COR # BLD: 8.19 10*3/MM3 (ref 3.4–10.8)

## 2019-11-12 PROCEDURE — 36415 COLL VENOUS BLD VENIPUNCTURE: CPT

## 2019-11-12 PROCEDURE — 83540 ASSAY OF IRON: CPT

## 2019-11-12 PROCEDURE — G0463 HOSPITAL OUTPT CLINIC VISIT: HCPCS | Performed by: INTERNAL MEDICINE

## 2019-11-12 PROCEDURE — 85025 COMPLETE CBC W/AUTO DIFF WBC: CPT

## 2019-11-12 PROCEDURE — 84466 ASSAY OF TRANSFERRIN: CPT

## 2019-11-12 PROCEDURE — 99214 OFFICE O/P EST MOD 30 MIN: CPT | Performed by: INTERNAL MEDICINE

## 2019-11-12 PROCEDURE — 82728 ASSAY OF FERRITIN: CPT

## 2019-11-12 NOTE — PROGRESS NOTES
REASONS FOR FOLLOWUP:    Hemochromatosis    HISTORY OF PRESENT ILLNESS:  The patient is a 69 y.o. year old male formally seen by Dr. Aquino in this office, with hemochromatosis on intermittent phlebotomy the patient has been undergoing phlebotomy roughly every 3 months during 2019.  His last two ferritin measurements were in the 90s.  Ferritin level today is 80.  He cannot stay today and wants to do 3 phlebotomies per year which is very reasonable    He is still smoking cigars and I try to talk him into stopping this  He drinks 6-8 beers a week and sometimes more  Is not had a colonoscopy recently and has not followed up with a cardiologist in a while And remains on Xarelto for his atrial fibrillation      Past Medical History:   Diagnosis Date   • Atrial fibrillation (CMS/HCC)    • Atrial flutter (CMS/HCC)    • Diabetes (CMS/HCC)    • Gout    • Heart disease     Status post pacemaker implantation   • Hemochromatosis    • History of syncope    • Hyperlipidemia    • Malignant neoplasm of prostate (CMS/HCC) 2018   • RBBB (right bundle branch block)    • Sleep apnea    • SSS (sick sinus syndrome) (CMS/HCC)    • SVT (supraventricular tachycardia) (CMS/HCC)      Past Surgical History:   Procedure Laterality Date   • CARDIAC ABLATION  2010    Paroxysmal atrial fib/Dr. Koo   • CARDIAC ABLATION  2012    Recurrent atrial flutter/Dr. Koo   • CARDIAC CATHETERIZATION     • CARDIAC PACEMAKER PLACEMENT  2014    Medtronic dual chamber/Dr. Manriquez   • KNEE SURGERY Right    • PACEMAKER IMPLANTATION  05/2006       HEMATOLOGIC/ONCOLOGIC HISTORY:  (History from previous dates can be found in the separate document.)    MEDICATIONS    Current Outpatient Medications:   •  allopurinol (ZYLOPRIM) 300 MG tablet, Take 300 mg by mouth Daily., Disp: , Rfl:   •  atorvastatin (LIPITOR) 40 MG tablet, Take 40 mg by mouth Daily., Disp: , Rfl:   •  lisinopril (PRINIVIL,ZESTRIL) 10 MG tablet, Take 10 mg by mouth Daily., Disp: , Rfl:   •  metFORMIN  "(GLUCOPHAGE) 500 MG tablet, Take 500 mg by mouth Daily With Breakfast., Disp: , Rfl:   •  metoprolol succinate XL (TOPROL-XL) 25 MG 24 hr tablet, Take 25 mg by mouth Daily., Disp: , Rfl:   •  Multiple Vitamins-Minerals (MULTIVITAMIN ADULT PO), Take  by mouth Daily., Disp: , Rfl:   •  XARELTO 20 MG tablet, , Disp: , Rfl:     ALLERGIES:   No Known Allergies    SOCIAL HISTORY:       Social History     Socioeconomic History   • Marital status:      Spouse name: Suzie   • Number of children: Not on file   • Years of education: High School   • Highest education level: Not on file   Occupational History     Employer: RETIRED   Tobacco Use   • Smoking status: Former Smoker     Types: Cigars   • Smokeless tobacco: Never Used   Substance and Sexual Activity   • Alcohol use: Yes     Comment: Moderate \"few beers daily\"   • Drug use: No   • Sexual activity: Defer         FAMILY HISTORY:  Family History   Problem Relation Age of Onset   • Cancer Other         LYMPH & BREAST   • Diabetes Other    • Hypertension Other    • Heart disease Other    • Breast cancer Mother    • Lymphoma Father    • Hemochromatosis Father        REVIEW OF SYSTEMS:  GENERAL: No change in appetite or weight;   No fevers, chills, sweats.    SKIN: No nonhealing lesions.   No rashes.  HEME/LYMPH: No easy bruising, bleeding.   No swollen nodes.   EYES: No vision changes or diplopia.   ENT: No tinnitus, hearing loss, gum bleeding, epistaxis, hoarseness or dysphagia.   RESPIRATORY: No cough, shortness of breath, hemoptysis or wheezing.   CVS: No chest pain, palpitations, orthopnea, dyspnea on exertion or PND.   GI: No melena or hematochezia.   No abdominal pain.  No nausea, vomiting, constipation, diarrhea  : No lower tract obstructive symptoms, dysuria or hematuria.   MUSCULOSKELETAL: No bone pain.  No joint stiffness.   NEUROLOGICAL: No global weakness, loss of consciousness or seizures.   PSYCHIATRIC: No increased nervousness, mood changes or " "depression.   Review of systems unchanged except where noted, 11/12/2019  Objective    Vitals:    11/12/19 1334   BP: 159/93   Pulse: 88   Resp: 16   Temp: 98.2 °F (36.8 °C)   SpO2: 97%   Weight: 91.5 kg (201 lb 11.2 oz)   Height: 170.2 cm (67.01\")   PainSc: 0-No pain     Current Status 11/12/2019   ECOG score 0       PHYSICAL EXAM:    GENERAL:  Well-developed, well-nourished in no acute distress.   SKIN:  Warm, dry without rashes, purpura or petechiae.  EYES:  Pupils equal, round and reactive to light.  EOMs intact.  Conjunctivae normal.  EARS:  Hearing intact.  NOSE:  Septum midline.  No excoriations or nasal discharge.  MOUTH:  Tongue is well-papillated; no stomatitis or ulcers.  Lips normal.  THROAT:  Oropharynx without lesions or exudates.  NECK:  Supple with good range of motion; no thyromegaly or masses, no JVD.  LYMPHATICS:  No cervical, supraclavicular, axillary or inguinal adenopathy.  CHEST:  Lungs clear to auscultation. Good airflow.  CARDIAC:  Regular rate and rhythm without murmurs, rubs or gallops. Normal S1,S2.  ABDOMEN:  Soft, nontender with no hepatosplenomegaly or masses.  EXTREMITIES:  No clubbing, cyanosis or edema.  NEUROLOGICAL:  Cranial Nerves II-XII grossly intact.  No focal neurological deficits.  PSYCHIATRIC:  Normal affect and mood.       RECENT LABS:  Results from last 7 days   Lab Units 11/12/19  1340   WBC 10*3/mm3 8.19   NEUTROS ABS 10*3/mm3 5.18   HEMOGLOBIN g/dL 14.4   HEMATOCRIT % 42.0   PLATELETS 10*3/mm3 173     Results from last 7 days   Lab Units 11/12/19  1340   FERRITIN ng/mL 83.90   IRON mcg/dL 106   TIBC mcg/dL 347             Assessment/Plan    ASSESSMENT:  1.Hereditary hemochromatosis: Last phlebo was 5/15/2019, Ferritin was 99.1 at that time. At the time of this dictation ferritin level is now available, 83.    He wants to hold off phlebotomy at this time and as long as he is below 100 that is okay with me  2.  Cigar use-discussed smoking cessation  3.  Atrial " fibrillation on Xarelto  4.  Sick sinus syndrome    PLAN:  1.  Return in 4 months for phlebotomy and CBC and NP visit  2.  See me in a year with CBC and phlebotomy in 8 months   3.  Goal is to keep ferritin  .    This patient and all records in the chart are new to me as of today, seeing this patient following the departure of Dr. Aquino.

## 2020-01-17 ENCOUNTER — CLINICAL SUPPORT (OUTPATIENT)
Dept: ORTHOPEDIC SURGERY | Facility: CLINIC | Age: 70
End: 2020-01-17

## 2020-01-17 VITALS — BODY MASS INDEX: 31.55 KG/M2 | HEIGHT: 67 IN | TEMPERATURE: 98.3 F | WEIGHT: 201 LBS

## 2020-01-17 DIAGNOSIS — M17.12 ARTHRITIS OF LEFT KNEE: Primary | ICD-10-CM

## 2020-01-17 PROCEDURE — 99212 OFFICE O/P EST SF 10 MIN: CPT | Performed by: NURSE PRACTITIONER

## 2020-01-17 PROCEDURE — 20610 DRAIN/INJ JOINT/BURSA W/O US: CPT | Performed by: NURSE PRACTITIONER

## 2020-01-17 RX ORDER — WARFARIN SODIUM 5 MG/1
5 TABLET ORAL DAILY
COMMUNITY
Start: 2020-01-15

## 2020-01-17 RX ORDER — METHYLPREDNISOLONE ACETATE 80 MG/ML
80 INJECTION, SUSPENSION INTRA-ARTICULAR; INTRALESIONAL; INTRAMUSCULAR; SOFT TISSUE
Status: COMPLETED | OUTPATIENT
Start: 2020-01-17 | End: 2020-01-17

## 2020-01-17 RX ADMIN — METHYLPREDNISOLONE ACETATE 80 MG: 80 INJECTION, SUSPENSION INTRA-ARTICULAR; INTRALESIONAL; INTRAMUSCULAR; SOFT TISSUE at 07:59

## 2020-01-17 NOTE — PROGRESS NOTES
Patient: Loyd Avalos  YOB: 1950    Chief Complaints:  left knee pain    Subjective:    History of Present Illness: Here today for knee pain. He has done really well this time in between injections and wants to continue with this plan. The pain is a generalized joint tenderness.  It has been progressive in nature but remains intermittent.  Worsened by prolonged standing or walking and squatting activities. Has had improvement in the past with ice/heat, rest, and injections.     This problem is not new to this examiner.     Allergies: No Known Allergies    Medications:   Home Medications:  Current Outpatient Medications on File Prior to Visit   Medication Sig   • allopurinol (ZYLOPRIM) 300 MG tablet Take 300 mg by mouth Daily.   • atorvastatin (LIPITOR) 40 MG tablet Take 40 mg by mouth Daily.   • lisinopril (PRINIVIL,ZESTRIL) 10 MG tablet Take 10 mg by mouth Daily.   • metFORMIN (GLUCOPHAGE) 500 MG tablet Take 500 mg by mouth Daily With Breakfast.   • metoprolol succinate XL (TOPROL-XL) 25 MG 24 hr tablet Take 25 mg by mouth Daily.   • warfarin (COUMADIN) 5 MG tablet    • [DISCONTINUED] Multiple Vitamins-Minerals (MULTIVITAMIN ADULT PO) Take  by mouth Daily.   • [DISCONTINUED] XARELTO 20 MG tablet      No current facility-administered medications on file prior to visit.      Current Medications:  Scheduled Meds:  Continuous Infusions:  No current facility-administered medications for this visit.   PRN Meds:.    I have reviewed the patient's medical history in detail and updated the computerized patient record.  Review and summarization of old records include:    Past Medical History:   Diagnosis Date   • Atrial fibrillation (CMS/HCC)    • Atrial flutter (CMS/HCC)    • Diabetes (CMS/HCC)    • Gout    • Heart disease     Status post pacemaker implantation   • Hemochromatosis    • History of syncope    • Hyperlipidemia    • Malignant neoplasm of prostate (CMS/HCC) 2018   • RBBB (right bundle  "branch block)    • Sleep apnea    • SSS (sick sinus syndrome) (CMS/HCC)    • SVT (supraventricular tachycardia) (CMS/HCC)         Past Surgical History:   Procedure Laterality Date   • CARDIAC ABLATION  2010    Paroxysmal atrial fib/Dr. Koo   • CARDIAC ABLATION  2012    Recurrent atrial flutter/Dr. Koo   • CARDIAC CATHETERIZATION     • CARDIAC PACEMAKER PLACEMENT  2014    Medtronic dual chamber/Dr. Manriquez   • KNEE SURGERY Right    • PACEMAKER IMPLANTATION  05/2006        Social History     Occupational History     Employer: RETIRED   Tobacco Use   • Smoking status: Former Smoker     Types: Cigars   • Smokeless tobacco: Never Used   Substance and Sexual Activity   • Alcohol use: Yes     Comment: Moderate \"few beers daily\"   • Drug use: No   • Sexual activity: Defer      Social History     Social History Narrative   • Not on file        Family History   Problem Relation Age of Onset   • Cancer Other         LYMPH & BREAST   • Diabetes Other    • Hypertension Other    • Heart disease Other    • Breast cancer Mother    • Lymphoma Father    • Hemochromatosis Father        ROS: 14 point review of systems was performed and was negative except for documented findings in HPI and today's encounter.     Allergies: No Known Allergies  Constitutional:  Denies fever, shaking or chills   Eyes:  Denies change in visual acuity   HENT:  Denies nasal congestion or sore throat   Respiratory:  Denies cough or shortness of breath   Cardiovascular:  Denies chest pain or severe LE edema   GI:  Denies abdominal pain, nausea, vomiting, bloody stools or diarrhea   Musculoskeletal:  Numbness, tingling, or loss of motor function only as noted above in history of present illness.  : Denies painful urination or hematuria  Integument:  Denies rash, lesion or ulceration   Neurologic:  Denies headache or focal weakness  Endocrine:  Denies lymphadenopathy  Psych:  Denies confusion or change in mental status   Hem:  Denies active " "bleeding    Physical Exam: 69 y.o. male  Wt Readings from Last 3 Encounters:   01/17/20 91.2 kg (201 lb)   11/12/19 91.5 kg (201 lb 11.2 oz)   10/16/19 92.3 kg (203 lb 6.4 oz)     Ht Readings from Last 1 Encounters:   01/17/20 170.2 cm (67\")     Body mass index is 31.48 kg/m².  Vitals:    01/17/20 1019   Temp: 98.3 °F (36.8 °C)     Vital signs reviewed.   Constitutional: Awake alert and oriented x3, well developed, no acute distress, non-toxic appearance.  EYES: symmetric, sclera clear  ENT:  Normocephalic, Atraumatic.   Respiratory:  No respiratory distress, No wheezing  CV: pulse regular, no palpitations or pallor.  GI:  Abdomen soft, non-tender.   Vascular:  Intact distal pulses, No cyanosis, no signs or symptoms of DVT.  Neurologic: Sensation grossly intact to the involved extremity, No focal deficits noted.   Neck: No tenderness, Supple.  Integument: warm, dry, no ulcerations.   Psychiatric:  Oriented, no pathological affect.  Musculoskeletal:    Affected knee(s):  Painful gait with a subtle limp, positive for synovitis, swelling, joint effusion with crepitation.  Lachman negative  Posterior drawer negative  Tereza's negative  Patellofemoral grind +  Sensation grossly intact to light touch throughout the lower extremity  Skin is intact  Distal pulses are palpable  No signs or symptoms of DVT        Diagnostic Data:     Imaging was done previously in the office, images were personally viewed and discussed with the patient:    Indication: pain related symptoms,  Views: 3V AP, LAT & 40 degree PA left knee(s)   Findings: severe end-stage arthritis (bone on bone, subchondral sclerosis/cysts, osteophytes)  Comparison views: viewed last xray done in the office.     Procedure:  Large Joint Arthrocentesis: L knee  Date/Time: 1/17/2020 7:59 AM  Consent given by: patient  Site marked: site marked  Timeout: Immediately prior to procedure a time out was called to verify the correct patient, procedure, equipment, support " staff and site/side marked as required   Supporting Documentation  Indications: pain   Procedure Details  Location: knee - L knee  Preparation: Patient was prepped and draped in the usual sterile fashion  Needle gauge: 21.  Approach: anterolateral  Medications administered: 4 mL lidocaine (cardiac); 80 mg methylPREDNISolone acetate 80 MG/ML  Patient tolerance: patient tolerated the procedure well with no immediate complications          Assessment:     ICD-10-CM ICD-9-CM   1. Arthritis of left knee M17.12 716.96           Plan: Is to proceed with injection  Follow up as indicated.  Ice, elevate, and rest as needed.  Additional interventions include:  15 min spent face to face with patient 11 min spent counseling about:  Biomechanics of pertinent body area discussed.  Risks, benefits, alternatives, comparisons, and complications of accepted medicines, injections, recommendations, surgical procedures, and therapies explained and education provided in laymen's terms. Natural history and expected course of this patient's diagnosis discussed along with evaluation of therapies. Questions answered. When appropriate I also discussed proper use of cane, walker, trekking poles.   BMI:  The concept of BMI body mass index and its importance and implications discussed.  BMI suggested to be < 40 or as low as possible. Lifestyle measures for weight loss and how this affects orthopedic condition.  EXERCISES:  Advice on benefits of, and types of regular/moderate exercise including biomechanical forces involved as it pertains to this complaint.  RICE: Rest, ice, compression, and elevation therapy, Cryotherapy/brachy therapy, and or OTC linaments as indicated with instructions.   Cortisone Injection. See procedure note.    1/17/2020  Jennifer Vargas, APRN

## 2020-03-03 ENCOUNTER — LAB (OUTPATIENT)
Dept: LAB | Facility: HOSPITAL | Age: 70
End: 2020-03-03

## 2020-03-03 ENCOUNTER — INFUSION (OUTPATIENT)
Dept: ONCOLOGY | Facility: HOSPITAL | Age: 70
End: 2020-03-03

## 2020-03-03 ENCOUNTER — OFFICE VISIT (OUTPATIENT)
Dept: ONCOLOGY | Facility: CLINIC | Age: 70
End: 2020-03-03

## 2020-03-03 VITALS
DIASTOLIC BLOOD PRESSURE: 94 MMHG | OXYGEN SATURATION: 99 % | SYSTOLIC BLOOD PRESSURE: 166 MMHG | TEMPERATURE: 97.9 F | HEIGHT: 67 IN | WEIGHT: 199.5 LBS | HEART RATE: 83 BPM | BODY MASS INDEX: 31.31 KG/M2

## 2020-03-03 DIAGNOSIS — E83.110 HEREDITARY HEMOCHROMATOSIS (HCC): Primary | ICD-10-CM

## 2020-03-03 DIAGNOSIS — E83.110 HEREDITARY HEMOCHROMATOSIS (HCC): ICD-10-CM

## 2020-03-03 LAB
BASOPHILS # BLD AUTO: 0.07 10*3/MM3 (ref 0–0.2)
BASOPHILS NFR BLD AUTO: 1 % (ref 0–1.5)
DEPRECATED RDW RBC AUTO: 47.2 FL (ref 37–54)
EOSINOPHIL # BLD AUTO: 0.53 10*3/MM3 (ref 0–0.4)
EOSINOPHIL NFR BLD AUTO: 7.3 % (ref 0.3–6.2)
ERYTHROCYTE [DISTWIDTH] IN BLOOD BY AUTOMATED COUNT: 12.7 % (ref 12.3–15.4)
FERRITIN SERPL-MCNC: 99.9 NG/ML (ref 30–400)
HCT VFR BLD AUTO: 43.6 % (ref 37.5–51)
HGB BLD-MCNC: 14.7 G/DL (ref 13–17.7)
IMM GRANULOCYTES # BLD AUTO: 0.02 10*3/MM3 (ref 0–0.05)
IMM GRANULOCYTES NFR BLD AUTO: 0.3 % (ref 0–0.5)
LYMPHOCYTES # BLD AUTO: 1.83 10*3/MM3 (ref 0.7–3.1)
LYMPHOCYTES NFR BLD AUTO: 25.1 % (ref 19.6–45.3)
MCH RBC QN AUTO: 34 PG (ref 26.6–33)
MCHC RBC AUTO-ENTMCNC: 33.7 G/DL (ref 31.5–35.7)
MCV RBC AUTO: 100.9 FL (ref 79–97)
MONOCYTES # BLD AUTO: 0.74 10*3/MM3 (ref 0.1–0.9)
MONOCYTES NFR BLD AUTO: 10.2 % (ref 5–12)
NEUTROPHILS # BLD AUTO: 4.1 10*3/MM3 (ref 1.7–7)
NEUTROPHILS NFR BLD AUTO: 56.1 % (ref 42.7–76)
NRBC BLD AUTO-RTO: 0 /100 WBC (ref 0–0.2)
PLATELET # BLD AUTO: 207 10*3/MM3 (ref 140–450)
PMV BLD AUTO: 9 FL (ref 6–12)
RBC # BLD AUTO: 4.32 10*6/MM3 (ref 4.14–5.8)
WBC NRBC COR # BLD: 7.29 10*3/MM3 (ref 3.4–10.8)

## 2020-03-03 PROCEDURE — 99213 OFFICE O/P EST LOW 20 MIN: CPT | Performed by: NURSE PRACTITIONER

## 2020-03-03 PROCEDURE — 82728 ASSAY OF FERRITIN: CPT

## 2020-03-03 PROCEDURE — 85025 COMPLETE CBC W/AUTO DIFF WBC: CPT

## 2020-03-03 PROCEDURE — 36415 COLL VENOUS BLD VENIPUNCTURE: CPT

## 2020-03-03 NOTE — PROGRESS NOTES
Pt was possible phlebo with ferritin at 99.9 today. Pt declines to stay for phleb. Pt aware of schedule and told to f/u with any questions or concerns.

## 2020-03-03 NOTE — PROGRESS NOTES
REASONS FOR FOLLOWUP:    Hemochromatosis    HISTORY OF PRESENT ILLNESS:  The patient is a 69 y.o. year old male formally seen by Dr. Aquino in this office, with hemochromatosis on intermittent phlebotomy the patient has been undergoing phlebotomy roughly every 3 months during 2019.  His last two ferritin measurements were in the 90s.  Ferritin level today is 80.      Patient is antsy to leave today and did not want to proceed with phlebotomy if his ferritin is under 100.  He has changed from Xarelto to warfarin for his atrial fibrillation anticoagulation due to cost.  He reports his INR is therapeutic and last checked at his cardiologist office last week.  He denies any known blood clots or recent bleeding.    He reports not having followed up with primary care recently and is unsure how controlled his diabetes is currently.      Past Medical History:   Diagnosis Date   • Atrial fibrillation (CMS/HCC)    • Atrial flutter (CMS/HCC)    • Diabetes (CMS/HCC)    • Gout    • Heart disease     Status post pacemaker implantation   • Hemochromatosis    • History of syncope    • Hyperlipidemia    • Malignant neoplasm of prostate (CMS/HCC) 2018   • RBBB (right bundle branch block)    • Sleep apnea    • SSS (sick sinus syndrome) (CMS/HCC)    • SVT (supraventricular tachycardia) (CMS/HCC)      Past Surgical History:   Procedure Laterality Date   • CARDIAC ABLATION  2010    Paroxysmal atrial fib/Dr. Koo   • CARDIAC ABLATION  2012    Recurrent atrial flutter/Dr. Koo   • CARDIAC CATHETERIZATION     • CARDIAC PACEMAKER PLACEMENT  2014    Medtronic dual chamber/Dr. Manriquez   • KNEE SURGERY Right    • PACEMAKER IMPLANTATION  05/2006       HEMATOLOGIC/ONCOLOGIC HISTORY:  (History from previous dates can be found in the separate document.)    MEDICATIONS    Current Outpatient Medications:   •  allopurinol (ZYLOPRIM) 300 MG tablet, Take 300 mg by mouth Daily., Disp: , Rfl:   •  atorvastatin (LIPITOR) 40 MG tablet, Take 40 mg by mouth  "Daily., Disp: , Rfl:   •  lisinopril (PRINIVIL,ZESTRIL) 10 MG tablet, Take 10 mg by mouth Daily., Disp: , Rfl:   •  metFORMIN (GLUCOPHAGE) 500 MG tablet, Take 500 mg by mouth Daily With Breakfast., Disp: , Rfl:   •  metoprolol succinate XL (TOPROL-XL) 25 MG 24 hr tablet, Take 25 mg by mouth Daily., Disp: , Rfl:   •  warfarin (COUMADIN) 5 MG tablet, , Disp: , Rfl:     ALLERGIES:   No Known Allergies    SOCIAL HISTORY:       Social History     Socioeconomic History   • Marital status:      Spouse name: Suzie   • Number of children: Not on file   • Years of education: High School   • Highest education level: Not on file   Occupational History     Employer: RETIRED   Tobacco Use   • Smoking status: Former Smoker     Types: Cigars   • Smokeless tobacco: Never Used   Substance and Sexual Activity   • Alcohol use: Yes     Comment: Moderate \"few beers daily\"   • Drug use: No   • Sexual activity: Defer         FAMILY HISTORY:  Family History   Problem Relation Age of Onset   • Cancer Other         LYMPH & BREAST   • Diabetes Other    • Hypertension Other    • Heart disease Other    • Breast cancer Mother    • Lymphoma Father    • Hemochromatosis Father        REVIEW OF SYSTEMS:  GENERAL: No change in appetite or weight;   No fevers, chills, sweats.    SKIN: No nonhealing lesions.   No rashes.  HEME/LYMPH: No swollen nodes.   EYES: No vision changes or diplopia.   ENT: No tinnitus, hearing loss, gum bleeding, epistaxis, hoarseness or dysphagia.   RESPIRATORY: No cough, shortness of breath, hemoptysis or wheezing.   CVS: No chest pain, palpitations, orthopnea, dyspnea on exertion or PND.   GI: No melena or hematochezia.   No abdominal pain.  No nausea, vomiting, constipation, diarrhea  : No dysuria or hematuria.   MUSCULOSKELETAL: Chronic arthralgias.  NEUROLOGICAL: No global weakness, loss of consciousness or seizures.     Objective    Vitals:    03/03/20 1152   BP: 166/94   Pulse: 83   Temp: 97.9 °F (36.6 °C) " "  SpO2: 99%   Weight: 90.5 kg (199 lb 8 oz)   Height: 170.2 cm (67.01\")     Current Status 3/3/2020   ECOG score 0       PHYSICAL EXAM:    GENERAL:  Well-developed, well-nourished in no acute distress.   SKIN:  Warm, dry without rashes, purpura or petechiae.  EYES:  Pupils equal, round and reactive to light.  EOMs intact.  Conjunctivae normal.  EARS:  Hearing intact.  NOSE:  Septum midline.  No excoriations or nasal discharge.  MOUTH:  Tongue is well-papillated; no stomatitis or ulcers.  Lips normal.  THROAT:  Oropharynx without lesions or exudates.  NECK:  Supple with good range of motion; no thyromegaly or masses, no JVD.  LYMPHATICS:  No cervical, supraclavicular, axillary or inguinal adenopathy.  CHEST:  Lungs clear to auscultation. Good airflow.  CARDIAC:  Regular rate and rhythm without murmurs, rubs or gallops. Normal S1,S2.  EXTREMITIES:  No clubbing, cyanosis or edema.  NEUROLOGICAL:  No focal neurological deficits.  PSYCHIATRIC:  Normal affect and mood.       RECENT LABS:  Results from last 7 days   Lab Units 03/03/20  1139   WBC 10*3/mm3 7.29   NEUTROS ABS 10*3/mm3 4.10   HEMOGLOBIN g/dL 14.7   HEMATOCRIT % 43.6   PLATELETS 10*3/mm3 207                 Assessment/Plan    ASSESSMENT:  1.Hereditary hemochromatosis: Last phlebo was 5/15/2019.  The patient did not wish to stay for phlebotomy today since his ferritin was under 100.  This resulted after he was seen in the exam room.  2.  Cigar use-discussed smoking cessation  3.  Atrial fibrillation on Xarelto, recently switched to warfarin  4.  Sick sinus syndrome    PLAN:  1.  Patient will return to the office in 4 months for stat ferritin and CBC with possible phlebotomy.  2.   Goal is to keep ferritin  .  3.  Follow-up with Dr. Morales in October.                        "

## 2020-06-02 ENCOUNTER — INFUSION (OUTPATIENT)
Dept: ONCOLOGY | Facility: HOSPITAL | Age: 70
End: 2020-06-02

## 2020-06-02 ENCOUNTER — LAB (OUTPATIENT)
Dept: LAB | Facility: HOSPITAL | Age: 70
End: 2020-06-02

## 2020-06-02 VITALS
OXYGEN SATURATION: 96 % | HEART RATE: 87 BPM | DIASTOLIC BLOOD PRESSURE: 79 MMHG | BODY MASS INDEX: 31.63 KG/M2 | WEIGHT: 202 LBS | TEMPERATURE: 98.1 F | SYSTOLIC BLOOD PRESSURE: 139 MMHG

## 2020-06-02 DIAGNOSIS — E83.110 HEREDITARY HEMOCHROMATOSIS (HCC): Primary | ICD-10-CM

## 2020-06-02 LAB
BASOPHILS # BLD AUTO: 0.06 10*3/MM3 (ref 0–0.2)
BASOPHILS NFR BLD AUTO: 0.8 % (ref 0–1.5)
DEPRECATED RDW RBC AUTO: 46.8 FL (ref 37–54)
EOSINOPHIL # BLD AUTO: 0.51 10*3/MM3 (ref 0–0.4)
EOSINOPHIL NFR BLD AUTO: 6.6 % (ref 0.3–6.2)
ERYTHROCYTE [DISTWIDTH] IN BLOOD BY AUTOMATED COUNT: 12.7 % (ref 12.3–15.4)
FERRITIN SERPL-MCNC: 143.2 NG/ML (ref 30–400)
HCT VFR BLD AUTO: 43 % (ref 37.5–51)
HGB BLD-MCNC: 14.5 G/DL (ref 13–17.7)
IMM GRANULOCYTES # BLD AUTO: 0.01 10*3/MM3 (ref 0–0.05)
IMM GRANULOCYTES NFR BLD AUTO: 0.1 % (ref 0–0.5)
LYMPHOCYTES # BLD AUTO: 1.97 10*3/MM3 (ref 0.7–3.1)
LYMPHOCYTES NFR BLD AUTO: 25.4 % (ref 19.6–45.3)
MCH RBC QN AUTO: 34 PG (ref 26.6–33)
MCHC RBC AUTO-ENTMCNC: 33.7 G/DL (ref 31.5–35.7)
MCV RBC AUTO: 100.7 FL (ref 79–97)
MONOCYTES # BLD AUTO: 0.61 10*3/MM3 (ref 0.1–0.9)
MONOCYTES NFR BLD AUTO: 7.9 % (ref 5–12)
NEUTROPHILS # BLD AUTO: 4.6 10*3/MM3 (ref 1.7–7)
NEUTROPHILS NFR BLD AUTO: 59.2 % (ref 42.7–76)
NRBC BLD AUTO-RTO: 0 /100 WBC (ref 0–0.2)
PLATELET # BLD AUTO: 210 10*3/MM3 (ref 140–450)
PMV BLD AUTO: 9.1 FL (ref 6–12)
RBC # BLD AUTO: 4.27 10*6/MM3 (ref 4.14–5.8)
WBC NRBC COR # BLD: 7.76 10*3/MM3 (ref 3.4–10.8)

## 2020-06-02 PROCEDURE — 82728 ASSAY OF FERRITIN: CPT

## 2020-06-02 PROCEDURE — 85025 COMPLETE CBC W/AUTO DIFF WBC: CPT

## 2020-06-02 PROCEDURE — 36415 COLL VENOUS BLD VENIPUNCTURE: CPT

## 2020-06-02 PROCEDURE — 99195 PHLEBOTOMY: CPT

## 2020-06-02 RX ORDER — SODIUM CHLORIDE 9 MG/ML
250 INJECTION, SOLUTION INTRAVENOUS ONCE
Status: CANCELLED | OUTPATIENT
Start: 2020-06-02

## 2020-06-02 RX ORDER — SODIUM CHLORIDE 9 MG/ML
250 INJECTION, SOLUTION INTRAVENOUS ONCE
Status: DISCONTINUED | OUTPATIENT
Start: 2020-06-02 | End: 2020-06-02 | Stop reason: HOSPADM

## 2020-07-01 ENCOUNTER — CLINICAL SUPPORT (OUTPATIENT)
Dept: ORTHOPEDIC SURGERY | Facility: CLINIC | Age: 70
End: 2020-07-01

## 2020-07-01 VITALS — HEIGHT: 67 IN | TEMPERATURE: 97.4 F | WEIGHT: 203 LBS | BODY MASS INDEX: 31.86 KG/M2

## 2020-07-01 DIAGNOSIS — M17.12 ARTHRITIS OF LEFT KNEE: Primary | ICD-10-CM

## 2020-07-01 PROCEDURE — 20610 DRAIN/INJ JOINT/BURSA W/O US: CPT | Performed by: ORTHOPAEDIC SURGERY

## 2020-07-01 PROCEDURE — 73562 X-RAY EXAM OF KNEE 3: CPT | Performed by: ORTHOPAEDIC SURGERY

## 2020-07-01 RX ORDER — METHYLPREDNISOLONE ACETATE 80 MG/ML
80 INJECTION, SUSPENSION INTRA-ARTICULAR; INTRALESIONAL; INTRAMUSCULAR; SOFT TISSUE
Status: COMPLETED | OUTPATIENT
Start: 2020-07-01 | End: 2020-07-01

## 2020-07-01 RX ADMIN — METHYLPREDNISOLONE ACETATE 80 MG: 80 INJECTION, SUSPENSION INTRA-ARTICULAR; INTRALESIONAL; INTRAMUSCULAR; SOFT TISSUE at 08:14

## 2020-07-01 NOTE — PROGRESS NOTES
Patient Name: Loyd Avalos   YOB: 1950  Referring Primary Care Physician: Ari Lopez Jr., MD  BMI: Body mass index is 31.79 kg/m².    Chief Complaint:    Chief Complaint   Patient presents with   • Left Knee - Follow-up        HPI:     Loyd Avalos is a 69 y.o. male who presents today for evaluation of   Chief Complaint   Patient presents with   • Left Knee - Follow-up   .  Patient seen back today with his recurrent acute on chronic left knee pain.  Is hard for him to walk.  Went over different treatment options with him.  He has a lot of comorbidities including cardiac morbidities and he is currently on Coumadin and eyes had an ablation in the past but that his arrhythmia came back and he did really stay a rather stay away from surgery.    This problem not new to this examiner.     Subjective   Medications:   Home Medications:  Current Outpatient Medications on File Prior to Visit   Medication Sig   • allopurinol (ZYLOPRIM) 300 MG tablet Take 300 mg by mouth Daily.   • atorvastatin (LIPITOR) 40 MG tablet Take 40 mg by mouth Daily.   • lisinopril (PRINIVIL,ZESTRIL) 10 MG tablet Take 10 mg by mouth Daily.   • metFORMIN (GLUCOPHAGE) 500 MG tablet Take 500 mg by mouth Daily With Breakfast.   • metoprolol succinate XL (TOPROL-XL) 25 MG 24 hr tablet Take 25 mg by mouth Daily.   • warfarin (COUMADIN) 5 MG tablet      No current facility-administered medications on file prior to visit.      Current Medications:  Scheduled Meds:  Continuous Infusions:  No current facility-administered medications for this visit.   PRN Meds:.    I have reviewed the patient's medical history in detail and updated the computerized patient record.  Review and summarization of old records includes:    Past Medical History:   Diagnosis Date   • Atrial fibrillation (CMS/HCC)    • Atrial flutter (CMS/HCC)    • Diabetes (CMS/HCC)    • Gout    • Heart disease     Status post pacemaker implantation   • Hemochromatosis  "   • History of syncope    • Hyperlipidemia    • Malignant neoplasm of prostate (CMS/HCC) 2018   • RBBB (right bundle branch block)    • Sleep apnea    • SSS (sick sinus syndrome) (CMS/HCC)    • SVT (supraventricular tachycardia) (CMS/[facility])         Past Surgical History:   Procedure Laterality Date   • CARDIAC ABLATION  2010    Paroxysmal atrial fib/Dr. Koo   • CARDIAC ABLATION  2012    Recurrent atrial flutter/Dr. Koo   • CARDIAC CATHETERIZATION     • CARDIAC PACEMAKER PLACEMENT  2014    Medtronic dual chamber/Dr. Manriquez   • KNEE SURGERY Right    • PACEMAKER IMPLANTATION  05/2006        Social History     Occupational History     Employer: RETIRED   Tobacco Use   • Smoking status: Former Smoker     Types: Cigars   • Smokeless tobacco: Never Used   Substance and Sexual Activity   • Alcohol use: Yes     Comment: Moderate \"few beers daily\"   • Drug use: No   • Sexual activity: Defer      Social History     Social History Narrative   • Not on file        Family History   Problem Relation Age of Onset   • Cancer Other         LYMPH & BREAST   • Diabetes Other    • Hypertension Other    • Heart disease Other    • Breast cancer Mother    • Lymphoma Father    • Hemochromatosis Father        ROS: 14 point review of systems was performed and all other systems were reviewed and are negative except for documented findings in HPI and today's encounter.     Allergies: No Known Allergies  Constitutional:  Denies fever, shaking or chills   Eyes:  Denies change in visual acuity   HENT:  Denies nasal congestion or sore throat   Respiratory:  Denies cough or shortness of breath   Cardiovascular:  Denies chest pain or severe LE edema   GI:  Denies abdominal pain, nausea, vomiting, bloody stools or diarrhea   Musculoskeletal:  Numbness, tingling, pain, or loss of motor function only as noted above in history of present illness.  : Denies painful urination or hematuria  Integument:  Denies rash, lesion or ulceration   Neurologic:  " "Denies headache or focal weakness  Endocrine:  Denies lymphadenopathy  Psych:  Denies confusion or change in mental status   Hem:  Denies active bleeding    OBJECTIVE:  Physical Exam: 69 y.o. male  Wt Readings from Last 3 Encounters:   07/01/20 92.1 kg (203 lb)   06/02/20 91.6 kg (202 lb)   03/03/20 90.5 kg (199 lb 8 oz)     Ht Readings from Last 1 Encounters:   07/01/20 170.2 cm (67\")     Body mass index is 31.79 kg/m².  Vitals:    07/01/20 1556   Temp: 97.4 °F (36.3 °C)     Vital signs reviewed.     General Appearance:    Alert, cooperative, in no acute distress                  Eyes: conjunctiva clear  ENT: external ears and nose atraumatic  CV: no peripheral edema  Resp: normal respiratory effort  Skin: no rashes or wounds; normal turgor  Psych: mood and affect appropriate  Lymph: no nodes appreciated  Neuro: gross sensation intact  Vascular:  Palpable peripheral pulse in noted extremity  Musculoskeletal Extremities: He is crepitation synovitis swelling Baker's cyst slight start up limp medial joint line tenderness on the left    Radiology:   AP lateral 40 agree PA x-ray left knee with comparison view shows arthritis    Assessment:     ICD-10-CM ICD-9-CM   1. Arthritis of left knee M17.12 716.96        Large Joint Arthrocentesis: L knee  Date/Time: 7/1/2020 8:14 AM  Consent given by: patient  Site marked: site marked  Timeout: Immediately prior to procedure a time out was called to verify the correct patient, procedure, equipment, support staff and site/side marked as required   Supporting Documentation  Indications: pain   Procedure Details  Location: knee - L knee  Preparation: Patient was prepped and draped in the usual sterile fashion  Needle gauge: 21.  Approach: anterolateral  Medications administered: 4 mL lidocaine (cardiac); 80 mg methylPREDNISolone acetate 80 MG/ML  Patient tolerance: patient tolerated the procedure well with no immediate complications             Plan: EXERCISES:  Advice on benefits " of, and types of regular/moderate exercise including biomechanical forces involved as it pertains to this complaint.  RICE: Rest, ice, compression, and elevation therapy, Cryotherapy/brachy therapy, and or OTC linaments as indicated with instructions.   Cortisone Injection. See procedure note.      7/1/2020    Much of this encounter note is an electronic transcription/translation of spoken language to printed text. The electronic translation of spoken language may permit erroneous, or at times, nonsensical words or phrases to be inadvertently transcribed; Although I have reviewed the note for such errors, some may still exist

## 2020-09-06 ENCOUNTER — APPOINTMENT (OUTPATIENT)
Dept: GENERAL RADIOLOGY | Facility: HOSPITAL | Age: 70
End: 2020-09-06

## 2020-09-06 ENCOUNTER — HOSPITAL ENCOUNTER (EMERGENCY)
Facility: HOSPITAL | Age: 70
Discharge: HOME | End: 2020-09-07
Attending: EMERGENCY MEDICINE | Admitting: EMERGENCY MEDICINE

## 2020-09-06 ENCOUNTER — APPOINTMENT (OUTPATIENT)
Dept: CT IMAGING | Facility: HOSPITAL | Age: 70
End: 2020-09-06

## 2020-09-06 DIAGNOSIS — S82.832A CLOSED FRACTURE OF DISTAL END OF LEFT FIBULA, UNSPECIFIED FRACTURE MORPHOLOGY, INITIAL ENCOUNTER: Primary | ICD-10-CM
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
## 2020-09-06 PROCEDURE — 70450 CT HEAD/BRAIN W/O DYE: CPT

## 2020-09-06 PROCEDURE — 73562 X-RAY EXAM OF KNEE 3: CPT

## 2020-09-06 PROCEDURE — 73610 X-RAY EXAM OF ANKLE: CPT

## 2020-09-06 PROCEDURE — 99284 EMERGENCY DEPT VISIT MOD MDM: CPT

## 2020-09-07 VITALS
OXYGEN SATURATION: 96 % | BODY MASS INDEX: 31.39 KG/M2 | TEMPERATURE: 98.6 F | HEIGHT: 67 IN | RESPIRATION RATE: 16 BRPM | SYSTOLIC BLOOD PRESSURE: 118 MMHG | DIASTOLIC BLOOD PRESSURE: 72 MMHG | WEIGHT: 200 LBS | HEART RATE: 92 BPM

## 2020-09-07 RX ORDER — HYDROCODONE BITARTRATE AND ACETAMINOPHEN 7.5; 325 MG/1; MG/1
1 TABLET ORAL EVERY 4 HOURS PRN
Qty: 30 TABLET | Refills: 0 | Status: SHIPPED | OUTPATIENT
Start: 2020-09-07 | End: 2021-05-05

## 2020-09-07 NOTE — ED PROVIDER NOTES
EMERGENCY DEPARTMENT ENCOUNTER    CHIEF COMPLAINT  Chief Complaint: Leg Tenorio   History given by: Patient  History limited by:   Room Number: 13/13  PMD: Ari Lopez Jr., MD  Orthopedist: Dr. Sánchez    HPI:  Pt is a 70 y.o. male who presents complaining of L leg pain s/p mechanical fall that occurred today. He reports tripping over a weed eater which caused him to fall. Pt reports that pain is most severe in the L knee and L ankle. Pt denies any hip pain or abd pain. He denies striking his head or any LOC. He is on Coumadin for his hx of Afib. Pt also reports a hx of arthritis of the L knee and ankle.    Patient was placed in face mask in first look. Patient was wearing facemask when I entered the room and throughout our encounter. I wore full protective equipment throughout this patient encounter including a face mask, and gloves. Hand hygiene was performed before donning protective equipment and after removal when leaving the room.      PAST MEDICAL HISTORY  Active Ambulatory Problems     Diagnosis Date Noted   • Primary osteoarthritis of left knee 03/31/2016   • Hemochromatosis 05/12/2016   • Arthritis of left knee 08/05/2016   • Chronic pain of left knee 08/03/2017     Resolved Ambulatory Problems     Diagnosis Date Noted   • No Resolved Ambulatory Problems     Past Medical History:   Diagnosis Date   • Atrial fibrillation (CMS/HCC)    • Atrial flutter (CMS/HCC)    • Diabetes (CMS/HCC)    • Gout    • Heart disease    • History of syncope    • Hyperlipidemia    • Malignant neoplasm of prostate (CMS/HCC) 2018   • RBBB (right bundle branch block)    • Sleep apnea    • SSS (sick sinus syndrome) (CMS/HCC)    • SVT (supraventricular tachycardia) (CMS/HCC)        PAST SURGICAL HISTORY  Past Surgical History:   Procedure Laterality Date   • CARDIAC ABLATION  2010    Paroxysmal atrial fib/Dr. Koo   • CARDIAC ABLATION  2012    Recurrent atrial flutter/Dr. Koo   • CARDIAC CATHETERIZATION     • CARDIAC PACEMAKER PLACEMENT   "2014    Medtronic dual chamber/Dr. Manriquez   • KNEE SURGERY Right    • PACEMAKER IMPLANTATION  05/2006       FAMILY HISTORY  Family History   Problem Relation Age of Onset   • Cancer Other         LYMPH & BREAST   • Diabetes Other    • Hypertension Other    • Heart disease Other    • Breast cancer Mother    • Lymphoma Father    • Hemochromatosis Father        SOCIAL HISTORY  Social History     Socioeconomic History   • Marital status:      Spouse name: Suzie   • Number of children: Not on file   • Years of education: High School   • Highest education level: Not on file   Occupational History     Employer: RETIRED   Tobacco Use   • Smoking status: Former Smoker     Types: Cigars   • Smokeless tobacco: Never Used   Substance and Sexual Activity   • Alcohol use: Yes     Comment: Moderate \"few beers daily\"   • Drug use: No   • Sexual activity: Defer       ALLERGIES  Patient has no known allergies.    REVIEW OF SYSTEMS  Review of Systems   Constitutional: Negative for activity change, appetite change and fever.   HENT: Negative for congestion and sore throat.    Eyes: Negative.    Respiratory: Negative for cough and shortness of breath.    Cardiovascular: Negative for chest pain and leg swelling.   Gastrointestinal: Negative for abdominal pain, diarrhea and vomiting.   Endocrine: Negative.    Genitourinary: Negative for decreased urine volume and dysuria.   Musculoskeletal: Positive for arthralgias (L ankle, L knee). Negative for neck pain.   Skin: Negative for rash and wound.   Allergic/Immunologic: Negative.    Neurological: Negative for weakness, numbness and headaches.   Hematological: Negative.    Psychiatric/Behavioral: Negative.    All other systems reviewed and are negative.      PHYSICAL EXAM  ED Triage Vitals [09/06/20 2212]   Temp Heart Rate Resp BP SpO2   98.6 °F (37 °C) 97 15 115/78 97 %      Temp src Heart Rate Source Patient Position BP Location FiO2 (%)   Tympanic Monitor -- -- --       Physical " Exam   Constitutional: He is oriented to person, place, and time. No distress.   HENT:   Head: Normocephalic and atraumatic.   Eyes: Pupils are equal, round, and reactive to light. EOM are normal.   Neck: Normal range of motion. Neck supple.   Cardiovascular: Normal rate, regular rhythm and normal heart sounds.   Pulmonary/Chest: Effort normal and breath sounds normal. No respiratory distress.   Abdominal: Soft. There is no tenderness. There is no rebound and no guarding.   Musculoskeletal: Normal range of motion. He exhibits no edema.        Left hip: Normal.        Left knee: He exhibits no deformity. Tenderness (with abrasion) found.        Left ankle: He exhibits no deformity. Tenderness. Lateral malleolus tenderness found.   Neurological: He is alert and oriented to person, place, and time. He has normal sensation and normal strength.   Skin: Skin is warm and dry.   Psychiatric: Mood and affect normal.   Nursing note and vitals reviewed.      RADIOLOGY  XR Ankle 3+ View Left   Final Result       1. Obliquely oriented distal fibular diametaphyseal fracture. There is   also some age-indeterminate fragmentation of the tip of the medial   malleolus. On these images, it appears to be corticated, and may reflect   prior avulsion injury, although acute injury cannot be excluded. CT   could be considered for additional evaluation. Ankle mortise does appear   intact.       This report was finalized on 9/7/2020 12:27 AM by Dr. Leora Davis M.D.          XR Knee 3 View Left   Final Result   No acute fracture or subluxation, although the patient does appear to   have a suprapatellar effusion.       This report was finalized on 9/7/2020 12:15 AM by Dr. Leora Davis M.D.          CT Head Without Contrast   Final Result   No acute intracranial findings.       Radiation dose reduction techniques were utilized, including automated   exposure control and exposure modulation based on body size.       This report was  finalized on 9/7/2020 12:14 AM by Dr. Leora Davis M.D.               I ordered the above noted radiological studies. Interpreted by radiologist. Reviewed by me in PACS.     PROCEDURES  Splint - Cast - Strapping  Date/Time: 9/7/2020 12:19 AM  Performed by: Reilly Dougherty MD  Authorized by: Reilly Dougherty MD     Consent:     Consent obtained:  Verbal    Consent given by:  Patient  Pre-procedure details:     Sensation:  Normal    Skin color:  Pink  Procedure details:     Laterality:  Left    Location:  Ankle    Ankle:  L ankle    Splint type:  CAM walker boot    Supplies:  Prefabricated splint  Post-procedure details:     Pain:  Improved    Sensation:  Normal    Skin color:  Pink    Patient tolerance of procedure:  Tolerated well, no immediate complications        PROGRESS AND CONSULTS      11:35 PM  Pt originally declined having head CT performed. I discussed the risks associated with taking anticoagulation and trauma. He agrees with plan for head CT at this time. XR L knee and ankle ordered.    12:17 AM  Rechecked with pt. I discussed with pt about his imaging results showing a distal fibula fx. Informed the pt of plan to place in a walking boot and need to follow-up with orthopedist. Pt understands and agrees with plan. All concerns were addressed.      MEDICAL DECISION MAKING  Results were reviewed/discussed with the patient and they were also made aware of online access. Pt also made aware that some labs, such as cultures, will not be resulted during ER visit and follow up with PMD is necessary.     MDM  Number of Diagnoses or Management Options  Closed fracture of distal end of left fibula, unspecified fracture morphology, initial encounter:   Fall, initial encounter:      Amount and/or Complexity of Data Reviewed  Tests in the radiology section of CPT®: reviewed and ordered (L ankle: nondisplaced L fibula fx)  Review and summarize past medical records: yes (No recent ED visits)           DIAGNOSIS  Final  diagnoses:   Closed fracture of distal end of left fibula, unspecified fracture morphology, initial encounter   Fall, initial encounter       DISPOSITION  DISCHARGE    Patient discharged in stable condition.    Reviewed implications of results, diagnosis, meds, responsibility to follow up, warning signs and symptoms of possible worsening, potential complications and reasons to return to ER.    Patient/Family voiced understanding of above instructions.    Discussed plan for discharge, as there is no emergent indication for admission. Patient referred to primary care provider for BP management due to today's BP. Pt/family is agreeable and understands need for follow up and repeat testing.  Pt is aware that discharge does not mean that nothing is wrong but it indicates no emergency is present that requires admission and they must continue care with follow-up as given below or physician of their choice.     FOLLOW-UP  Reno Siegel II, MD  [address] [address]  [phone]    Schedule an appointment as soon as possible for a visit  for follow up of nondisplaced left fibula fracture         Medication List      New Prescriptions    HYDROcodone-acetaminophen 7.5-325 MG per tablet  Commonly known as:  NORCO  Take 1 tablet by mouth Every 4 (Four) Hours As Needed for Moderate Pain .              Latest Documented Vital Signs:  As of 05:24  BP- 118/72 HR- 92 Temp- 98.6 °F (37 °C) (Tympanic) O2 sat- 96%    Documentation assistance provided by cecile Mills for Dr Dougherty.  Information recorded by the scribe was done at my direction and has been verified and validated by me.       Presley Mills  09/07/20 0033       Reilly Dougherty MD  09/07/20 [id number]

## 2020-09-07 NOTE — ED TRIAGE NOTES
Pt reports tripping over a weed eater. Pt states that he currently has left knee and ankle pain s/p fall. Pt denies head injury and denies loc. Pt is on blood thinners (warfarin).     Pt placed in mask and staff wearing appropriate ppe at the time of pt arrival.

## 2020-10-13 ENCOUNTER — APPOINTMENT (OUTPATIENT)
Dept: LAB | Facility: HOSPITAL | Age: 70
End: 2020-10-13

## 2020-10-13 ENCOUNTER — APPOINTMENT (OUTPATIENT)
Dept: ONCOLOGY | Facility: HOSPITAL | Age: 70
End: 2020-10-13

## 2020-10-19 ENCOUNTER — OFFICE VISIT (OUTPATIENT)
Dept: ORTHOPEDIC SURGERY | Facility: CLINIC | Age: 70
End: 2020-10-19

## 2020-10-19 VITALS — BODY MASS INDEX: 32.18 KG/M2 | WEIGHT: 205 LBS | TEMPERATURE: 98.2 F | HEIGHT: 67 IN

## 2020-10-19 DIAGNOSIS — M17.12 ARTHRITIS OF LEFT KNEE: Primary | ICD-10-CM

## 2020-10-19 PROCEDURE — 99212 OFFICE O/P EST SF 10 MIN: CPT | Performed by: ORTHOPAEDIC SURGERY

## 2020-10-19 NOTE — PROGRESS NOTES
Patient Name: Loyd Avalos   YOB: 1950  Referring Primary Care Physician: Ari Lopez Jr., MD  BMI: Body mass index is 32.11 kg/m².    Chief Complaint:    Chief Complaint   Patient presents with   • Left Knee - Follow-up, Pain        HPI:     Loyd Avalos is a 70 y.o. male who presents today for evaluation of   Chief Complaint   Patient presents with   • Left Knee - Follow-up, Pain   .  Patient follows up today and says his left knee is doing pretty well for him he has arthritis in it she had injections in the past he is trying to keep pretty active.  He said in the past because of his cardiac abnormalities that he preferred to not have surgery although he says his arrhythmias have been fixed with a pacemaker      Subjective   Medications:   Home Medications:  Current Outpatient Medications on File Prior to Visit   Medication Sig   • allopurinol (ZYLOPRIM) 300 MG tablet Take 300 mg by mouth Daily.   • atorvastatin (LIPITOR) 40 MG tablet Take 40 mg by mouth Daily.   • HYDROcodone-acetaminophen (NORCO) 7.5-325 MG per tablet Take 1 tablet by mouth Every 4 (Four) Hours As Needed for Moderate Pain .   • lisinopril (PRINIVIL,ZESTRIL) 10 MG tablet Take 10 mg by mouth Daily.   • metFORMIN (GLUCOPHAGE) 500 MG tablet Take 500 mg by mouth Daily With Breakfast.   • metoprolol succinate XL (TOPROL-XL) 25 MG 24 hr tablet Take 25 mg by mouth Daily.   • Multiple Vitamins-Minerals (MULTIVITAMIN ADULT PO) Take  by mouth.   • warfarin (COUMADIN) 5 MG tablet Take 5 mg by mouth Daily.     No current facility-administered medications on file prior to visit.      Current Medications:  Scheduled Meds:  Continuous Infusions:No current facility-administered medications for this visit.     PRN Meds:.    I have reviewed the patient's medical history in detail and updated the computerized patient record.  Review and summarization of old records includes:    Past Medical History:   Diagnosis Date   • Atrial  "fibrillation (CMS/HCC)    • Atrial flutter (CMS/HCC)    • Diabetes (CMS/HCC)    • Gout    • Heart disease     Status post pacemaker implantation   • Hemochromatosis    • History of syncope    • Hyperlipidemia    • Malignant neoplasm of prostate (CMS/HCC) 2018   • RBBB (right bundle branch block)    • Sleep apnea    • SSS (sick sinus syndrome) (CMS/HCC)    • SVT (supraventricular tachycardia) (CMS/HCC)         Past Surgical History:   Procedure Laterality Date   • CARDIAC ABLATION  2010    Paroxysmal atrial fib/Dr. Koo   • CARDIAC ABLATION  2012    Recurrent atrial flutter/Dr. Koo   • CARDIAC CATHETERIZATION     • CARDIAC PACEMAKER PLACEMENT  2014    Medtronic dual chamber/Dr. Manriquez   • KNEE SURGERY Right    • PACEMAKER IMPLANTATION  05/2006        Social History     Occupational History     Employer: RETIRED   Tobacco Use   • Smoking status: Former Smoker     Types: Cigars   • Smokeless tobacco: Never Used   Substance and Sexual Activity   • Alcohol use: Yes     Comment: Moderate \"few beers daily\"   • Drug use: No   • Sexual activity: Defer      Social History     Social History Narrative   • Not on file        Family History   Problem Relation Age of Onset   • Cancer Other         LYMPH & BREAST   • Diabetes Other    • Hypertension Other    • Heart disease Other    • Breast cancer Mother    • Lymphoma Father    • Hemochromatosis Father        ROS: 14 point review of systems was performed and all other systems were reviewed and are negative except for documented findings in HPI and today's encounter.     Allergies: No Known Allergies  Constitutional:  Denies fever, shaking or chills   Eyes:  Denies change in visual acuity   HENT:  Denies nasal congestion or sore throat   Respiratory:  Denies cough or shortness of breath   Cardiovascular:  Denies chest pain or severe LE edema   GI:  Denies abdominal pain, nausea, vomiting, bloody stools or diarrhea   Musculoskeletal:  Numbness, tingling, pain, or loss of motor " "function only as noted above in history of present illness.  : Denies painful urination or hematuria  Integument:  Denies rash, lesion or ulceration   Neurologic:  Denies headache or focal weakness  Endocrine:  Denies lymphadenopathy  Psych:  Denies confusion or change in mental status   Hem:  Denies active bleeding    OBJECTIVE:  Physical Exam: 70 y.o. male  Wt Readings from Last 3 Encounters:   10/19/20 93 kg (205 lb)   09/06/20 90.7 kg (200 lb)   07/01/20 92.1 kg (203 lb)     Ht Readings from Last 1 Encounters:   10/19/20 170.2 cm (67\")     Body mass index is 32.11 kg/m².  Vitals:    10/19/20 0844   Temp: 98.2 °F (36.8 °C)     Vital signs reviewed.     General Appearance:    Alert, cooperative, in no acute distress                  Eyes: conjunctiva clear  ENT: external ears and nose atraumatic  CV: no peripheral edema  Resp: normal respiratory effort  Skin: no rashes or wounds; normal turgor  Psych: mood and affect appropriate  Lymph: no nodes appreciated  Neuro: gross sensation intact  Vascular:  Palpable peripheral pulse in noted extremity  Musculoskeletal Extremities: Exam today shows his mild to moderate swelling in his left knee really no joint line tenderness little bit of crepitation little stiffness at end range of motion and is not walking with a limp    Radiology:   Previous AP lateral 40 degree PA x-ray September 2020 with comparison view shows arthritis more advanced on the right than left but his symptoms are more on the left than the right    Assessment:     ICD-10-CM ICD-9-CM   1. Arthritis of left knee  M17.12 716.96        Procedures       Plan: The diagnosis(es), natural history, pathophysiology and treatment for diagnosis(es) were discussed. Opportunity given and questions answered.  EXERCISES:  Advice on benefits of, and types of regular/moderate exercise pertaining to orthopedic diagnosis(es).  His knee is not bothering him we will simply watch it inject him today went over some treatment " options and will see him back as necessary.    10/19/2020    Much of this encounter note is an electronic transcription/translation of spoken language to printed text. The electronic translation of spoken language may permit erroneous, or at times, nonsensical words or phrases to be inadvertently transcribed; Although I have reviewed the note for such errors, some may still exist

## 2020-10-20 ENCOUNTER — OFFICE VISIT (OUTPATIENT)
Dept: ONCOLOGY | Facility: CLINIC | Age: 70
End: 2020-10-20

## 2020-10-20 ENCOUNTER — INFUSION (OUTPATIENT)
Dept: ONCOLOGY | Facility: HOSPITAL | Age: 70
End: 2020-10-20

## 2020-10-20 ENCOUNTER — LAB (OUTPATIENT)
Dept: LAB | Facility: HOSPITAL | Age: 70
End: 2020-10-20

## 2020-10-20 VITALS
SYSTOLIC BLOOD PRESSURE: 138 MMHG | OXYGEN SATURATION: 94 % | DIASTOLIC BLOOD PRESSURE: 75 MMHG | HEIGHT: 67 IN | BODY MASS INDEX: 32.38 KG/M2 | RESPIRATION RATE: 16 BRPM | HEART RATE: 81 BPM | TEMPERATURE: 98.4 F | WEIGHT: 206.3 LBS

## 2020-10-20 DIAGNOSIS — E83.110 HEREDITARY HEMOCHROMATOSIS (HCC): ICD-10-CM

## 2020-10-20 DIAGNOSIS — E83.110 HEREDITARY HEMOCHROMATOSIS (HCC): Primary | ICD-10-CM

## 2020-10-20 LAB
ALBUMIN SERPL-MCNC: 4.1 G/DL (ref 3.5–5.2)
ALBUMIN/GLOB SERPL: 1.2 G/DL (ref 1.1–2.4)
ALP SERPL-CCNC: 71 U/L (ref 38–116)
ALT SERPL W P-5'-P-CCNC: 37 U/L (ref 0–41)
ANION GAP SERPL CALCULATED.3IONS-SCNC: 9.3 MMOL/L (ref 5–15)
AST SERPL-CCNC: 36 U/L (ref 0–40)
BASOPHILS # BLD AUTO: 0.09 10*3/MM3 (ref 0–0.2)
BASOPHILS NFR BLD AUTO: 1.1 % (ref 0–1.5)
BILIRUB SERPL-MCNC: 0.5 MG/DL (ref 0.2–1.2)
BUN SERPL-MCNC: 14 MG/DL (ref 6–20)
BUN/CREAT SERPL: 14.6 (ref 7.3–30)
CALCIUM SPEC-SCNC: 9.6 MG/DL (ref 8.5–10.2)
CHLORIDE SERPL-SCNC: 103 MMOL/L (ref 98–107)
CO2 SERPL-SCNC: 27.7 MMOL/L (ref 22–29)
CREAT SERPL-MCNC: 0.96 MG/DL (ref 0.7–1.3)
DEPRECATED RDW RBC AUTO: 48 FL (ref 37–54)
EOSINOPHIL # BLD AUTO: 0.48 10*3/MM3 (ref 0–0.4)
EOSINOPHIL NFR BLD AUTO: 5.8 % (ref 0.3–6.2)
ERYTHROCYTE [DISTWIDTH] IN BLOOD BY AUTOMATED COUNT: 12.8 % (ref 12.3–15.4)
FERRITIN SERPL-MCNC: 102.9 NG/ML (ref 30–400)
GFR SERPL CREATININE-BSD FRML MDRD: 78 ML/MIN/1.73
GLOBULIN UR ELPH-MCNC: 3.3 GM/DL (ref 1.8–3.5)
GLUCOSE SERPL-MCNC: 127 MG/DL (ref 74–124)
HCT VFR BLD AUTO: 41.4 % (ref 37.5–51)
HGB BLD-MCNC: 13.7 G/DL (ref 13–17.7)
IMM GRANULOCYTES # BLD AUTO: 0.02 10*3/MM3 (ref 0–0.05)
IMM GRANULOCYTES NFR BLD AUTO: 0.2 % (ref 0–0.5)
IRON 24H UR-MRATE: 79 MCG/DL (ref 59–158)
IRON SATN MFR SERPL: 21 % (ref 14–48)
LYMPHOCYTES # BLD AUTO: 1.98 10*3/MM3 (ref 0.7–3.1)
LYMPHOCYTES NFR BLD AUTO: 24 % (ref 19.6–45.3)
MCH RBC QN AUTO: 33.7 PG (ref 26.6–33)
MCHC RBC AUTO-ENTMCNC: 33.1 G/DL (ref 31.5–35.7)
MCV RBC AUTO: 101.7 FL (ref 79–97)
MONOCYTES # BLD AUTO: 0.92 10*3/MM3 (ref 0.1–0.9)
MONOCYTES NFR BLD AUTO: 11.2 % (ref 5–12)
NEUTROPHILS NFR BLD AUTO: 4.75 10*3/MM3 (ref 1.7–7)
NEUTROPHILS NFR BLD AUTO: 57.7 % (ref 42.7–76)
NRBC BLD AUTO-RTO: 0 /100 WBC (ref 0–0.2)
PLATELET # BLD AUTO: 202 10*3/MM3 (ref 140–450)
PMV BLD AUTO: 9 FL (ref 6–12)
POTASSIUM SERPL-SCNC: 4.9 MMOL/L (ref 3.5–4.7)
PROT SERPL-MCNC: 7.4 G/DL (ref 6.3–8)
RBC # BLD AUTO: 4.07 10*6/MM3 (ref 4.14–5.8)
SODIUM SERPL-SCNC: 140 MMOL/L (ref 134–145)
TIBC SERPL-MCNC: 377 MCG/DL (ref 249–505)
TRANSFERRIN SERPL-MCNC: 269 MG/DL (ref 200–360)
WBC # BLD AUTO: 8.24 10*3/MM3 (ref 3.4–10.8)

## 2020-10-20 PROCEDURE — 83540 ASSAY OF IRON: CPT

## 2020-10-20 PROCEDURE — 82728 ASSAY OF FERRITIN: CPT

## 2020-10-20 PROCEDURE — 99195 PHLEBOTOMY: CPT

## 2020-10-20 PROCEDURE — 84466 ASSAY OF TRANSFERRIN: CPT

## 2020-10-20 PROCEDURE — 85025 COMPLETE CBC W/AUTO DIFF WBC: CPT

## 2020-10-20 PROCEDURE — 99213 OFFICE O/P EST LOW 20 MIN: CPT | Performed by: NURSE PRACTITIONER

## 2020-10-20 PROCEDURE — 36415 COLL VENOUS BLD VENIPUNCTURE: CPT

## 2020-10-20 PROCEDURE — 80053 COMPREHEN METABOLIC PANEL: CPT

## 2020-10-20 RX ORDER — SODIUM CHLORIDE 9 MG/ML
250 INJECTION, SOLUTION INTRAVENOUS ONCE
Status: DISCONTINUED | OUTPATIENT
Start: 2020-10-20 | End: 2020-10-20 | Stop reason: HOSPADM

## 2020-10-20 RX ORDER — SODIUM CHLORIDE 9 MG/ML
250 INJECTION, SOLUTION INTRAVENOUS ONCE
Status: CANCELLED | OUTPATIENT
Start: 2020-10-20

## 2020-10-20 NOTE — PROGRESS NOTES
REASONS FOR FOLLOWUP:    Hemochromatosis    HISTORY OF PRESENT ILLNESS:  The patient is a 70 y.o. year old male with hemochromatosis, undergoing phlebotomy for ferritin rater than 100.  Historically he has required phlebotomies 2-3 times a year.  He last underwent phlebotomy in June for ferritin of 143.    As he is reviewed back today he notes fracturing his left fibula in September requiring him to wear a boot.  He is having some residual pain and stiffness in this leg affecting his mobility.  He otherwise continues with baseline fatigue.      We reviewed his ferritin level today at 102.  We discussed phlebotomizing him per protocol and perhaps seeing him back in 6 months or after.  He is agreeable.    Patient does have atrial fibrillation, now on anticoagulation with warfarin.  INR is followed by his cardiologist.    He denies other concerns at this time.    Past Medical History:   Diagnosis Date   • Atrial fibrillation (CMS/HCC)    • Atrial flutter (CMS/HCC)    • Diabetes (CMS/HCC)    • Gout    • Heart disease     Status post pacemaker implantation   • Hemochromatosis    • History of syncope    • Hyperlipidemia    • Malignant neoplasm of prostate (CMS/HCC) 2018   • RBBB (right bundle branch block)    • Sleep apnea    • SSS (sick sinus syndrome) (CMS/HCC)    • SVT (supraventricular tachycardia) (CMS/HCC)      Past Surgical History:   Procedure Laterality Date   • CARDIAC ABLATION  2010    Paroxysmal atrial fib/Dr. Koo   • CARDIAC ABLATION  2012    Recurrent atrial flutter/Dr. Koo   • CARDIAC CATHETERIZATION     • CARDIAC PACEMAKER PLACEMENT  2014    Medtronic dual chamber/Dr. Manriquez   • KNEE SURGERY Right    • PACEMAKER IMPLANTATION  05/2006       HEMATOLOGIC/ONCOLOGIC HISTORY:  (History from previous dates can be found in the separate document.)    MEDICATIONS    Current Outpatient Medications:   •  allopurinol (ZYLOPRIM) 300 MG tablet, Take 300 mg by mouth Daily., Disp: , Rfl:   •  atorvastatin (LIPITOR) 40  "MG tablet, Take 40 mg by mouth Daily., Disp: , Rfl:   •  HYDROcodone-acetaminophen (NORCO) 7.5-325 MG per tablet, Take 1 tablet by mouth Every 4 (Four) Hours As Needed for Moderate Pain ., Disp: 30 tablet, Rfl: 0  •  lisinopril (PRINIVIL,ZESTRIL) 10 MG tablet, Take 10 mg by mouth Daily., Disp: , Rfl:   •  metFORMIN (GLUCOPHAGE) 500 MG tablet, Take 500 mg by mouth Daily With Breakfast., Disp: , Rfl:   •  metoprolol succinate XL (TOPROL-XL) 25 MG 24 hr tablet, Take 25 mg by mouth Daily., Disp: , Rfl:   •  Multiple Vitamins-Minerals (MULTIVITAMIN ADULT PO), Take  by mouth., Disp: , Rfl:   •  warfarin (COUMADIN) 5 MG tablet, Take 5 mg by mouth Daily., Disp: , Rfl:     ALLERGIES:   No Known Allergies    SOCIAL HISTORY:       Social History     Socioeconomic History   • Marital status:      Spouse name: Suzie   • Number of children: Not on file   • Years of education: High School   • Highest education level: Not on file   Occupational History     Employer: RETIRED   Tobacco Use   • Smoking status: Former Smoker     Types: Cigars   • Smokeless tobacco: Never Used   Substance and Sexual Activity   • Alcohol use: Yes     Comment: Moderate \"few beers daily\"   • Drug use: No   • Sexual activity: Defer         FAMILY HISTORY:  Family History   Problem Relation Age of Onset   • Cancer Other         LYMPH & BREAST   • Diabetes Other    • Hypertension Other    • Heart disease Other    • Breast cancer Mother    • Lymphoma Father    • Hemochromatosis Father        REVIEW OF SYSTEMS:  GENERAL: No change in appetite or weight;   No fevers, chills, sweats.    SKIN: No nonhealing lesions.   No rashes.  HEME/LYMPH: No swollen nodes.   EYES: No vision changes or diplopia.   ENT: No tinnitus, hearing loss, gum bleeding, epistaxis, hoarseness or dysphagia.   RESPIRATORY: No cough, shortness of breath, hemoptysis or wheezing.   CVS: No chest pain, palpitations, orthopnea, dyspnea on exertion or PND.   GI: No melena or hematochezia. " "  No abdominal pain.  No nausea, vomiting, constipation, diarrhea  : No dysuria or hematuria.   MUSCULOSKELETAL: Chronic arthralgias.  Pain in the left lower leg after recent fracture.  NEUROLOGICAL: No global weakness, loss of consciousness or seizures.   ROS unchanged 10/20/2020 except were noted    Objective    Vitals:    10/20/20 1403   BP: 138/75   Pulse: 81   Resp: 16   Temp: 98.4 °F (36.9 °C)   SpO2: 94%   Weight: 93.6 kg (206 lb 4.8 oz)   Height: 170.2 cm (67.01\")   PainSc: 0-No pain     Current Status 10/20/2020   ECOG score 0       PHYSICAL EXAM:    GENERAL:  Well-developed, well-nourished in no acute distress.   SKIN:  Warm, dry without rashes, purpura or petechiae.  EYES:  Pupils equal, round and reactive to light.  EOMs intact.  Conjunctivae normal.  EARS:  Hearing intact.  NOSE:  Septum midline.  No excoriations or nasal discharge.  MOUTH:  Tongue is well-papillated; no stomatitis or ulcers.  Lips normal.  THROAT:  Oropharynx without lesions or exudates.  NECK:  Supple with good range of motion; no thyromegaly or masses, no JVD.  LYMPHATICS:  No cervical, supraclavicular, axillary or inguinal adenopathy.  CHEST:  Lungs clear to auscultation. Good airflow.  CARDIAC:  Regular rate and rhythm without murmurs, rubs or gallops. Normal S1,S2.  EXTREMITIES:  No clubbing, cyanosis or edema.  NEUROLOGICAL:  No focal neurological deficits.  PSYCHIATRIC:  Normal affect and mood.       RECENT LABS:  Results from last 7 days   Lab Units 10/20/20  1346   WBC 10*3/mm3 8.24   NEUTROS ABS 10*3/mm3 4.75   HEMOGLOBIN g/dL 13.7   HEMATOCRIT % 41.4   PLATELETS 10*3/mm3 202     Results from last 7 days   Lab Units 10/20/20  1346   SODIUM mmol/L 140   POTASSIUM mmol/L 4.9*   CHLORIDE mmol/L 103   CO2 mmol/L 27.7   BUN mg/dL 14   CREATININE mg/dL 0.96   CALCIUM mg/dL 9.6   ALBUMIN g/dL 4.10   BILIRUBIN mg/dL 0.5   ALK PHOS U/L 71   ALT (SGPT) U/L 37   AST (SGOT) U/L 36   GLUCOSE mg/dL 127*   FERRITIN ng/mL 102.90   IRON " mcg/dL 79   TIBC mcg/dL 377             Assessment/Plan    ASSESSMENT:  1. Hereditary hemochromatosis:   · Patient historically undergoing phlebotomy 2-3 times a year.  Last phlebotomy was 6/2/2020 for ferritin of 143.    · Today ferritin is 102.  Patient will undergo phlebotomy and otherwise return in 6-month interval.    2.  Cigar use-we have previously discussed smoking cessation  3.  Atrial fibrillation, on warfarin, managed per cardiology.   4.  Sick sinus syndrome    PLAN:  1.  Proceed with phlebotomy of 500 mL today.  2.  Patient will return in 6 months for follow-up with Dr. Morales with CBC and ferritin.  Phlebotomize for ferritin greater than 100.

## 2021-03-05 ENCOUNTER — OFFICE VISIT (OUTPATIENT)
Dept: ORTHOPEDIC SURGERY | Facility: CLINIC | Age: 71
End: 2021-03-05

## 2021-03-05 VITALS — HEIGHT: 67 IN | TEMPERATURE: 97 F | BODY MASS INDEX: 31.39 KG/M2 | WEIGHT: 200 LBS

## 2021-03-05 DIAGNOSIS — M17.12 ARTHRITIS OF LEFT KNEE: ICD-10-CM

## 2021-03-05 DIAGNOSIS — R52 PAIN: Primary | ICD-10-CM

## 2021-03-05 PROCEDURE — 20610 DRAIN/INJ JOINT/BURSA W/O US: CPT | Performed by: ORTHOPAEDIC SURGERY

## 2021-03-05 PROCEDURE — 99213 OFFICE O/P EST LOW 20 MIN: CPT | Performed by: ORTHOPAEDIC SURGERY

## 2021-03-05 PROCEDURE — 73562 X-RAY EXAM OF KNEE 3: CPT | Performed by: ORTHOPAEDIC SURGERY

## 2021-03-05 RX ADMIN — METHYLPREDNISOLONE ACETATE 80 MG: 80 INJECTION, SUSPENSION INTRA-ARTICULAR; INTRALESIONAL; INTRAMUSCULAR; SOFT TISSUE at 07:55

## 2021-03-05 NOTE — PROGRESS NOTES
Patient Name: Loyd Avalos   YOB: 1950  Referring Primary Care Physician: Ari oLpez Jr., MD  BMI: Body mass index is 31.32 kg/m².    Chief Complaint:    Chief Complaint   Patient presents with   • Left Knee - Follow-up, Pain        HPI:     Loyd Avalos is a 70 y.o. male who presents today for evaluation of   Chief Complaint   Patient presents with   • Left Knee - Follow-up, Pain   . Mr. Way presents today for left knee pain. He has been weight-bearing on the left knee frequently and it has gotten worse. He denies injury. The right knee is bothersome but tolerable.       Subjective   Medications:   Home Medications:  Current Outpatient Medications on File Prior to Visit   Medication Sig   • allopurinol (ZYLOPRIM) 300 MG tablet Take 300 mg by mouth Daily.   • atorvastatin (LIPITOR) 40 MG tablet Take 40 mg by mouth Daily.   • lisinopril (PRINIVIL,ZESTRIL) 10 MG tablet Take 10 mg by mouth Daily.   • metFORMIN (GLUCOPHAGE) 500 MG tablet Take 500 mg by mouth Daily With Breakfast.   • metoprolol succinate XL (TOPROL-XL) 25 MG 24 hr tablet Take 25 mg by mouth Daily.   • Multiple Vitamins-Minerals (MULTIVITAMIN ADULT PO) Take  by mouth.   • warfarin (COUMADIN) 5 MG tablet Take 5 mg by mouth Daily.   • HYDROcodone-acetaminophen (NORCO) 7.5-325 MG per tablet Take 1 tablet by mouth Every 4 (Four) Hours As Needed for Moderate Pain .     No current facility-administered medications on file prior to visit.     Current Medications:  Scheduled Meds:  Continuous Infusions:No current facility-administered medications for this visit.    PRN Meds:.    I have reviewed the patient's medical history in detail and updated the computerized patient record.  Review and summarization of old records includes:    Past Medical History:   Diagnosis Date   • Atrial fibrillation (CMS/HCC)    • Atrial flutter (CMS/HCC)    • Diabetes (CMS/HCC)    • Gout    • Heart disease     Status post pacemaker implantation  "  • Hemochromatosis    • History of syncope    • Hyperlipidemia    • Malignant neoplasm of prostate (CMS/HCC) 2018   • RBBB (right bundle branch block)    • Sleep apnea    • SSS (sick sinus syndrome) (CMS/HCC)    • SVT (supraventricular tachycardia) (CMS/HCC)         Past Surgical History:   Procedure Laterality Date   • CARDIAC ABLATION  2010    Paroxysmal atrial fib/Dr. Koo   • CARDIAC ABLATION  2012    Recurrent atrial flutter/Dr. Koo   • CARDIAC CATHETERIZATION     • CARDIAC PACEMAKER PLACEMENT  2014    Medtronic dual chamber/Dr. Manriquez   • KNEE SURGERY Right    • PACEMAKER IMPLANTATION  05/2006        Social History     Occupational History     Employer: RETIRED   Tobacco Use   • Smoking status: Former Smoker     Types: Cigars   • Smokeless tobacco: Never Used   Substance and Sexual Activity   • Alcohol use: Yes     Comment: Moderate \"few beers daily\"   • Drug use: No   • Sexual activity: Defer      Social History     Social History Narrative   • Not on file        Family History   Problem Relation Age of Onset   • Cancer Other         LYMPH & BREAST   • Diabetes Other    • Hypertension Other    • Heart disease Other    • Breast cancer Mother    • Lymphoma Father    • Hemochromatosis Father        ROS: 14 point review of systems was performed and all other systems were reviewed and are negative except for documented findings in HPI and today's encounter.     Allergies: No Known Allergies  Constitutional:  Denies fever, shaking or chills   Eyes:  Denies change in visual acuity   HENT:  Denies nasal congestion or sore throat   Respiratory:  Denies cough or shortness of breath   Cardiovascular:  Denies chest pain or severe LE edema   GI:  Denies abdominal pain, nausea, vomiting, bloody stools or diarrhea   Musculoskeletal:  Numbness, tingling, pain, or loss of motor function only as noted above in history of present illness.  : Denies painful urination or hematuria  Integument:  Denies rash, lesion or " "ulceration   Neurologic:  Denies headache or focal weakness  Endocrine:  Denies lymphadenopathy  Psych:  Denies confusion or change in mental status   Hem:  Denies active bleeding    OBJECTIVE:  Physical Exam: 70 y.o. male  Wt Readings from Last 3 Encounters:   03/05/21 90.7 kg (200 lb)   10/20/20 93.6 kg (206 lb 4.8 oz)   10/19/20 93 kg (205 lb)     Ht Readings from Last 1 Encounters:   03/05/21 170.2 cm (67\")     Body mass index is 31.32 kg/m².  Vitals:    03/05/21 0945   Temp: 97 °F (36.1 °C)     Vital signs reviewed.     General Appearance:    Alert, cooperative, in no acute distress                  Eyes: conjunctiva clear  ENT: external ears and nose atraumatic  CV: no peripheral edema  Resp: normal respiratory effort  Skin: no rashes or wounds; normal turgor  Psych: mood and affect appropriate  Lymph: no nodes appreciated  Neuro: gross sensation intact  Vascular:  Palpable peripheral pulse in noted extremity  Musculoskeletal Extremities: Left knee- Crepitation synovitis, swelling, and medial joint line tenderness. Minimal pseudolaxity. His hip is stiff but noncontributory. Range of motion is from -5 to 115 degrees.     Radiology:   Three views of the left knee, including AP, lateral, and 40 degree PA views, were obtained and reviewed in the office today for pain. With comparison views, these demonstrated fairly advanced tricompartmental arthritis. It is slightly worse on the less affected right side than the left at this time.      Large Joint Arthrocentesis: L knee  Date/Time: 3/5/2021 7:55 AM  Consent given by: patient  Site marked: site marked  Timeout: Immediately prior to procedure a time out was called to verify the correct patient, procedure, equipment, support staff and site/side marked as required   Supporting Documentation  Indications: pain and joint swelling   Procedure Details  Location: knee - L knee  Preparation: Patient was prepped and draped in the usual sterile fashion  Needle gauge: 21 " G.  Approach: anterolateral  Medications administered: 80 mg methylPREDNISolone acetate 80 MG/ML; 4 mL lidocaine (cardiac)  Patient tolerance: patient tolerated the procedure well with no immediate complications            Assessment:     ICD-10-CM ICD-9-CM   1. Pain  R52 780.96   2. Arthritis of left knee  M17.12 716.96        MDM/Plan:   The diagnosis(es), natural history, pathophysiology and treatment for diagnosis(es) were discussed. Opportunity given and questions answered.  Biomechanics of pertinent body areas discussed.  When appropriate, the use of ambulatory aids discussed.    We reviewed x-rays of the left knee today and they revealed fairly advanced tricompartmental arthritis. I recommended physical therapy and explained what could be done. He says he is getting some exercise and remains fairly active and wants to put the knee arthroplasty on hold. He will use ice, heat, and liniments to control inflammation and pain.  He is on anticoagulants so I cautioned about medications and reviewed them. I reviewed his records and we will do a corticosteroid injection. I answered his questions and we will see him back as needed.     Scribed for Aldo Sánchez MD by NAVIN MART.  3/8/2021  12:30 EST    I Aldo Sánchez MD have personally performed the services described in this document as scribed by the above individual, and it is both accurate and complete.       3/8/2021

## 2021-03-08 RX ORDER — METHYLPREDNISOLONE ACETATE 80 MG/ML
80 INJECTION, SUSPENSION INTRA-ARTICULAR; INTRALESIONAL; INTRAMUSCULAR; SOFT TISSUE
Status: COMPLETED | OUTPATIENT
Start: 2021-03-05 | End: 2021-03-05

## 2021-04-29 ENCOUNTER — TELEPHONE (OUTPATIENT)
Dept: ONCOLOGY | Facility: CLINIC | Age: 71
End: 2021-04-29

## 2021-04-29 NOTE — TELEPHONE ENCOUNTER
Caller: TRUNG BURRIS    Relationship to patient: SELF    Best call back number: [phone]    Type of visit: LAB AND FOLLOW UP    If rescheduling, when is the original appointment: 4/30/21    Additional notes: PLEASE CALL TO RESCHEDULE

## 2021-04-30 ENCOUNTER — APPOINTMENT (OUTPATIENT)
Dept: LAB | Facility: HOSPITAL | Age: 71
End: 2021-04-30

## 2021-05-03 NOTE — PROGRESS NOTES
REASONS FOR FOLLOWUP:    Hemochromatosis    HISTORY OF PRESENT ILLNESS:  The patient is a 70 y.o. year old male with hemochromatosis, undergoing phlebotomy for ferritin rater than 100.  Historically he has required phlebotomies 2-3 times a year.  He last underwent phlebotomy in October 2020 for ferritin of 103.    As he is reviewed back today he tells me his PSA has been going up recently after his prostate cancer radiation 3 years ago and Dr. abdullahi biopsied his prostate and this was negative and did a CAT scan of the abdomen which showed a mildly enlarged lymph node in the periaortic area which is new PSA is up to 6.46    He told me  wanted to discuss the situation with him and I told him we would do an Axumin PET scan repeat his PSA and discussed with Dr. Abdullahi about Lupron with or without abiraterone     we discussed phlebotomizing him per protocol today     Patient does have atrial fibrillation, now on anticoagulation with warfarin.  INR is followed by his cardiologist.    He denies other concerns at this time.  He denies bone pains weight loss or other systemic complaints    Past Medical History:   Diagnosis Date   • Atrial fibrillation (CMS/HCC)    • Atrial flutter (CMS/HCC)    • Diabetes (CMS/HCC)    • Gout    • Heart disease     Status post pacemaker implantation   • Hemochromatosis    • History of syncope    • Hyperlipidemia    • Malignant neoplasm of prostate (CMS/HCC) 2018   • RBBB (right bundle branch block)    • Sleep apnea    • SSS (sick sinus syndrome) (CMS/HCC)    • SVT (supraventricular tachycardia) (CMS/HCC)      Past Surgical History:   Procedure Laterality Date   • CARDIAC ABLATION  2010    Paroxysmal atrial fib/Dr. Koo   • CARDIAC ABLATION  2012    Recurrent atrial flutter/Dr. Koo   • CARDIAC CATHETERIZATION     • CARDIAC PACEMAKER PLACEMENT  2014    Medtronic dual chamber/Dr. Manriquez   • KNEE SURGERY Right    • PACEMAKER IMPLANTATION  05/2006       HEMATOLOGIC/ONCOLOGIC HISTORY:  (History  "from previous dates can be found in the separate document.)    MEDICATIONS    Current Outpatient Medications:   •  allopurinol (ZYLOPRIM) 300 MG tablet, Take 300 mg by mouth Daily., Disp: , Rfl:   •  atorvastatin (LIPITOR) 40 MG tablet, Take 40 mg by mouth Daily., Disp: , Rfl:   •  cyclobenzaprine (FLEXERIL) 10 MG tablet, , Disp: , Rfl:   •  HYDROcodone-acetaminophen (NORCO) 7.5-325 MG per tablet, Take 1 tablet by mouth Every 4 (Four) Hours As Needed for Moderate Pain ., Disp: 30 tablet, Rfl: 0  •  lisinopril (PRINIVIL,ZESTRIL) 10 MG tablet, Take 10 mg by mouth Daily., Disp: , Rfl:   •  metFORMIN (GLUCOPHAGE) 500 MG tablet, Take 500 mg by mouth Daily With Breakfast., Disp: , Rfl:   •  metoprolol succinate XL (TOPROL-XL) 25 MG 24 hr tablet, Take 25 mg by mouth Daily., Disp: , Rfl:   •  Multiple Vitamins-Minerals (MULTIVITAMIN ADULT PO), Take  by mouth., Disp: , Rfl:   •  warfarin (COUMADIN) 5 MG tablet, Take 5 mg by mouth Daily., Disp: , Rfl:   No current facility-administered medications for this visit.    ALLERGIES:   No Known Allergies    SOCIAL HISTORY:       Social History     Socioeconomic History   • Marital status:      Spouse name: Suzie   • Number of children: Not on file   • Years of education: High School   • Highest education level: Not on file   Tobacco Use   • Smoking status: Former Smoker     Types: Cigars   • Smokeless tobacco: Never Used   Substance and Sexual Activity   • Alcohol use: Yes     Comment: Moderate \"few beers daily\"   • Drug use: No   • Sexual activity: Defer         FAMILY HISTORY:  Family History   Problem Relation Age of Onset   • Cancer Other         LYMPH & BREAST   • Diabetes Other    • Hypertension Other    • Heart disease Other    • Breast cancer Mother    • Lymphoma Father    • Hemochromatosis Father        REVIEW OF SYSTEMS:  GENERAL: No change in appetite or weight;   No fevers, chills, sweats.    SKIN: No nonhealing lesions.   No rashes.  HEME/LYMPH: No swollen " "nodes.   EYES: No vision changes or diplopia.   ENT: No tinnitus, hearing loss, gum bleeding, epistaxis, hoarseness or dysphagia.   RESPIRATORY: No cough, shortness of breath, hemoptysis or wheezing.   CVS: No chest pain, palpitations, orthopnea, dyspnea on exertion or PND.   GI: No melena or hematochezia.   No abdominal pain.  No nausea, vomiting, constipation, diarrhea  : No dysuria or hematuria.   MUSCULOSKELETAL: Chronic arthralgias.  Pain in the left lower leg after recent fracture.  NEUROLOGICAL: No global weakness, loss of consciousness or seizures.       Objective    Vitals:    05/05/21 0809   BP: 108/72   Pulse: 96   Resp: 16   Temp: 97.1 °F (36.2 °C)   TempSrc: Skin   SpO2: 97%   Weight: 96.2 kg (212 lb)   Height: 170.2 cm (67.01\")   PainSc: 0-No pain     Current Status 5/5/2021   ECOG score 0       PHYSICAL EXAM:    GENERAL:  Well-developed, well-nourished in no acute distress.   SKIN:  Warm, dry without rashes, purpura or petechiae.  EYES:  Pupils equal, round and reactive to light.  EOMs intact.  Conjunctivae normal.  EARS:  Hearing intact.  NOSE:  Septum midline.  No excoriations or nasal discharge.  MOUTH:  Tongue is well-papillated; no stomatitis or ulcers.  Lips normal.  THROAT:  Oropharynx without lesions or exudates.  NECK:  Supple with good range of motion; no thyromegaly or masses, no JVD.  LYMPHATICS:  No cervical, supraclavicular, axillary or inguinal adenopathy.  CHEST:  Lungs clear to auscultation. Good airflow.  CARDIAC:  Regular rate and rhythm without murmurs, rubs or gallops. Normal S1,S2.  EXTREMITIES:  No clubbing, cyanosis or edema.  NEUROLOGICAL:  No focal neurological deficits.  PSYCHIATRIC:  Normal affect and mood.   I have reexamined the patient and the results are consistent with the previously documented exam. Jewel Morales MD       RECENT LABS:  Results from last 7 days   Lab Units 05/05/21  0807   WBC 10*3/mm3 10.27   NEUTROS ABS 10*3/mm3 5.83   HEMOGLOBIN g/dL 13.8 "   HEMATOCRIT % 40.7   PLATELETS 10*3/mm3 190     Results from last 7 days   Lab Units 05/05/21  0807   FERRITIN ng/mL 172.40             Assessment/Plan    ASSESSMENT:  1. Hereditary hemochromatosis:   · Patient historically undergoing phlebotomy 2-3 times a year.  Last phlebotomy was 6/2/2020 for ferritin of 143.    · Continue phlebotomy 2-3 times a year as needed.    2.  Cigar use-we have previously discussed smoking cessation  3.  Atrial fibrillation, on warfarin, managed per cardiology.   4.  Sick sinus syndrome  5.  Prostate cancer post radiation 2018 with rising PSA-  · Prostate biopsy negative in 2 /21   · CT scan with enlarged IMV chain lymph node measuring 11 mm PSA 6.5-4/21    PLAN:  1.  Proceed with phlebotomy of 500 mL today.  2.  Axumin PET scan-PSA  3.  Return in 4 weeks for possible Lupron and abiraterone will discuss with Dr. Du      I did review CAT scan reports and notes from Dr. Du's office which the patient brought with him and contacted Dr. Hollins to discuss management

## 2021-05-05 ENCOUNTER — OFFICE VISIT (OUTPATIENT)
Dept: ONCOLOGY | Facility: CLINIC | Age: 71
End: 2021-05-05

## 2021-05-05 ENCOUNTER — TELEPHONE (OUTPATIENT)
Dept: ONCOLOGY | Facility: CLINIC | Age: 71
End: 2021-05-05

## 2021-05-05 ENCOUNTER — LAB (OUTPATIENT)
Dept: LAB | Facility: HOSPITAL | Age: 71
End: 2021-05-05

## 2021-05-05 ENCOUNTER — INFUSION (OUTPATIENT)
Dept: ONCOLOGY | Facility: HOSPITAL | Age: 71
End: 2021-05-05

## 2021-05-05 VITALS
DIASTOLIC BLOOD PRESSURE: 72 MMHG | TEMPERATURE: 97.1 F | HEART RATE: 96 BPM | BODY MASS INDEX: 33.27 KG/M2 | HEIGHT: 67 IN | SYSTOLIC BLOOD PRESSURE: 108 MMHG | WEIGHT: 212 LBS | RESPIRATION RATE: 16 BRPM | OXYGEN SATURATION: 97 %

## 2021-05-05 DIAGNOSIS — C61 MALIGNANT NEOPLASM OF PROSTATE (HCC): ICD-10-CM

## 2021-05-05 DIAGNOSIS — E83.110 HEREDITARY HEMOCHROMATOSIS (HCC): Primary | ICD-10-CM

## 2021-05-05 DIAGNOSIS — E83.110 HEREDITARY HEMOCHROMATOSIS (HCC): ICD-10-CM

## 2021-05-05 LAB
BASOPHILS # BLD AUTO: 0.11 10*3/MM3 (ref 0–0.2)
BASOPHILS NFR BLD AUTO: 1.1 % (ref 0–1.5)
DEPRECATED RDW RBC AUTO: 46.7 FL (ref 37–54)
EOSINOPHIL # BLD AUTO: 0.53 10*3/MM3 (ref 0–0.4)
EOSINOPHIL NFR BLD AUTO: 5.2 % (ref 0.3–6.2)
ERYTHROCYTE [DISTWIDTH] IN BLOOD BY AUTOMATED COUNT: 13.1 % (ref 12.3–15.4)
FERRITIN SERPL-MCNC: 172.4 NG/ML (ref 30–400)
HCT VFR BLD AUTO: 40.7 % (ref 37.5–51)
HGB BLD-MCNC: 13.8 G/DL (ref 13–17.7)
IMM GRANULOCYTES # BLD AUTO: 0.03 10*3/MM3 (ref 0–0.05)
IMM GRANULOCYTES NFR BLD AUTO: 0.3 % (ref 0–0.5)
LYMPHOCYTES # BLD AUTO: 2.91 10*3/MM3 (ref 0.7–3.1)
LYMPHOCYTES NFR BLD AUTO: 28.3 % (ref 19.6–45.3)
MCH RBC QN AUTO: 33.3 PG (ref 26.6–33)
MCHC RBC AUTO-ENTMCNC: 33.9 G/DL (ref 31.5–35.7)
MCV RBC AUTO: 98.1 FL (ref 79–97)
MONOCYTES # BLD AUTO: 0.86 10*3/MM3 (ref 0.1–0.9)
MONOCYTES NFR BLD AUTO: 8.4 % (ref 5–12)
NEUTROPHILS NFR BLD AUTO: 5.83 10*3/MM3 (ref 1.7–7)
NEUTROPHILS NFR BLD AUTO: 56.7 % (ref 42.7–76)
NRBC BLD AUTO-RTO: 0 /100 WBC (ref 0–0.2)
PLATELET # BLD AUTO: 190 10*3/MM3 (ref 140–450)
PMV BLD AUTO: 8.9 FL (ref 6–12)
PSA SERPL-MCNC: 8.49 NG/ML (ref 0–4)
RBC # BLD AUTO: 4.15 10*6/MM3 (ref 4.14–5.8)
TESTOST SERPL-MCNC: 328 NG/DL (ref 193–740)
WBC # BLD AUTO: 10.27 10*3/MM3 (ref 3.4–10.8)

## 2021-05-05 PROCEDURE — 99205 OFFICE O/P NEW HI 60 MIN: CPT | Performed by: INTERNAL MEDICINE

## 2021-05-05 PROCEDURE — 99195 PHLEBOTOMY: CPT

## 2021-05-05 PROCEDURE — 84153 ASSAY OF PSA TOTAL: CPT | Performed by: INTERNAL MEDICINE

## 2021-05-05 PROCEDURE — 82728 ASSAY OF FERRITIN: CPT

## 2021-05-05 PROCEDURE — 85025 COMPLETE CBC W/AUTO DIFF WBC: CPT

## 2021-05-05 PROCEDURE — 36415 COLL VENOUS BLD VENIPUNCTURE: CPT

## 2021-05-05 PROCEDURE — 84403 ASSAY OF TOTAL TESTOSTERONE: CPT | Performed by: INTERNAL MEDICINE

## 2021-05-05 RX ORDER — SODIUM CHLORIDE 9 MG/ML
250 INJECTION, SOLUTION INTRAVENOUS ONCE
Status: CANCELLED | OUTPATIENT
Start: 2021-05-05

## 2021-05-05 RX ORDER — CYCLOBENZAPRINE HCL 10 MG
TABLET ORAL
COMMUNITY
Start: 2021-04-08

## 2021-05-05 RX ORDER — SODIUM CHLORIDE 9 MG/ML
250 INJECTION, SOLUTION INTRAVENOUS ONCE
Status: COMPLETED | OUTPATIENT
Start: 2021-05-05 | End: 2021-05-05

## 2021-05-05 RX ADMIN — SODIUM CHLORIDE 250 ML: 900 INJECTION, SOLUTION INTRAVENOUS at 09:00

## 2021-05-19 ENCOUNTER — APPOINTMENT (OUTPATIENT)
Dept: OTHER | Facility: HOSPITAL | Age: 71
End: 2021-05-19

## 2021-05-19 ENCOUNTER — HOSPITAL ENCOUNTER (OUTPATIENT)
Dept: PET IMAGING | Facility: HOSPITAL | Age: 71
Discharge: HOME | End: 2021-05-19

## 2021-05-19 DIAGNOSIS — Z09 FOLLOW UP: ICD-10-CM

## 2021-05-19 DIAGNOSIS — C61 MALIGNANT NEOPLASM OF PROSTATE (HCC): ICD-10-CM

## 2021-05-19 DIAGNOSIS — E83.110 HEREDITARY HEMOCHROMATOSIS (HCC): ICD-10-CM

## 2021-05-19 PROCEDURE — A9588 FLUCICLOVINE F-18: HCPCS | Performed by: INTERNAL MEDICINE

## 2021-05-19 PROCEDURE — 0 FLUCICLOVINE: Performed by: INTERNAL MEDICINE

## 2021-05-19 PROCEDURE — 78815 PET IMAGE W/CT SKULL-THIGH: CPT

## 2021-05-19 RX ADMIN — FLUCICLOVINE F-18 1 DOSE: 221 INJECTION, SOLUTION INTRAVENOUS at 12:53

## 2021-06-02 ENCOUNTER — APPOINTMENT (OUTPATIENT)
Dept: ONCOLOGY | Facility: HOSPITAL | Age: 71
End: 2021-06-02

## 2021-06-02 ENCOUNTER — OFFICE VISIT (OUTPATIENT)
Dept: ONCOLOGY | Facility: CLINIC | Age: 71
End: 2021-06-02

## 2021-06-02 ENCOUNTER — LAB (OUTPATIENT)
Dept: LAB | Facility: HOSPITAL | Age: 71
End: 2021-06-02

## 2021-06-02 VITALS
OXYGEN SATURATION: 94 % | RESPIRATION RATE: 16 BRPM | TEMPERATURE: 97.5 F | HEART RATE: 93 BPM | SYSTOLIC BLOOD PRESSURE: 126 MMHG | BODY MASS INDEX: 33.38 KG/M2 | WEIGHT: 212.7 LBS | DIASTOLIC BLOOD PRESSURE: 82 MMHG | HEIGHT: 67 IN

## 2021-06-02 DIAGNOSIS — C61 MALIGNANT NEOPLASM OF PROSTATE (HCC): Primary | ICD-10-CM

## 2021-06-02 DIAGNOSIS — E83.110 HEREDITARY HEMOCHROMATOSIS (HCC): ICD-10-CM

## 2021-06-02 DIAGNOSIS — C61 MALIGNANT NEOPLASM OF PROSTATE (HCC): ICD-10-CM

## 2021-06-02 LAB
ALBUMIN SERPL-MCNC: 4.1 G/DL (ref 3.5–5.2)
ALBUMIN/GLOB SERPL: 1.2 G/DL (ref 1.1–2.4)
ALP SERPL-CCNC: 72 U/L (ref 38–116)
ALT SERPL W P-5'-P-CCNC: 37 U/L (ref 0–41)
ANION GAP SERPL CALCULATED.3IONS-SCNC: 13.4 MMOL/L (ref 5–15)
AST SERPL-CCNC: 31 U/L (ref 0–40)
BASOPHILS # BLD AUTO: 0.08 10*3/MM3 (ref 0–0.2)
BASOPHILS NFR BLD AUTO: 1 % (ref 0–1.5)
BILIRUB SERPL-MCNC: 0.3 MG/DL (ref 0.2–1.2)
BUN SERPL-MCNC: 14 MG/DL (ref 6–20)
BUN/CREAT SERPL: 13.7 (ref 7.3–30)
CALCIUM SPEC-SCNC: 9.6 MG/DL (ref 8.5–10.2)
CHLORIDE SERPL-SCNC: 106 MMOL/L (ref 98–107)
CO2 SERPL-SCNC: 22.6 MMOL/L (ref 22–29)
CREAT SERPL-MCNC: 1.02 MG/DL (ref 0.7–1.3)
DEPRECATED RDW RBC AUTO: 48.9 FL (ref 37–54)
EOSINOPHIL # BLD AUTO: 0.37 10*3/MM3 (ref 0–0.4)
EOSINOPHIL NFR BLD AUTO: 4.6 % (ref 0.3–6.2)
ERYTHROCYTE [DISTWIDTH] IN BLOOD BY AUTOMATED COUNT: 13.2 % (ref 12.3–15.4)
GFR SERPL CREATININE-BSD FRML MDRD: 73 ML/MIN/1.73
GLOBULIN UR ELPH-MCNC: 3.3 GM/DL (ref 1.8–3.5)
GLUCOSE SERPL-MCNC: 110 MG/DL (ref 74–124)
HCT VFR BLD AUTO: 40.7 % (ref 37.5–51)
HGB BLD-MCNC: 14.7 G/DL (ref 13–17.7)
IMM GRANULOCYTES # BLD AUTO: 0.03 10*3/MM3 (ref 0–0.05)
IMM GRANULOCYTES NFR BLD AUTO: 0.4 % (ref 0–0.5)
LYMPHOCYTES # BLD AUTO: 2.18 10*3/MM3 (ref 0.7–3.1)
LYMPHOCYTES NFR BLD AUTO: 27 % (ref 19.6–45.3)
MCH RBC QN AUTO: 36.1 PG (ref 26.6–33)
MCHC RBC AUTO-ENTMCNC: 36.1 G/DL (ref 31.5–35.7)
MCV RBC AUTO: 100 FL (ref 79–97)
MONOCYTES # BLD AUTO: 0.8 10*3/MM3 (ref 0.1–0.9)
MONOCYTES NFR BLD AUTO: 9.9 % (ref 5–12)
NEUTROPHILS NFR BLD AUTO: 4.61 10*3/MM3 (ref 1.7–7)
NEUTROPHILS NFR BLD AUTO: 57.1 % (ref 42.7–76)
NRBC BLD AUTO-RTO: 0 /100 WBC (ref 0–0.2)
PLATELET # BLD AUTO: 183 10*3/MM3 (ref 140–450)
PMV BLD AUTO: 8.7 FL (ref 6–12)
POTASSIUM SERPL-SCNC: 4.3 MMOL/L (ref 3.5–4.7)
PROT SERPL-MCNC: 7.4 G/DL (ref 6.3–8)
RBC # BLD AUTO: 4.07 10*6/MM3 (ref 4.14–5.8)
SODIUM SERPL-SCNC: 142 MMOL/L (ref 134–145)
WBC # BLD AUTO: 8.07 10*3/MM3 (ref 3.4–10.8)

## 2021-06-02 PROCEDURE — 99214 OFFICE O/P EST MOD 30 MIN: CPT | Performed by: INTERNAL MEDICINE

## 2021-06-02 PROCEDURE — 85025 COMPLETE CBC W/AUTO DIFF WBC: CPT

## 2021-06-02 PROCEDURE — 36415 COLL VENOUS BLD VENIPUNCTURE: CPT

## 2021-06-02 PROCEDURE — 80053 COMPREHEN METABOLIC PANEL: CPT

## 2021-06-14 ENCOUNTER — APPOINTMENT (OUTPATIENT)
Dept: RADIATION ONCOLOGY | Facility: HOSPITAL | Age: 71
End: 2021-06-14

## 2021-06-14 ENCOUNTER — CONSULT (OUTPATIENT)
Dept: RADIATION ONCOLOGY | Facility: HOSPITAL | Age: 71
End: 2021-06-14

## 2021-06-14 VITALS
WEIGHT: 215 LBS | DIASTOLIC BLOOD PRESSURE: 106 MMHG | OXYGEN SATURATION: 96 % | SYSTOLIC BLOOD PRESSURE: 158 MMHG | HEART RATE: 98 BPM | TEMPERATURE: 97.3 F | BODY MASS INDEX: 33.67 KG/M2

## 2021-06-14 DIAGNOSIS — C61 MALIGNANT NEOPLASM OF PROSTATE (HCC): Primary | ICD-10-CM

## 2021-06-14 PROCEDURE — 99203 OFFICE O/P NEW LOW 30 MIN: CPT | Performed by: RADIOLOGY

## 2021-06-14 PROCEDURE — G0463 HOSPITAL OUTPT CLINIC VISIT: HCPCS | Performed by: RADIOLOGY

## 2021-06-14 NOTE — PROGRESS NOTES
DIAGNOSIS and REASON FOR CONSULTATION:  Malignant neoplasm of prostate (CMS/HCC) - rcT0 cN1 cM0 - for advice and recommendations regarding the diagnosis    CHIEF COMPLAINT:  For advice and recommendations regarding Malignant neoplasm of prostate (CMS/HCC) - recurrent in left pelvic node  HISTORY OF PRESENT ILLNESS:  The patient is a 70 y.o. year old male who is seen by the [facility] physicians for hemochromatosis, undergoing phlebotomy for ferritin greater than 100, 2-3 times a year.  When he presented to Dr. Morales on June 2, 2021, he reported that he has had a rising PSA after definitive radiation therapy for an adenocarcinoma of the prostate.    He presented with a PSA of 5.4 in 2018 and biopsy showed a [place] 3+3 = 6 adenocarcinoma involving 1 core.  He was treated with definitive radiation therapy consisting of 7740 cGy in 43 treatments which he completed on October 26, 2018.    He did well until his PSA began to rise in January, 2020 when it returned at 1.38.  Recheck in August, 2020 showed it to be 3.39 and 4.5 by November, 2020.  He underwent ultrasound-guided biopsy of the prostate in January, 2021 which was benign.  Bone scan at that time was also negative.  Recent PSA from April 26, 2021 returned at 6.57.  CAT scan of the abdomen and pelvis on the same date showed a new enlarged inferior mesenteric node measuring 11 mm.  No additional abnormality was appreciated.    He underwent an Axumin scan on May 19, 2021 which showed a 12 mm mildly enlarged lymph node in the posterior aspect of the pelvis along the midline at the course of the inferior mesenteric vessels with fairly intense uptake consistent with localized metastatic disease.  No additional abnormality was appreciated elsewhere in the pelvis, abdomen, chest or neck.    He received his 1st ADT treatment on June 1, 2020 and I was asked to see the patient at the request of the referring provider noted above for advice and recommendations regarding this  diagnosis.     Clinically, he is doing very well. He denies any urinary or rectal complaints. His performance status remains excellent. We were joined by his wife for the consultation.        Performance Status: (0) Fully active, able to carry on all predisease performance without restriction  Objective   Past Medical History: he  has a past medical history of Atrial fibrillation (CMS/HCC), Atrial flutter (CMS/HCC), Diabetes (CMS/HCC), Gout, Heart disease, Hemochromatosis, History of syncope, Hyperlipidemia, Malignant neoplasm of prostate (CMS/HCC) (2018), RBBB (right bundle branch block), Sleep apnea, SSS (sick sinus syndrome) (CMS/HCC), and SVT (supraventricular tachycardia) (CMS/[facility]).    Past Surgical History:  he has a past surgical history that includes Knee surgery (Right); Pacemaker Implantation (05/2006); Cardiac catheterization; Cardiac pacemaker placement (2014); Cardiac Ablation (2010); and Cardiac Ablation (2012).    Meds:    Current Outpatient Medications:   •  allopurinol (ZYLOPRIM) 300 MG tablet, Take 300 mg by mouth Daily., Disp: , Rfl:   •  atorvastatin (LIPITOR) 40 MG tablet, Take 40 mg by mouth Daily., Disp: , Rfl:   •  lisinopril (PRINIVIL,ZESTRIL) 10 MG tablet, Take 10 mg by mouth Daily., Disp: , Rfl:   •  metFORMIN (GLUCOPHAGE) 500 MG tablet, Take 500 mg by mouth Daily With Breakfast., Disp: , Rfl:   •  metoprolol succinate XL (TOPROL-XL) 25 MG 24 hr tablet, Take 25 mg by mouth Daily., Disp: , Rfl:   •  warfarin (COUMADIN) 5 MG tablet, Take 5 mg by mouth Daily., Disp: , Rfl:   •  cyclobenzaprine (FLEXERIL) 10 MG tablet, , Disp: , Rfl:   •  Multiple Vitamins-Minerals (MULTIVITAMIN ADULT PO), Take  by mouth., Disp: , Rfl:     Allergies:  No Known Allergies    Family History:  his family history includes Breast cancer in his mother; Cancer in an other family member; Diabetes in an other family member; Heart disease in an other family member; Hemochromatosis in his father; Hypertension in an other  family member; Lymphoma in his father.    Social History:  he  reports that he has quit smoking. His smoking use included cigars. He has never used smokeless tobacco. He reports current alcohol use. He reports that he does not use drugs.    Pertinent Findings on   Review of Systems   Constitutional: Negative for appetite change, chills, diaphoresis, fatigue, fever and unexpected weight change.   HENT:   Negative for hearing loss, lump/mass, mouth sores, nosebleeds, sore throat, tinnitus, trouble swallowing and voice change.    Eyes: Negative for eye problems.   Respiratory: Negative for chest tightness, cough, hemoptysis, shortness of breath and wheezing.    Cardiovascular: Negative for chest pain, leg swelling and palpitations.   Gastrointestinal: Negative for abdominal distention, abdominal pain, blood in stool, constipation, diarrhea, nausea, rectal pain and vomiting.   Endocrine: Negative for hot flashes.   Genitourinary: Negative for bladder incontinence, difficulty urinating, dysuria, frequency, hematuria, nocturia and pelvic pain.    Musculoskeletal: Positive for arthralgias. Negative for back pain, flank pain, gait problem, myalgias, neck pain and neck stiffness.   Skin: Negative for itching and rash.   Neurological: Negative for dizziness, extremity weakness, gait problem, headaches, light-headedness, numbness, seizures and speech difficulty.   Hematological: Negative for adenopathy. Bruises/bleeds easily.   Psychiatric/Behavioral: Negative for confusion, decreased concentration, depression, sleep disturbance and suicidal ideas. The patient is not nervous/anxious.    :     Subjective   Vitals:    06/14/21 0925   BP: (!) 158/106   Pulse: 98   Temp: 97.3 °F (36.3 °C)   TempSrc: Temporal   SpO2: 96%   Weight: 97.5 kg (215 lb)   PainSc: 0-No pain       Pertinent Findings on:  Physical Exam  Vitals reviewed.   Constitutional:       Appearance: He is well-developed.   HENT:      Head: Normocephalic and  atraumatic.   Pulmonary:      Effort: Pulmonary effort is normal.   Abdominal:      Palpations: Abdomen is soft.   Musculoskeletal:         General: Normal range of motion.      Cervical back: Normal range of motion.   Skin:     General: Skin is warm and dry.   Neurological:      Mental Status: He is alert and oriented to person, place, and time.   Psychiatric:         Behavior: Behavior normal.         Thought Content: Thought content normal.         Judgment: Judgment normal.            Assessment: Malignant neoplasm of prostate (CMS/HCC) - now with rising PSA and heron recurrence on Axumin scan    Plan:   We discussed stereotactic ablative treatment of this heron lesion in addition to the hormone therapy. We talked thru the evolving literature regarding aggressive treatment of limited metastatic disease and the excellent local control rates after stereotactic treatment, specifically of isolated nodes and he asked appropriate qutesions which were answered.     I discussed a course of treatment consisting of 35 Gy in 5 treatments to the isolated node only. We discussed the importance of our treatment planning scan and developing a treatment plan. We will also need to recreate his previous treatments to the prostate.      We discussed the acute side effects of the course of treatment which should be minimal but could involve slight irritation of the skin and fatigue.  We discussed the possibility of some irritability of the bowel, diarrhea and increased gas production though I think the chances of that are small.  We also discussed the long-term effect of the radiation therapy on the surrounding bowel and all that we do in limiting that dose and I believe all questions were answered.     He wished to return to begin the treatment planning process. Once that is complete, we will recreate the prostate treatment, fuse the Axumin scan to those images and begin developing the treatment plan. We will then find an  agreeable time to begin when the treatment planning process is completed.         Objective     My assessment above comes from my review of the imaging, pathology, physician notes and other pertinent information as mentioned.    I personally spent greater than 30 minutes today assessing, managing, discussing and documenting my visit with the patient. That time includes review of records, imaging and pathology reports, obtaining my own history, performing a medically appropriate evaluation, counseling and educating the patient, discussing goals, logistics, alternatives and risks of my recommendations, surveillance options and potential outcomes. It also includes the time documenting the clinical information in the EMR and communicating my recommendations to the other involved physicians.    .

## 2021-06-18 PROCEDURE — 77334 RADIATION TREATMENT AID(S): CPT | Performed by: RADIOLOGY

## 2021-06-18 PROCEDURE — 77263 THER RADIOLOGY TX PLNG CPLX: CPT | Performed by: RADIOLOGY

## 2021-06-23 PROCEDURE — 77300 RADIATION THERAPY DOSE PLAN: CPT | Performed by: RADIOLOGY

## 2021-06-23 PROCEDURE — 77338 DESIGN MLC DEVICE FOR IMRT: CPT | Performed by: RADIOLOGY

## 2021-06-23 PROCEDURE — 77293 RESPIRATOR MOTION MGMT SIMUL: CPT | Performed by: RADIOLOGY

## 2021-06-23 PROCEDURE — 77301 RADIOTHERAPY DOSE PLAN IMRT: CPT | Performed by: RADIOLOGY

## 2021-06-25 ENCOUNTER — RADIATION ONCOLOGY WEEKLY ASSESSMENT (OUTPATIENT)
Dept: RADIATION ONCOLOGY | Facility: HOSPITAL | Age: 71
End: 2021-06-25

## 2021-06-25 DIAGNOSIS — C61 MALIGNANT NEOPLASM OF PROSTATE METASTATIC TO INTRAPELVIC LYMPH NODE (HCC): Primary | ICD-10-CM

## 2021-06-25 DIAGNOSIS — C77.5 MALIGNANT NEOPLASM OF PROSTATE METASTATIC TO INTRAPELVIC LYMPH NODE (HCC): Primary | ICD-10-CM

## 2021-06-25 PROCEDURE — 77435 SBRT MANAGEMENT: CPT | Performed by: RADIOLOGY

## 2021-06-25 PROCEDURE — 77373 STRTCTC BDY RAD THER TX DLVR: CPT | Performed by: RADIOLOGY

## 2021-06-25 NOTE — PROGRESS NOTES
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    Treatment Number and Dose: 1/5 - 700/3500    Notes on treatment course, films, medical progress and plan:    Doing well. Tolerated treatment without difficulty. No problems or questions.    Subjective     I was personally present at the machine for the delivery of the above treatment.     I provided direct supervision of the patient's set up, treatment parameters and image guidance. I provided corrections and approval of the above prior to the treatment, in real time. I was present for any adjustments required due to patient motion, target movement or equipment issues.    The ongoing images for localization, tumor tracking, gating and beam's eye view localization images will also be approved.     Problem added:  None  Medications added:   None  Ancillary referrals made: None    I have reviewed and marked as \"reviewed\" the current medications, allergies and problem list in the patients EMR.          "

## 2021-06-28 ENCOUNTER — RADIATION ONCOLOGY WEEKLY ASSESSMENT (OUTPATIENT)
Dept: RADIATION ONCOLOGY | Facility: HOSPITAL | Age: 71
End: 2021-06-28

## 2021-06-28 DIAGNOSIS — C61 MALIGNANT NEOPLASM OF PROSTATE METASTATIC TO INTRAPELVIC LYMPH NODE (HCC): Primary | ICD-10-CM

## 2021-06-28 DIAGNOSIS — C77.5 MALIGNANT NEOPLASM OF PROSTATE METASTATIC TO INTRAPELVIC LYMPH NODE (HCC): Primary | ICD-10-CM

## 2021-06-28 PROCEDURE — 77373 STRTCTC BDY RAD THER TX DLVR: CPT | Performed by: RADIOLOGY

## 2021-06-28 PROCEDURE — FACE2FACE: Performed by: RADIOLOGY

## 2021-06-28 NOTE — PROGRESS NOTES
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    Treatment Number and Dose:    Treatment #2/5    Notes on treatment course, films, medical progress and plan:    Doing well. Tolerated treatment without difficulty. No problems or questions.    Subjective     I was personally present at the machine for the delivery of the above treatment.     I provided direct supervision of the patient's set up, treatment parameters and image guidance. I provided corrections and approval of the above prior to the treatment, in real time. I was present for any adjustments required due to patient motion, target movement or equipment issues.    The ongoing images for localization, tumor tracking, gating and beam's eye view localization images will also be approved.     Problem added:  None  Medications added:   None  Ancillary referrals made: None    I have reviewed and marked as \"reviewed\" the current medications, allergies and problem list in the patients EMR.          "

## 2021-06-29 PROCEDURE — 77336 RADIATION PHYSICS CONSULT: CPT | Performed by: RADIOLOGY

## 2021-06-30 ENCOUNTER — RADIATION ONCOLOGY WEEKLY ASSESSMENT (OUTPATIENT)
Dept: RADIATION ONCOLOGY | Facility: HOSPITAL | Age: 71
End: 2021-06-30

## 2021-06-30 DIAGNOSIS — C77.5 MALIGNANT NEOPLASM OF PROSTATE METASTATIC TO INTRAPELVIC LYMPH NODE (HCC): Primary | ICD-10-CM

## 2021-06-30 DIAGNOSIS — C61 MALIGNANT NEOPLASM OF PROSTATE METASTATIC TO INTRAPELVIC LYMPH NODE (HCC): Primary | ICD-10-CM

## 2021-06-30 PROCEDURE — 77373 STRTCTC BDY RAD THER TX DLVR: CPT | Performed by: RADIOLOGY

## 2021-06-30 NOTE — PROGRESS NOTES
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    Treatment Number and Dose:fx 3/5 sbrt pelvic node 21Gy/35Gy    Notes on treatment course, films, medical progress and plan:    Doing well. Tolerated treatment without difficulty. No problems or questions.    Subjective     I was personally present at the machine for the delivery of the above treatment.     I provided direct supervision of the patient's set up, treatment parameters and image guidance. I provided corrections and approval of the above prior to the treatment, in real time. I was present for any adjustments required due to patient motion, target movement or equipment issues.    The ongoing images for localization, tumor tracking, gating and beam's eye view localization images will also be approved.     Problem added:  None  Medications added:   None  Ancillary referrals made: None    I have reviewed the current medications, allergies and problem list in the patients EMR.

## 2021-07-01 ENCOUNTER — APPOINTMENT (OUTPATIENT)
Dept: RADIATION ONCOLOGY | Facility: HOSPITAL | Age: 71
End: 2021-07-01

## 2021-07-02 ENCOUNTER — RADIATION ONCOLOGY WEEKLY ASSESSMENT (OUTPATIENT)
Dept: RADIATION ONCOLOGY | Facility: HOSPITAL | Age: 71
End: 2021-07-02

## 2021-07-02 DIAGNOSIS — C61 MALIGNANT NEOPLASM OF PROSTATE METASTATIC TO INTRAPELVIC LYMPH NODE (HCC): Primary | ICD-10-CM

## 2021-07-02 DIAGNOSIS — C77.5 MALIGNANT NEOPLASM OF PROSTATE METASTATIC TO INTRAPELVIC LYMPH NODE (HCC): Primary | ICD-10-CM

## 2021-07-02 PROCEDURE — 77373 STRTCTC BDY RAD THER TX DLVR: CPT | Performed by: RADIOLOGY

## 2021-07-02 NOTE — PROGRESS NOTES
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    Treatment Number and Dose: 4/5 - 2800/3500    Notes on treatment course, films, medical progress and plan:    Doing well.  Bladder full on initial scan, displaced structures slightly. Got patient up and emptied bladder, line up improve and treatment delivered. Tolerated treatment without difficulty. No problems or questions.    Subjective     I was personally present at the machine for the delivery of the above treatment.     I provided direct supervision of the patient's set up, treatment parameters and image guidance. I provided corrections and approval of the above prior to the treatment, in real time. I was present for any adjustments required due to patient motion, target movement or equipment issues.    The ongoing images for localization, tumor tracking, gating and beam's eye view localization images will also be approved.     Problem added:  None  Medications added:   None  Ancillary referrals made: None    I have reviewed the current medications, allergies and problem list in the patients EMR.

## 2021-07-06 ENCOUNTER — RADIATION ONCOLOGY WEEKLY ASSESSMENT (OUTPATIENT)
Dept: RADIATION ONCOLOGY | Facility: HOSPITAL | Age: 71
End: 2021-07-06

## 2021-07-06 ENCOUNTER — DOCUMENTATION (OUTPATIENT)
Dept: RADIATION ONCOLOGY | Facility: HOSPITAL | Age: 71
End: 2021-07-06

## 2021-07-06 DIAGNOSIS — C77.5 MALIGNANT NEOPLASM OF PROSTATE METASTATIC TO INTRAPELVIC LYMPH NODE (HCC): Primary | ICD-10-CM

## 2021-07-06 DIAGNOSIS — C61 MALIGNANT NEOPLASM OF PROSTATE METASTATIC TO INTRAPELVIC LYMPH NODE (HCC): Primary | ICD-10-CM

## 2021-07-06 PROCEDURE — FACE2FACE: Performed by: RADIOLOGY

## 2021-07-06 NOTE — PROGRESS NOTES
Physician Weekly Management Note    Diagnosis:     Diagnosis Plan   1. 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   He plans on postponing resumption until Monday 7/12.      [facility] ECOG Status: (0) Fully active, able to carry on all predisease performance without restriction      Weekly Management:  Medication reviewed?   Yes  New medications given?   No  Problemlist reviewed?   Yes  Problem added?   No      Technical aspects reviewed:  Weekly OBI approved?   Yes  Weekly port films approved?   Yes  Change requests noted on port film?   No  Patient setup and plan reviewed?   Yes    Chart Reviewed:  Continue current treatment plan?   Yes  Treatment plan change requested?   No  CBC reviewed?   No  Concurrent Chemo?   No    Objective     Toxicities:   As above     Review of Systems   As above    There were no vitals filed for this visit.    Current Status 6/2/2021   ECOG score 0       Physical Exam  As above      Problem Summary List    Diagnosis:     Diagnosis Plan   1. 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 (CMS/HCC)       Pathology:       Past Medical History:   Diagnosis Date   • Atrial fibrillation (CMS/HCC)    • Atrial flutter (CMS/HCC)    • Diabetes (CMS/HCC)    • Gout    • Heart disease     Status post pacemaker implantation   • Hemochromatosis    • History of syncope    • Hyperlipidemia    • Malignant neoplasm of prostate (CMS/HCC) 2018   • RBBB (right bundle branch block)    • Sleep apnea    • SSS (sick sinus syndrome) (CMS/HCC)    • SVT (supraventricular tachycardia) (CMS/HCC)          Past Surgical History:   Procedure Laterality Date   • CARDIAC ABLATION  2010    Paroxysmal atrial fib/Dr. Koo   • CARDIAC  ABLATION  2012    Recurrent atrial flutter/Dr. Koo   • CARDIAC CATHETERIZATION     • CARDIAC PACEMAKER PLACEMENT  2014    Medtronic dual chamber/Dr. Manriquez   • KNEE SURGERY Right    • PACEMAKER IMPLANTATION  05/2006         Current Outpatient Medications on File Prior to Visit   Medication Sig Dispense Refill   • allopurinol (ZYLOPRIM) 300 MG tablet Take 300 mg by mouth Daily.     • atorvastatin (LIPITOR) 40 MG tablet Take 40 mg by mouth Daily.     • cyclobenzaprine (FLEXERIL) 10 MG tablet      • lisinopril (PRINIVIL,ZESTRIL) 10 MG tablet Take 10 mg by mouth Daily.     • metFORMIN (GLUCOPHAGE) 500 MG tablet Take 500 mg by mouth Daily With Breakfast.     • metoprolol succinate XL (TOPROL-XL) 25 MG 24 hr tablet Take 25 mg by mouth Daily.     • Multiple Vitamins-Minerals (MULTIVITAMIN ADULT PO) Take  by mouth.     • warfarin (COUMADIN) 5 MG tablet Take 5 mg by mouth Daily.       No current facility-administered medications on file prior to visit.       No Known Allergies     Primary care MD:    Ari Lopez Jr., MD    Oncologist:  Patient Care Team:  Jewel Morales MD as Consulting Physician (Hematology and Oncology)  Neva Madrigal MD as Consulting Physician (Radiation Oncology)       Seen and approved by:  Jorge Dyer MD  07/06/2021

## 2021-07-12 ENCOUNTER — DOCUMENTATION (OUTPATIENT)
Dept: RADIATION ONCOLOGY | Facility: HOSPITAL | Age: 71
End: 2021-07-12

## 2021-07-12 NOTE — PROGRESS NOTES
Spoke with patient by phone this morning - he states erythematous area on abdomen is better, dry in groin. He has no history of trauma to the area or other etiology for the skin reaction.    I discussed with him all the work we have done on his plan since his visit last week. We re-ran the plan specific  check which passed, we verified all the machine parameters for the treatments delivered in both the record and verify systems as well as with a thorough review of the plan. I talked thru with him the small treatment field of 3 x 3 cm which is delivered thru an arc, minimizing localized dose to any structures and specifically skin. His skin reaction spans over 20 cm and when overlying the small treatment field on his anatomy, it actually lies in an area of lighter erythema on the skin.    I shared with him that I am unsure of the etiology of the skin reaction. I researched the hormone injection and possible reactions to that. He is on blood thinner but denies any trauma. We discussed minimal trauma can cause significant bruising and he admits to that previously.     He asked appropriate questions and would like to wait until Wednesday to complete his last treatment as he does feel the area is improving. Will plan to finish treatment then and he knows to call if he has any other problems prior.

## 2021-07-13 PROCEDURE — 77470 SPECIAL RADIATION TREATMENT: CPT | Performed by: RADIOLOGY

## 2021-07-14 ENCOUNTER — RADIATION ONCOLOGY WEEKLY ASSESSMENT (OUTPATIENT)
Dept: RADIATION ONCOLOGY | Facility: HOSPITAL | Age: 71
End: 2021-07-14

## 2021-07-14 ENCOUNTER — DOCUMENTATION (OUTPATIENT)
Dept: RADIATION ONCOLOGY | Facility: HOSPITAL | Age: 71
End: 2021-07-14

## 2021-07-14 DIAGNOSIS — C61 MALIGNANT NEOPLASM OF PROSTATE METASTATIC TO INTRAPELVIC LYMPH NODE (HCC): Primary | ICD-10-CM

## 2021-07-14 DIAGNOSIS — C77.5 MALIGNANT NEOPLASM OF PROSTATE METASTATIC TO INTRAPELVIC LYMPH NODE (HCC): Primary | ICD-10-CM

## 2021-07-14 PROCEDURE — 77373 STRTCTC BDY RAD THER TX DLVR: CPT | Performed by: RADIOLOGY

## 2021-07-14 NOTE — PROGRESS NOTES
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    Treatment Number and Dose:fx 5/5 sbrt right pelvic lymph  Node 35/35Gy    Notes on treatment course, films, medical progress and plan:    Patient was placed in treatment position and CBCT images were acquired for target localization. Adjustments were made to ensure the set up agreed with the computerized stereotactic plan. All images were approved prior to treatment starting.     The patient did well, tolerated treatment without difficulty. No problems or questions.     Subjective     I was personally present at the machine for the set up and delivery of the above treatment. The Medical Physicist was also in attendance during patient set up, verification and treatment delivery to ensure the parameters agreed with the treatment plan.     I provided direct supervision of the patient's set up, treatment parameters and image guidance. I provided corrections and approval of the above prior to the treatment, in real time. I was present for any adjustments required due to patient motion, target movement or equipment issues.    The ongoing images for localization, tumor tracking, gating and beam's eye view localization images will also be approved.     Problem added:  None  Medications added:   None  Ancillary referrals made: None    I have reviewed the current medications, allergies and problem list in the patients EMR.

## 2021-07-20 NOTE — PROGRESS NOTES
RADIATION THERAPY TREATMENT SUMMARY  Patient: Loyd Avalos  YOB: 1950  Diagnosis:    Malignant neoplasm of prostate (CMS/HCC)  Stage JENNIE (rcT0, cN1, cM0)     Loyd Avalos has recently completed the course of radiation therapy prescribed for the above-mentioned diagnosis.  Below, please find the specifics of the course of treatment delivered:    Radiation Details:    Tx Start Date  6/25/2021   Tx End date  7/14/2021   Intent   Curative    Total Treatments 5    Prescription Name SBRT PELVIC NODE  Site   Abdomen  Primary/Boost  Primary  Dose Per Fraction 700 cGy/Frac  Number of Fractions 5  Prescribe To  Volume GTV PELVIC NODE  3500 cGy  700 cGy/Frac  Mode    Photon  Technique  Stereotactic  Frequency  Once Daily  Energy   6FFF    Tolerance and Toxicities: he tolerated the treatments well but presented with an extensive skin reaction over there right abdomen before the final treatment. We pursued etiologies extensively but were never able to determine the cause - should not have been from this small field of radiation and skin reaction did not line up with small treatment portal. Regardless, we postponed the final fraction so he completed the 5 treatments in 19 elapsed days.    Follow-up Plans:  I have asked the patient to return to see me in approximately 3 months and will obtain imaging prior.

## 2021-07-23 DIAGNOSIS — C77.5 MALIGNANT NEOPLASM OF PROSTATE METASTATIC TO INTRAPELVIC LYMPH NODE (HCC): Primary | ICD-10-CM

## 2021-07-23 DIAGNOSIS — C61 MALIGNANT NEOPLASM OF PROSTATE METASTATIC TO INTRAPELVIC LYMPH NODE (HCC): Primary | ICD-10-CM

## 2021-08-10 ENCOUNTER — CLINICAL SUPPORT (OUTPATIENT)
Dept: ORTHOPEDIC SURGERY | Facility: CLINIC | Age: 71
End: 2021-08-10

## 2021-08-10 VITALS — BODY MASS INDEX: 32.18 KG/M2 | HEIGHT: 67 IN | TEMPERATURE: 97.8 F | WEIGHT: 205 LBS

## 2021-08-10 DIAGNOSIS — M17.12 ARTHRITIS OF LEFT KNEE: Primary | ICD-10-CM

## 2021-08-10 PROCEDURE — 99212 OFFICE O/P EST SF 10 MIN: CPT | Performed by: NURSE PRACTITIONER

## 2021-08-10 PROCEDURE — 20610 DRAIN/INJ JOINT/BURSA W/O US: CPT | Performed by: NURSE PRACTITIONER

## 2021-08-10 RX ORDER — LIDOCAINE HYDROCHLORIDE 10 MG/ML
4 INJECTION, SOLUTION EPIDURAL; INFILTRATION; INTRACAUDAL; PERINEURAL
Status: COMPLETED | OUTPATIENT
Start: 2021-08-10 | End: 2021-08-10

## 2021-08-10 RX ORDER — METHYLPREDNISOLONE ACETATE 80 MG/ML
80 INJECTION, SUSPENSION INTRA-ARTICULAR; INTRALESIONAL; INTRAMUSCULAR; SOFT TISSUE
Status: COMPLETED | OUTPATIENT
Start: 2021-08-10 | End: 2021-08-10

## 2021-08-10 RX ADMIN — LIDOCAINE HYDROCHLORIDE 4 ML: 10 INJECTION, SOLUTION EPIDURAL; INFILTRATION; INTRACAUDAL; PERINEURAL at 10:26

## 2021-08-10 RX ADMIN — METHYLPREDNISOLONE ACETATE 80 MG: 80 INJECTION, SUSPENSION INTRA-ARTICULAR; INTRALESIONAL; INTRAMUSCULAR; SOFT TISSUE at 10:26

## 2021-08-10 NOTE — PROGRESS NOTES
Patient Name: Loyd Avalos   YOB: 1950  Referring Primary Care Physician: Ari Lopez Jr., MD  BMI: Body mass index is 32.11 kg/m².    Chief Complaint:    Chief Complaint   Patient presents with   • Left Knee - Follow-up, Pain        HPI:     Loyd Avalos is a 71 y.o. male who presents today for evaluation of   Chief Complaint   Patient presents with   • Left Knee - Follow-up, Pain   . Patient follows up for re-evaluation of left knee pain. He has known history of osteoarthritis of the left knee and complains of a generalized, achy pain in the knee with stiffness and intermittent swelling. No locking or catching. No new injury/trauma. He does not take any medications at home and rarely uses any ice, heat, or topical liniments. He has had cortisone injections in the past which were effective in relieving his pain. His right knee does not bother him.      Subjective   Medications:   Home Medications:  Current Outpatient Medications on File Prior to Visit   Medication Sig   • allopurinol (ZYLOPRIM) 300 MG tablet Take 300 mg by mouth Daily.   • atorvastatin (LIPITOR) 40 MG tablet Take 40 mg by mouth Daily.   • lisinopril (PRINIVIL,ZESTRIL) 10 MG tablet Take 10 mg by mouth Daily.   • metFORMIN (GLUCOPHAGE) 500 MG tablet Take 500 mg by mouth Daily With Breakfast.   • metoprolol succinate XL (TOPROL-XL) 25 MG 24 hr tablet Take 25 mg by mouth Daily.   • Multiple Vitamins-Minerals (MULTIVITAMIN ADULT PO) Take  by mouth.   • warfarin (COUMADIN) 5 MG tablet Take 5 mg by mouth Daily.   • cyclobenzaprine (FLEXERIL) 10 MG tablet      No current facility-administered medications on file prior to visit.     Current Medications:  Scheduled Meds:  Continuous Infusions:No current facility-administered medications for this visit.    PRN Meds:.    I have reviewed the patient's medical history in detail and updated the computerized patient record.  Review and summarization of old records includes:    Past  "Medical History:   Diagnosis Date   • Atrial fibrillation (CMS/HCC)    • Atrial flutter (CMS/HCC)    • Diabetes (CMS/HCC)    • Gout    • Heart disease     Status post pacemaker implantation   • Hemochromatosis    • History of syncope    • Hyperlipidemia    • Malignant neoplasm of prostate (CMS/HCC) 2018   • RBBB (right bundle branch block)    • Sleep apnea    • SSS (sick sinus syndrome) (CMS/HCC)    • SVT (supraventricular tachycardia) (CMS/HCC)         Past Surgical History:   Procedure Laterality Date   • CARDIAC ABLATION  2010    Paroxysmal atrial fib/Dr. Koo   • CARDIAC ABLATION  2012    Recurrent atrial flutter/Dr. Koo   • CARDIAC CATHETERIZATION     • CARDIAC PACEMAKER PLACEMENT  2014    Medtronic dual chamber/Dr. Manriquez   • KNEE SURGERY Right    • PACEMAKER IMPLANTATION  05/2006        Social History     Occupational History     Employer: RETIRED   Tobacco Use   • Smoking status: Former Smoker     Types: Cigars   • Smokeless tobacco: Never Used   Vaping Use   • Vaping Use: Never used   Substance and Sexual Activity   • Alcohol use: Yes     Comment: Moderate \"few beers daily\"   • Drug use: No   • Sexual activity: Defer      Social History     Social History Narrative   • Not on file        Family History   Problem Relation Age of Onset   • Cancer Other         LYMPH & BREAST   • Diabetes Other    • Hypertension Other    • Heart disease Other    • Breast cancer Mother    • Lymphoma Father    • Hemochromatosis Father        ROS: 14 point review of systems was performed and all other systems were reviewed and are negative except for documented findings in HPI and today's encounter.     Allergies: No Known Allergies  Constitutional:  Denies fever, shaking or chills   Eyes:  Denies change in visual acuity   HENT:  Denies nasal congestion or sore throat   Respiratory:  Denies cough or shortness of breath   Cardiovascular:  Denies chest pain or severe LE edema   GI:  Denies abdominal pain, nausea, vomiting, " "bloody stools or diarrhea   Musculoskeletal:  Numbness, tingling, pain, or loss of motor function only as noted above in history of present illness.  : Denies painful urination or hematuria  Integument:  Denies rash, lesion or ulceration   Neurologic:  Denies headache or focal weakness  Endocrine:  Denies lymphadenopathy  Psych:  Denies confusion or change in mental status   Hem:  Denies active bleeding    OBJECTIVE:  Physical Exam: 71 y.o. male  Wt Readings from Last 3 Encounters:   08/10/21 93 kg (205 lb)   06/14/21 97.5 kg (215 lb)   06/02/21 96.5 kg (212 lb 11.2 oz)     Ht Readings from Last 1 Encounters:   08/10/21 170.2 cm (67\")     Body mass index is 32.11 kg/m².  Vitals:    08/10/21 1026   Temp: 97.8 °F (36.6 °C)     Vital signs reviewed.     General Appearance:    Alert, cooperative, in no acute distress                  Eyes: conjunctiva clear  ENT: external ears and nose atraumatic  CV: no peripheral edema  Resp: normal respiratory effort  Skin: no rashes or wounds; normal turgor  Psych: mood and affect appropriate  Lymph: no nodes appreciated  Neuro: gross sensation intact  Vascular:  Palpable peripheral pulse in noted extremity  Musculoskeletal Extremities: left knee with medial and lateral joint line tenderness, swelling, synovitis, patellofemoral crepitation. No ligamentous instability. Calf soft, non-tender. Skin clean, intact with no rash or lesions.    Radiology:   No xrays obtained today. Reviewed AP, lateral, 40 degree PA left knee from 3/5/2021 which shows advanced osteoarthritis of the left knee     Assessment:     ICD-10-CM ICD-9-CM   1. Arthritis of left knee  M17.12 716.96           MDM/Plan:   The diagnosis(es), natural history, pathophysiology and treatment for diagnosis(es) were discussed. Opportunity given and questions answered.  Biomechanics of pertinent body areas discussed.  When appropriate, the use of ambulatory aids discussed.  EXERCISES:  Advice on benefits of, and types of " regular/moderate exercise pertaining to orthopedic diagnosis(es).  MEDICATIONS:  The risks, benefits, warnings,side effects and alternatives of medications discussed.  Inflammation/pain control; with cold, heat, elevation and/or liniments discussed as appropriate  Cortisone Injection. See procedure note.    Large Joint Arthrocentesis: L knee  Date/Time: 8/10/2021 10:26 AM  Consent given by: patient  Site marked: site marked  Timeout: Immediately prior to procedure a time out was called to verify the correct patient, procedure, equipment, support staff and site/side marked as required   Supporting Documentation  Indications: pain   Procedure Details  Location: knee - L knee  Preparation: Patient was prepped and draped in the usual sterile fashion  Needle gauge: 21G.  Approach: anterolateral  Medications administered: 80 mg methylPREDNISolone acetate 80 MG/ML; 4 mL lidocaine PF 1% 1 %  Patient tolerance: patient tolerated the procedure well with no immediate complications            8/10/2021    Much of this encounter note is an electronic transcription/translation of spoken language to printed text. The electronic translation of spoken language may permit erroneous, or at times, nonsensical words or phrases to be inadvertently transcribed; Although I have reviewed the note for such errors, some may still exist

## 2021-08-26 ENCOUNTER — LAB (OUTPATIENT)
Dept: LAB | Facility: HOSPITAL | Age: 71
End: 2021-08-26

## 2021-08-26 ENCOUNTER — OFFICE VISIT (OUTPATIENT)
Dept: ONCOLOGY | Facility: CLINIC | Age: 71
End: 2021-08-26

## 2021-08-26 VITALS
TEMPERATURE: 97.1 F | HEIGHT: 67 IN | DIASTOLIC BLOOD PRESSURE: 92 MMHG | RESPIRATION RATE: 16 BRPM | HEART RATE: 139 BPM | SYSTOLIC BLOOD PRESSURE: 127 MMHG | WEIGHT: 207.9 LBS | OXYGEN SATURATION: 97 % | BODY MASS INDEX: 32.63 KG/M2

## 2021-08-26 DIAGNOSIS — C77.5 MALIGNANT NEOPLASM OF PROSTATE METASTATIC TO INTRAPELVIC LYMPH NODE (HCC): Primary | ICD-10-CM

## 2021-08-26 DIAGNOSIS — I48.11 LONGSTANDING PERSISTENT ATRIAL FIBRILLATION (HCC): ICD-10-CM

## 2021-08-26 DIAGNOSIS — C61 MALIGNANT NEOPLASM OF PROSTATE (HCC): ICD-10-CM

## 2021-08-26 DIAGNOSIS — C61 MALIGNANT NEOPLASM OF PROSTATE METASTATIC TO INTRAPELVIC LYMPH NODE (HCC): Primary | ICD-10-CM

## 2021-08-26 LAB
ALBUMIN SERPL-MCNC: 4.4 G/DL (ref 3.5–5.2)
ALBUMIN/GLOB SERPL: 1.4 G/DL (ref 1.1–2.4)
ALP SERPL-CCNC: 76 U/L (ref 38–116)
ALT SERPL W P-5'-P-CCNC: 50 U/L (ref 0–41)
ANION GAP SERPL CALCULATED.3IONS-SCNC: 12.9 MMOL/L (ref 5–15)
AST SERPL-CCNC: 47 U/L (ref 0–40)
BASOPHILS # BLD AUTO: 0.08 10*3/MM3 (ref 0–0.2)
BASOPHILS NFR BLD AUTO: 0.9 % (ref 0–1.5)
BILIRUB SERPL-MCNC: 0.3 MG/DL (ref 0.2–1.2)
BUN SERPL-MCNC: 18 MG/DL (ref 6–20)
BUN/CREAT SERPL: 18.2 (ref 7.3–30)
CALCIUM SPEC-SCNC: 9.5 MG/DL (ref 8.5–10.2)
CHLORIDE SERPL-SCNC: 104 MMOL/L (ref 98–107)
CO2 SERPL-SCNC: 22.1 MMOL/L (ref 22–29)
CREAT SERPL-MCNC: 0.99 MG/DL (ref 0.7–1.3)
DEPRECATED RDW RBC AUTO: 45.1 FL (ref 37–54)
EOSINOPHIL # BLD AUTO: 0.51 10*3/MM3 (ref 0–0.4)
EOSINOPHIL NFR BLD AUTO: 5.8 % (ref 0.3–6.2)
ERYTHROCYTE [DISTWIDTH] IN BLOOD BY AUTOMATED COUNT: 12.5 % (ref 12.3–15.4)
GFR SERPL CREATININE-BSD FRML MDRD: 75 ML/MIN/1.73
GLOBULIN UR ELPH-MCNC: 3.2 GM/DL (ref 1.8–3.5)
GLUCOSE SERPL-MCNC: 110 MG/DL (ref 74–124)
HCT VFR BLD AUTO: 44.2 % (ref 37.5–51)
HGB BLD-MCNC: 15 G/DL (ref 13–17.7)
IMM GRANULOCYTES # BLD AUTO: 0.02 10*3/MM3 (ref 0–0.05)
IMM GRANULOCYTES NFR BLD AUTO: 0.2 % (ref 0–0.5)
LYMPHOCYTES # BLD AUTO: 2.8 10*3/MM3 (ref 0.7–3.1)
LYMPHOCYTES NFR BLD AUTO: 32.1 % (ref 19.6–45.3)
MCH RBC QN AUTO: 33.3 PG (ref 26.6–33)
MCHC RBC AUTO-ENTMCNC: 33.9 G/DL (ref 31.5–35.7)
MCV RBC AUTO: 98.2 FL (ref 79–97)
MONOCYTES # BLD AUTO: 0.89 10*3/MM3 (ref 0.1–0.9)
MONOCYTES NFR BLD AUTO: 10.2 % (ref 5–12)
NEUTROPHILS NFR BLD AUTO: 4.42 10*3/MM3 (ref 1.7–7)
NEUTROPHILS NFR BLD AUTO: 50.8 % (ref 42.7–76)
NRBC BLD AUTO-RTO: 0 /100 WBC (ref 0–0.2)
PLATELET # BLD AUTO: 191 10*3/MM3 (ref 140–450)
PMV BLD AUTO: 9 FL (ref 6–12)
POTASSIUM SERPL-SCNC: 5.1 MMOL/L (ref 3.5–4.7)
PROT SERPL-MCNC: 7.6 G/DL (ref 6.3–8)
PSA SERPL-MCNC: 0.17 NG/ML (ref 0–4)
RBC # BLD AUTO: 4.5 10*6/MM3 (ref 4.14–5.8)
SODIUM SERPL-SCNC: 139 MMOL/L (ref 134–145)
WBC # BLD AUTO: 8.72 10*3/MM3 (ref 3.4–10.8)

## 2021-08-26 PROCEDURE — 85025 COMPLETE CBC W/AUTO DIFF WBC: CPT

## 2021-08-26 PROCEDURE — 36415 COLL VENOUS BLD VENIPUNCTURE: CPT

## 2021-08-26 PROCEDURE — 80053 COMPREHEN METABOLIC PANEL: CPT

## 2021-08-26 PROCEDURE — 84153 ASSAY OF PSA TOTAL: CPT | Performed by: INTERNAL MEDICINE

## 2021-08-26 PROCEDURE — 99214 OFFICE O/P EST MOD 30 MIN: CPT | Performed by: INTERNAL MEDICINE

## 2021-08-26 NOTE — PROGRESS NOTES
REASONS FOR FOLLOWUP:    Hemochromatosis    HISTORY OF PRESENT ILLNESS:  The patient is a 71 y.o. year old male with hemochromatosis, undergoing phlebotomy for ferritin rater than 100.  Historically he has required phlebotomies 2-3 times a year.  He last underwent phlebotomy in October 2020 for ferritin of 103.    As he is reviewed back today he tells me his PSA has been going up recently after his prostate cancer radiation 3 years ago and Dr. abdullahi biopsied his prostate and this was negative and did a CAT scan of the abdomen which showed a mildly enlarged lymph node in the inferior mesenteric vein area which is new PSA is up to 8.5    his Axumin PET scan showed  1 heron metastasis in the IMV area  oligometastatic and he got his first dose of his ADT yesterday and completed radiation to the isolated lymph node metastasis    Complains of hot flashes from his Lupron but otherwise no new complaints-his PSA has come down to 0.27 from 8.5    Patient does have atrial fibrillation, now on anticoagulation with warfarin.  INR is followed by his cardiologist.-Heart rate is very elevated today and he is scheduled to go over to the clinic to be evaluated by the cardiologist.  He thinks he may have forgotten to take his metoprolol last night    He denies other concerns at this time.  He denies bone pains weight loss or other systemic complaints    Past Medical History:   Diagnosis Date   • Atrial fibrillation (CMS/HCC)    • Atrial flutter (CMS/HCC)    • Diabetes (CMS/HCC)    • Gout    • Heart disease     Status post pacemaker implantation   • Hemochromatosis    • History of syncope    • Hyperlipidemia    • Malignant neoplasm of prostate (CMS/HCC) 2018   • RBBB (right bundle branch block)    • Sleep apnea    • SSS (sick sinus syndrome) (CMS/HCC)    • SVT (supraventricular tachycardia) (CMS/HCC)      Past Surgical History:   Procedure Laterality Date   • CARDIAC ABLATION  2010    Paroxysmal atrial fib/Dr. Koo   • CARDIAC ABLATION   "2012    Recurrent atrial flutter/Dr. Koo   • CARDIAC CATHETERIZATION     • CARDIAC PACEMAKER PLACEMENT  2014    Medtronic dual chamber/Dr. Manriquez   • KNEE SURGERY Right    • PACEMAKER IMPLANTATION  05/2006       HEMATOLOGIC/ONCOLOGIC HISTORY:  (History from previous dates can be found in the separate document.)    MEDICATIONS    Current Outpatient Medications:   •  allopurinol (ZYLOPRIM) 300 MG tablet, Take 300 mg by mouth Daily., Disp: , Rfl:   •  atorvastatin (LIPITOR) 40 MG tablet, Take 40 mg by mouth Daily., Disp: , Rfl:   •  cyclobenzaprine (FLEXERIL) 10 MG tablet, , Disp: , Rfl:   •  lisinopril (PRINIVIL,ZESTRIL) 10 MG tablet, Take 10 mg by mouth Daily., Disp: , Rfl:   •  metFORMIN (GLUCOPHAGE) 500 MG tablet, Take 500 mg by mouth Daily With Breakfast., Disp: , Rfl:   •  metoprolol succinate XL (TOPROL-XL) 25 MG 24 hr tablet, Take 25 mg by mouth Daily., Disp: , Rfl:   •  Multiple Vitamins-Minerals (MULTIVITAMIN ADULT PO), Take  by mouth., Disp: , Rfl:   •  warfarin (COUMADIN) 5 MG tablet, Take 5 mg by mouth Daily., Disp: , Rfl:     ALLERGIES:   No Known Allergies    SOCIAL HISTORY:       Social History     Socioeconomic History   • Marital status:      Spouse name: Suzie   • Number of children: Not on file   • Years of education: High School   • Highest education level: Not on file   Tobacco Use   • Smoking status: Former Smoker     Types: Cigars   • Smokeless tobacco: Never Used   Vaping Use   • Vaping Use: Never used   Substance and Sexual Activity   • Alcohol use: Yes     Comment: Moderate \"few beers daily\"   • Drug use: No   • Sexual activity: Defer         FAMILY HISTORY:  Family History   Problem Relation Age of Onset   • Cancer Other         LYMPH & BREAST   • Diabetes Other    • Hypertension Other    • Heart disease Other    • Breast cancer Mother    • Lymphoma Father    • Hemochromatosis Father        REVIEW OF SYSTEMS:  GENERAL: No change in appetite or weight; fatigue  Hot flashes on " "lupron.    SKIN: No nonhealing lesions.   No rashes.  HEME/LYMPH: No swollen nodes.   EYES: No vision changes or diplopia.   ENT: No tinnitus, hearing loss, gum bleeding, epistaxis, hoarseness or dysphagia.   RESPIRATORY: No cough, shortness of breath, hemoptysis or wheezing.   CVS: No chest pain, palpitations, orthopnea, dyspnea on exertion or PND.   GI: No melena or hematochezia.   No abdominal pain.  No nausea, vomiting, constipation, diarrhea  : No dysuria or hematuria.   MUSCULOSKELETAL: Chronic arthralgias.  Pain in the left lower leg after recent fracture.  NEUROLOGICAL: No global weakness, loss of consciousness or seizures.       Objective    Vitals:    08/26/21 0924   BP: 127/92   Pulse: (!) 139   Resp: 16   Temp: 97.1 °F (36.2 °C)   TempSrc: Skin   SpO2: 97%   Weight: 94.3 kg (207 lb 14.4 oz)   Height: 170.2 cm (67.01\")   PainSc: 0-No pain     Current Status 6/2/2021   ECOG score 0       PHYSICAL EXAM:    GENERAL:  Well-developed, well-nourished in no acute distress.   SKIN:  Warm, dry without rashes, purpura or petechiae.  EYES:  Pupils equal, round and reactive to light.  EOMs intact.  Conjunctivae normal.  EARS:  Hearing intact.  NOSE:  Septum midline.  No excoriations or nasal discharge.  MOUTH:  Tongue is well-papillated; no stomatitis or ulcers.  Lips normal.  THROAT:  Oropharynx without lesions or exudates.  NECK:  Supple with good range of motion; no thyromegaly or masses, no JVD.  LYMPHATICS:  No cervical, supraclavicular, axillary or inguinal adenopathy.  CHEST:  Lungs clear to auscultation. Good airflow.  CARDIAC:  Regular rate and rhythm with significant tachycardia without murmurs, rubs or gallops. Normal S1,S2.  EXTREMITIES:  No clubbing, cyanosis or edema.  NEUROLOGICAL:  No focal neurological deficits.  PSYCHIATRIC:  Normal affect and mood.   I have reexamined the patient and the results are consistent with the previously documented exam. Jewel Morales MD       RECENT LABS:  Lab " Results   Component Value Date    WBC 8.72 08/26/2021    HGB 15.0 08/26/2021    HCT 44.2 08/26/2021    MCV 98.2 (H) 08/26/2021     08/26/2021       Results from last 7 days   Lab Units 08/26/21  0853   WBC 10*3/mm3 8.72   NEUTROS ABS 10*3/mm3 4.42   HEMOGLOBIN g/dL 15.0   HEMATOCRIT % 44.2   PLATELETS 10*3/mm3 191     Results from last 7 days   Lab Units 08/26/21  0853   SODIUM mmol/L 139   POTASSIUM mmol/L 5.1*   CHLORIDE mmol/L 104   CO2 mmol/L 22.1   BUN mg/dL 18   CREATININE mg/dL 0.99   CALCIUM mg/dL 9.5   ALBUMIN g/dL 4.40   BILIRUBIN mg/dL 0.3   ALK PHOS U/L 76   ALT (SGPT) U/L 50*   AST (SGOT) U/L 47*   GLUCOSE mg/dL 110         Axumin PET  IMPRESSION:  Solitary mildly enlarged lymph node in the posterior aspect  of the pelvis in the presacral region along the course of the inferior  mesenteric vessels showing significant uptake of fluciclovine consistent  with localized metastatic disease. There is no evidence of metastatic  disease elsewhere within the neck, chest, abdomen or pelvis.     This report was finalized on 5/20/2021    Assessment/Plan    ASSESSMENT:  1. Hereditary hemochromatosis:   · Patient historically undergoing phlebotomy 2-3 times a year.  Last phlebotomy was 6/2/2020 for ferritin of 143.    · Continue phlebotomy 2-3 times a year as needed.    2.  Cigar use-we have previously discussed smoking cessation  3.  Atrial fibrillation, on warfarin, managed per cardiology.   4.  Sick sinus syndrome  5.  Prostate cancer [place] 3+3 post radiation 2018 with rising PSA-in 2020  · Prostate biopsy negative in 2 /21   · CT scan with enlarged IMV chain lymph node measuring 11 mm PSA 8.5-4/21  · Axumin PET positive in this lymph node-local recurrence  · ADT initiated by urology  · Radiation given focused to the lymph node which was PET positive  · PSA decreased to 0.27 in 8/21    PLAN:  1. .  Return in 3 months for follow-up check of PSA and NP visit  2.  See me in 6 months  3.  If PSA does not drop  sufficiently low  consider adding Casodex  I did contact Dr. Madhav Manriquez's office to have him evaluated for his A. fib with rapid ventricular response

## 2021-10-21 ENCOUNTER — TELEPHONE (OUTPATIENT)
Dept: RADIATION ONCOLOGY | Facility: HOSPITAL | Age: 71
End: 2021-10-21

## 2021-10-21 NOTE — TELEPHONE ENCOUNTER
Per Dr. Madrigal PET scan and follow up appointment were canceled. PSA went down and there is no need for further imaging at this time. Pt is continuing to follow up with Dr. Mcintyre and urology. Dr. Madrigal will see him back as needed. Pt repeated what I said and v/u. Denies any questions at this time and knows that he can call our office if he needs anything additional.

## 2021-11-02 ENCOUNTER — APPOINTMENT (OUTPATIENT)
Dept: PET IMAGING | Facility: HOSPITAL | Age: 71
End: 2021-11-02

## 2021-11-18 ENCOUNTER — TELEPHONE (OUTPATIENT)
Dept: ONCOLOGY | Facility: CLINIC | Age: 71
End: 2021-11-18

## 2021-11-18 ENCOUNTER — OFFICE VISIT (OUTPATIENT)
Dept: ONCOLOGY | Facility: CLINIC | Age: 71
End: 2021-11-18

## 2021-11-18 ENCOUNTER — LAB (OUTPATIENT)
Dept: LAB | Facility: HOSPITAL | Age: 71
End: 2021-11-18

## 2021-11-18 VITALS
WEIGHT: 209 LBS | OXYGEN SATURATION: 96 % | HEART RATE: 105 BPM | RESPIRATION RATE: 22 BRPM | SYSTOLIC BLOOD PRESSURE: 131 MMHG | HEIGHT: 67 IN | DIASTOLIC BLOOD PRESSURE: 86 MMHG | TEMPERATURE: 97.5 F | BODY MASS INDEX: 32.8 KG/M2

## 2021-11-18 DIAGNOSIS — C61 MALIGNANT NEOPLASM OF PROSTATE (HCC): Primary | ICD-10-CM

## 2021-11-18 DIAGNOSIS — E83.110 HEREDITARY HEMOCHROMATOSIS (HCC): Primary | ICD-10-CM

## 2021-11-18 DIAGNOSIS — C61 MALIGNANT NEOPLASM OF PROSTATE METASTATIC TO INTRAPELVIC LYMPH NODE (HCC): ICD-10-CM

## 2021-11-18 DIAGNOSIS — C77.5 MALIGNANT NEOPLASM OF PROSTATE METASTATIC TO INTRAPELVIC LYMPH NODE (HCC): ICD-10-CM

## 2021-11-18 DIAGNOSIS — C61 MALIGNANT NEOPLASM OF PROSTATE (HCC): ICD-10-CM

## 2021-11-18 LAB
BASOPHILS # BLD AUTO: 0.09 10*3/MM3 (ref 0–0.2)
BASOPHILS NFR BLD AUTO: 1.1 % (ref 0–1.5)
DEPRECATED RDW RBC AUTO: 47.3 FL (ref 37–54)
EOSINOPHIL # BLD AUTO: 0.56 10*3/MM3 (ref 0–0.4)
EOSINOPHIL NFR BLD AUTO: 6.6 % (ref 0.3–6.2)
ERYTHROCYTE [DISTWIDTH] IN BLOOD BY AUTOMATED COUNT: 12.9 % (ref 12.3–15.4)
FERRITIN SERPL-MCNC: 277.9 NG/ML (ref 30–400)
HCT VFR BLD AUTO: 40.4 % (ref 37.5–51)
HGB BLD-MCNC: 13.8 G/DL (ref 13–17.7)
IMM GRANULOCYTES # BLD AUTO: 0.02 10*3/MM3 (ref 0–0.05)
IMM GRANULOCYTES NFR BLD AUTO: 0.2 % (ref 0–0.5)
LYMPHOCYTES # BLD AUTO: 2.49 10*3/MM3 (ref 0.7–3.1)
LYMPHOCYTES NFR BLD AUTO: 29.4 % (ref 19.6–45.3)
MCH RBC QN AUTO: 34.1 PG (ref 26.6–33)
MCHC RBC AUTO-ENTMCNC: 34.2 G/DL (ref 31.5–35.7)
MCV RBC AUTO: 99.8 FL (ref 79–97)
MONOCYTES # BLD AUTO: 0.87 10*3/MM3 (ref 0.1–0.9)
MONOCYTES NFR BLD AUTO: 10.3 % (ref 5–12)
NEUTROPHILS NFR BLD AUTO: 4.43 10*3/MM3 (ref 1.7–7)
NEUTROPHILS NFR BLD AUTO: 52.4 % (ref 42.7–76)
NRBC BLD AUTO-RTO: 0 /100 WBC (ref 0–0.2)
PLATELET # BLD AUTO: 258 10*3/MM3 (ref 140–450)
PMV BLD AUTO: 9.8 FL (ref 6–12)
PSA SERPL-MCNC: 0.06 NG/ML (ref 0–4)
RBC # BLD AUTO: 4.05 10*6/MM3 (ref 4.14–5.8)
WBC NRBC COR # BLD: 8.46 10*3/MM3 (ref 3.4–10.8)

## 2021-11-18 PROCEDURE — 99213 OFFICE O/P EST LOW 20 MIN: CPT | Performed by: NURSE PRACTITIONER

## 2021-11-18 PROCEDURE — 36415 COLL VENOUS BLD VENIPUNCTURE: CPT

## 2021-11-18 PROCEDURE — 85025 COMPLETE CBC W/AUTO DIFF WBC: CPT | Performed by: NURSE PRACTITIONER

## 2021-11-18 PROCEDURE — 84153 ASSAY OF PSA TOTAL: CPT | Performed by: INTERNAL MEDICINE

## 2021-11-18 PROCEDURE — 82728 ASSAY OF FERRITIN: CPT | Performed by: NURSE PRACTITIONER

## 2021-11-18 RX ORDER — METOPROLOL SUCCINATE 100 MG/1
100 TABLET, EXTENDED RELEASE ORAL DAILY
COMMUNITY
Start: 2021-09-10

## 2021-11-18 RX ORDER — ALBUTEROL SULFATE 90 UG/1
2 AEROSOL, METERED RESPIRATORY (INHALATION)
COMMUNITY
Start: 2021-11-11

## 2021-11-18 NOTE — TELEPHONE ENCOUNTER
Left pt voicemail that his ferritin was elevated and he will require a phlebotomy. Scheduling will call him to arrange for an appointment.

## 2021-11-18 NOTE — PROGRESS NOTES
REASONS FOR FOLLOWUP:    Hemochromatosis    HISTORY OF PRESENT ILLNESS:  The patient is a 71 y.o. year old male with hemochromatosis, undergoing phlebotomy for ferritin greater than 100.  Historically he has required phlebotomies 2-3 times a year.  He last underwent phlebotomy in May for ferritin of 177.  As he returns back today ferritin level is pending.  Hemoglobin remained stable/normal at 13.8.  PSA level is also pending and patient [facility].  He is overdue to see them, stating that he was unable to complete appointment 2 weeks ago but this has been rescheduled to take place first week of December.  Patient continues on Trelstar injections through urologist.    Patient's last PET scan performed in May showed continued uptake locally in the pelvis but no other evidence of metastatic disease.  Additional imaging was canceled per Dr. Madrigal as it was not felt the patient needed to repeat PET scan at this time.  His PSA when last checked had improved from 8.4 down to 0.167.    Patient denies other concerns at this time.    Past Medical History:   Diagnosis Date   • Atrial fibrillation (HCC)    • Atrial flutter (HCC)    • Diabetes (HCC)    • Gout    • Heart disease     Status post pacemaker implantation   • Hemochromatosis    • History of syncope    • Hyperlipidemia    • Malignant neoplasm of prostate (HCC) 2018   • RBBB (right bundle branch block)    • Sleep apnea    • SSS (sick sinus syndrome) (HCC)    • SVT (supraventricular tachycardia) (HCC)      Past Surgical History:   Procedure Laterality Date   • CARDIAC ABLATION  2010    Paroxysmal atrial fib/Dr. Koo   • CARDIAC ABLATION  2012    Recurrent atrial flutter/Dr. Koo   • CARDIAC CATHETERIZATION     • CARDIAC PACEMAKER PLACEMENT  2014    Medtronic dual chamber/Dr. Manriquez   • KNEE SURGERY Right    • PACEMAKER IMPLANTATION  05/2006       HEMATOLOGIC/ONCOLOGIC HISTORY:  (History from previous dates can be found in the separate  "document.)    MEDICATIONS    Current Outpatient Medications:   •  albuterol sulfate  (90 Base) MCG/ACT inhaler, Inhale 2 puffs., Disp: , Rfl:   •  allopurinol (ZYLOPRIM) 300 MG tablet, Take 300 mg by mouth Daily., Disp: , Rfl:   •  atorvastatin (LIPITOR) 40 MG tablet, Take 40 mg by mouth Daily., Disp: , Rfl:   •  cyclobenzaprine (FLEXERIL) 10 MG tablet, , Disp: , Rfl:   •  lisinopril (PRINIVIL,ZESTRIL) 10 MG tablet, Take 10 mg by mouth Daily., Disp: , Rfl:   •  metFORMIN (GLUCOPHAGE) 500 MG tablet, Take 500 mg by mouth Daily With Breakfast., Disp: , Rfl:   •  metoprolol succinate XL (TOPROL-XL) 100 MG 24 hr tablet, Take 100 mg by mouth Daily., Disp: , Rfl:   •  Multiple Vitamins-Minerals (MULTIVITAMIN ADULT PO), Take  by mouth., Disp: , Rfl:   •  warfarin (COUMADIN) 5 MG tablet, Take 5 mg by mouth Daily., Disp: , Rfl:   •  metoprolol succinate XL (TOPROL-XL) 25 MG 24 hr tablet, Take 25 mg by mouth Daily., Disp: , Rfl:     ALLERGIES:   No Known Allergies    SOCIAL HISTORY:       Social History     Socioeconomic History   • Marital status:      Spouse name: Suzie   • Years of education: High School   Tobacco Use   • Smoking status: Former Smoker     Types: Cigars   • Smokeless tobacco: Never Used   Vaping Use   • Vaping Use: Never used   Substance and Sexual Activity   • Alcohol use: Yes     Comment: Moderate \"few beers daily\"   • Drug use: No   • Sexual activity: Defer         FAMILY HISTORY:  Family History   Problem Relation Age of Onset   • Cancer Other         LYMPH & BREAST   • Diabetes Other    • Hypertension Other    • Heart disease Other    • Breast cancer Mother    • Lymphoma Father    • Hemochromatosis Father        REVIEW OF SYSTEMS:  GENERAL: No change in appetite or weight; fatigue  Hot flashes on lupron.    SKIN: No nonhealing lesions.   No rashes.  HEME/LYMPH: No swollen nodes.   EYES: No vision changes or diplopia.   ENT: No tinnitus, hearing loss, gum bleeding, epistaxis, hoarseness or " "dysphagia.   RESPIRATORY: No cough, shortness of breath, hemoptysis or wheezing.   CVS: No chest pain, palpitations, orthopnea, dyspnea on exertion or PND.   GI: No melena or hematochezia.   No abdominal pain.  No nausea, vomiting, constipation, diarrhea  : No dysuria or hematuria.   MUSCULOSKELETAL: Chronic arthralgias.  Pain in the left lower leg after recent fracture.  NEUROLOGICAL: No global weakness, loss of consciousness or seizures.     ROS reviewed 11/18/21    Objective    Vitals:    11/18/21 1021   BP: 131/86   Pulse: 105   Resp: 22   Temp: 97.5 °F (36.4 °C)   TempSrc: Temporal   SpO2: 96%   Weight: 94.8 kg (209 lb)   Height: 170 cm (66.93\")   PainSc: 0-No pain     Current Status 11/18/2021   ECOG score 0       PHYSICAL EXAM:    GENERAL:  Well-developed, well-nourished in no acute distress.   SKIN:  Warm, dry without rashes, purpura or petechiae.  EYES:  Pupils equal, round and reactive to light.  EOMs intact.  Conjunctivae normal.  EARS:  Hearing intact.  CHEST:  Lungs clear to auscultation. Good airflow.  CARDIAC:  Regular rate and rhythm with significant tachycardia without murmurs, rubs or gallops. Normal S1,S2.  EXTREMITIES:  No clubbing, cyanosis or edema.  NEUROLOGICAL:  No focal neurological deficits.  PSYCHIATRIC:  Normal affect and mood.     I have reexamined the patient and the results are consistent with the previously documented exam. Vashti Hedrick, ENA       RECENT LABS:  Results from last 7 days   Lab Units 11/18/21  1035   WBC 10*3/mm3 8.46   NEUTROS ABS 10*3/mm3 4.43   HEMOGLOBIN g/dL 13.8   HEMATOCRIT % 40.4   PLATELETS 10*3/mm3 258     Results from last 7 days   Lab Units 11/18/21  1035   FERRITIN ng/mL 277.90                 Axumin PET  IMPRESSION:  Solitary mildly enlarged lymph node in the posterior aspect  of the pelvis in the presacral region along the course of the inferior  mesenteric vessels showing significant uptake of fluciclovine consistent  with localized " metastatic disease. There is no evidence of metastatic  disease elsewhere within the neck, chest, abdomen or pelvis.     This report was finalized on 5/20/2021    Assessment/Plan      ASSESSMENT:  1. Hereditary hemochromatosis:   · Patient historically undergoing phlebotomy 2-3 times a year.    · Most recent ferritin level checked in May 2021, 172 leading to phlebotomy at that time.   · Ferritin has been ordered today, however pending when the patient was here and he did not wish to wait.  It has returned now, elevated at 277.  He will need to return for phlebotomy.  He is already scheduled back in 3 months with Dr. Morales and we will repeat ferritin at that time.  He likely needs to be having phlebotomies more than every 6 months at this point.    2.  Cigar use-we have previously discussed smoking cessation    3.  Atrial fibrillation, on warfarin, managed per cardiology.     4.  Sick sinus syndrome    5.  Prostate cancer [place] 3+3 post radiation 2018 with rising PSA-in 2020  · Prostate biopsy negative in 2 /21   · CT scan with enlarged IMV chain lymph node measuring 11 mm PSA 8.5-4/21  · Axumin PET positive in this lymph node-local recurrence  · ADT initiated by urology  · Radiation given focused to the lymph node which was PET positive  · PSA decreased to 0.167 as of August 2021  · [facility] continues [facility], due to be seen in early December 2021.  He continues on Trelstar injections.     PLAN:  1. Patient did not wish to stay to wait on ferritin results today.  2. Ferritin is now reviewed, elevated at 277.  Patient does need phlebotomy.  We will contact him to arrange this.  3. Patient will return in 3 months for follow-up with Dr. Morales and we will repeat ferritin, CBC and PSA at that time.  Phlebotomize if ferritin greater than 100.  4. PSA level today is pending.  Patient does continue follow-up with [facility] and is receiving Trelstar.

## 2021-11-23 ENCOUNTER — INFUSION (OUTPATIENT)
Dept: ONCOLOGY | Facility: HOSPITAL | Age: 71
End: 2021-11-23

## 2021-11-23 VITALS
RESPIRATION RATE: 18 BRPM | TEMPERATURE: 98.6 F | WEIGHT: 210.6 LBS | HEART RATE: 101 BPM | SYSTOLIC BLOOD PRESSURE: 101 MMHG | BODY MASS INDEX: 33.05 KG/M2 | OXYGEN SATURATION: 97 % | DIASTOLIC BLOOD PRESSURE: 72 MMHG

## 2021-11-23 DIAGNOSIS — E83.119 HEMOCHROMATOSIS, UNSPECIFIED HEMOCHROMATOSIS TYPE: ICD-10-CM

## 2021-11-23 PROCEDURE — 99195 PHLEBOTOMY: CPT

## 2021-11-23 RX ORDER — SODIUM CHLORIDE 9 MG/ML
250 INJECTION, SOLUTION INTRAVENOUS ONCE
Status: CANCELLED | OUTPATIENT
Start: 2021-11-23

## 2021-12-10 ENCOUNTER — CLINICAL SUPPORT (OUTPATIENT)
Dept: ORTHOPEDIC SURGERY | Facility: CLINIC | Age: 71
End: 2021-12-10

## 2021-12-10 DIAGNOSIS — M17.12 ARTHRITIS OF LEFT KNEE: Primary | ICD-10-CM

## 2021-12-10 PROCEDURE — 73562 X-RAY EXAM OF KNEE 3: CPT | Performed by: NURSE PRACTITIONER

## 2021-12-10 PROCEDURE — 20610 DRAIN/INJ JOINT/BURSA W/O US: CPT | Performed by: NURSE PRACTITIONER

## 2021-12-10 RX ORDER — METHYLPREDNISOLONE ACETATE 40 MG/ML
80 INJECTION, SUSPENSION INTRA-ARTICULAR; INTRALESIONAL; INTRAMUSCULAR; SOFT TISSUE
Status: COMPLETED | OUTPATIENT
Start: 2021-12-10 | End: 2021-12-10

## 2021-12-10 RX ADMIN — METHYLPREDNISOLONE ACETATE 80 MG: 40 INJECTION, SUSPENSION INTRA-ARTICULAR; INTRALESIONAL; INTRAMUSCULAR; SOFT TISSUE at 12:27

## 2021-12-10 NOTE — PROGRESS NOTES
12/10/2021    Loyd MURGUIA Robbie is here today for worsening knee pain. Pt has undergone injection of the knee in the past with good resolution of symptoms. Pt is requesting a repeat injection.   Radiology: AP, lateral, 40 degree PA of the left knee obtained in the office today due to pain with prior comparison shows advanced varus arthritis of the left knee    KNEE Injection Procedure Note:    Large Joint Arthrocentesis: L knee  Date/Time: 12/10/2021 12:27 PM  Consent given by: patient  Site marked: site marked  Timeout: Immediately prior to procedure a time out was called to verify the correct patient, procedure, equipment, support staff and site/side marked as required   Supporting Documentation  Indications: pain   Procedure Details  Location: knee - L knee  Preparation: Patient was prepped and draped in the usual sterile fashion  Needle gauge: 21G.  Approach: anterolateral  Medications administered: 4 mL lidocaine (cardiac); 80 mg methylPREDNISolone acetate 40 MG/ML  Patient tolerance: patient tolerated the procedure well with no immediate complications      At the conclusion of the injection I discussed the importance of continued quad strengthening exercises on a daily basis. I will see the patient back if the symptoms should fail to improve or worsen.    Evelyne Elkins, APRN  12/10/2021

## 2022-02-24 ENCOUNTER — INFUSION (OUTPATIENT)
Dept: ONCOLOGY | Facility: HOSPITAL | Age: 72
End: 2022-02-24

## 2022-02-24 ENCOUNTER — LAB (OUTPATIENT)
Dept: LAB | Facility: HOSPITAL | Age: 72
End: 2022-02-24

## 2022-02-24 ENCOUNTER — OFFICE VISIT (OUTPATIENT)
Dept: ONCOLOGY | Facility: CLINIC | Age: 72
End: 2022-02-24

## 2022-02-24 VITALS
HEIGHT: 67 IN | DIASTOLIC BLOOD PRESSURE: 81 MMHG | RESPIRATION RATE: 17 BRPM | OXYGEN SATURATION: 96 % | WEIGHT: 217.1 LBS | BODY MASS INDEX: 34.07 KG/M2 | TEMPERATURE: 97.2 F | HEART RATE: 94 BPM | SYSTOLIC BLOOD PRESSURE: 113 MMHG

## 2022-02-24 DIAGNOSIS — C61 MALIGNANT NEOPLASM OF PROSTATE: Primary | ICD-10-CM

## 2022-02-24 DIAGNOSIS — C77.5 MALIGNANT NEOPLASM OF PROSTATE METASTATIC TO INTRAPELVIC LYMPH NODE: ICD-10-CM

## 2022-02-24 DIAGNOSIS — E83.110 HEREDITARY HEMOCHROMATOSIS: ICD-10-CM

## 2022-02-24 DIAGNOSIS — E83.110 HEREDITARY HEMOCHROMATOSIS: Primary | ICD-10-CM

## 2022-02-24 DIAGNOSIS — C61 MALIGNANT NEOPLASM OF PROSTATE METASTATIC TO INTRAPELVIC LYMPH NODE: ICD-10-CM

## 2022-02-24 LAB
BASOPHILS # BLD AUTO: 0.08 10*3/MM3 (ref 0–0.2)
BASOPHILS NFR BLD AUTO: 0.9 % (ref 0–1.5)
DEPRECATED RDW RBC AUTO: 47.8 FL (ref 37–54)
EOSINOPHIL # BLD AUTO: 0.66 10*3/MM3 (ref 0–0.4)
EOSINOPHIL NFR BLD AUTO: 7.2 % (ref 0.3–6.2)
ERYTHROCYTE [DISTWIDTH] IN BLOOD BY AUTOMATED COUNT: 13.2 % (ref 12.3–15.4)
FERRITIN SERPL-MCNC: 143.7 NG/ML (ref 30–400)
HCT VFR BLD AUTO: 40.6 % (ref 37.5–51)
HGB BLD-MCNC: 13.7 G/DL (ref 13–17.7)
IMM GRANULOCYTES # BLD AUTO: 0.02 10*3/MM3 (ref 0–0.05)
IMM GRANULOCYTES NFR BLD AUTO: 0.2 % (ref 0–0.5)
LYMPHOCYTES # BLD AUTO: 2.82 10*3/MM3 (ref 0.7–3.1)
LYMPHOCYTES NFR BLD AUTO: 30.7 % (ref 19.6–45.3)
MCH RBC QN AUTO: 33.5 PG (ref 26.6–33)
MCHC RBC AUTO-ENTMCNC: 33.7 G/DL (ref 31.5–35.7)
MCV RBC AUTO: 99.3 FL (ref 79–97)
MONOCYTES # BLD AUTO: 0.96 10*3/MM3 (ref 0.1–0.9)
MONOCYTES NFR BLD AUTO: 10.4 % (ref 5–12)
NEUTROPHILS NFR BLD AUTO: 4.65 10*3/MM3 (ref 1.7–7)
NEUTROPHILS NFR BLD AUTO: 50.6 % (ref 42.7–76)
NRBC BLD AUTO-RTO: 0 /100 WBC (ref 0–0.2)
PLATELET # BLD AUTO: 216 10*3/MM3 (ref 140–450)
PMV BLD AUTO: 8.7 FL (ref 6–12)
PSA SERPL-MCNC: 0.04 NG/ML (ref 0–4)
RBC # BLD AUTO: 4.09 10*6/MM3 (ref 4.14–5.8)
WBC NRBC COR # BLD: 9.19 10*3/MM3 (ref 3.4–10.8)

## 2022-02-24 PROCEDURE — 36415 COLL VENOUS BLD VENIPUNCTURE: CPT

## 2022-02-24 PROCEDURE — 84153 ASSAY OF PSA TOTAL: CPT | Performed by: INTERNAL MEDICINE

## 2022-02-24 PROCEDURE — 85025 COMPLETE CBC W/AUTO DIFF WBC: CPT

## 2022-02-24 PROCEDURE — 99195 PHLEBOTOMY: CPT

## 2022-02-24 PROCEDURE — 99214 OFFICE O/P EST MOD 30 MIN: CPT | Performed by: INTERNAL MEDICINE

## 2022-02-24 PROCEDURE — 82728 ASSAY OF FERRITIN: CPT

## 2022-02-24 RX ORDER — SODIUM CHLORIDE 9 MG/ML
250 INJECTION, SOLUTION INTRAVENOUS ONCE
Status: CANCELLED | OUTPATIENT
Start: 2022-03-03

## 2022-02-24 NOTE — PROGRESS NOTES
REASONS FOR FOLLOWUP:    Hemochromatosis and metastatic prostate cancer    HISTORY OF PRESENT ILLNESS:  The patient is a 71 y.o. year old male with hemochromatosis, undergoing phlebotomy for ferritin greater than 100.  Historically he has required phlebotomies 2-3 times a year.  He last underwent phlebotomy in May for ferritin of 177.  As he returns back today ferritin level is 147     hemoglobin remained stable/normal at 13.8.      After radiation to his isolated recurrence in the retroperitoneum his PSA level is 0.043 and patient [facility].  Remains on Trelstar but tells me he is testosterone levels were detectable and not sure how this happened    Patient denies other concerns at this time.    Past Medical History:   Diagnosis Date   • Atrial fibrillation (HCC)    • Atrial flutter (HCC)    • Diabetes (HCC)    • Gout    • Heart disease     Status post pacemaker implantation   • Hemochromatosis    • History of syncope    • Hyperlipidemia    • Malignant neoplasm of prostate (HCC) 2018   • RBBB (right bundle branch block)    • Sleep apnea    • SSS (sick sinus syndrome) (HCC)    • SVT (supraventricular tachycardia) (HCC)      Past Surgical History:   Procedure Laterality Date   • CARDIAC ABLATION  2010    Paroxysmal atrial fib/Dr. Koo   • CARDIAC ABLATION  2012    Recurrent atrial flutter/Dr. Koo   • CARDIAC CATHETERIZATION     • CARDIAC PACEMAKER PLACEMENT  2014    Medtronic dual chamber/Dr. Manriquez   • KNEE SURGERY Right    • PACEMAKER IMPLANTATION  05/2006       HEMATOLOGIC/ONCOLOGIC HISTORY:  (History from previous dates can be found in the separate document.)    11/19    The patient is a 69 y.o. year old male formally seen by Dr. Aquino in this office, with hemochromatosis on intermittent phlebotomy the patient has been undergoing phlebotomy roughly every 3 months during 2019.  His last two ferritin measurements were in the 90s.  Ferritin level today is 80.  He cannot stay today and  wants to do 3 phlebotomies per year which is very reasonable     He is still smoking cigars and I try to talk him into stopping this  He drinks 6-8 beers a week and sometimes more  Is not had a colonoscopy recently and has not followed up with a cardiologist in a while And remains on Xarelto for his atrial fibrillation     5/21    As he is reviewed back today he tells me his PSA has been going up recently after his prostate cancer radiation 3 years ago and Dr. abdullahi biopsied his prostate and this was negative and did a CAT scan of the abdomen which showed a mildly enlarged lymph node in the periaortic area which is new PSA is up to 6.46     He told me  wanted to discuss the situation with him and I told him we would do an Axumin PET scan repeat his PSA and discussed with Dr. Abdullahi about Lupron with or without abiraterone    6/21  He is here to review the results of his Axumin PET scan which shows 1 heron metastasis in the IMV area  oligometastatic and he got his first dose of his ADT yesterday  And is scheduled to see Dr. Abdullahi in 6 weeks     11/21  Patient's last PET scan performed in May 2021 showed continued uptake locally in the pelvis but no other evidence of metastatic disease.  Additional imaging was canceled per Dr. Madrigal as it was not felt the patient needed to repeat PET scan at this time.  His PSA when last checked had improved from 8.4 down to 0.167.      MEDICATIONS    Current Outpatient Medications:   •  albuterol sulfate  (90 Base) MCG/ACT inhaler, Inhale 2 puffs., Disp: , Rfl:   •  allopurinol (ZYLOPRIM) 300 MG tablet, Take 300 mg by mouth Daily., Disp: , Rfl:   •  atorvastatin (LIPITOR) 40 MG tablet, Take 40 mg by mouth Daily., Disp: , Rfl:   •  cyclobenzaprine (FLEXERIL) 10 MG tablet, , Disp: , Rfl:   •  lisinopril (PRINIVIL,ZESTRIL) 10 MG tablet, Take 10 mg by mouth Daily., Disp: , Rfl:   •  metFORMIN (GLUCOPHAGE) 500 MG tablet, Take 500 mg by mouth Daily With Breakfast., Disp: , Rfl:   •   "metoprolol succinate XL (TOPROL-XL) 100 MG 24 hr tablet, Take 100 mg by mouth Daily., Disp: , Rfl:   •  Multiple Vitamins-Minerals (MULTIVITAMIN ADULT PO), Take  by mouth., Disp: , Rfl:   •  warfarin (COUMADIN) 5 MG tablet, Take 5 mg by mouth Daily., Disp: , Rfl:     ALLERGIES:   No Known Allergies    SOCIAL HISTORY:       Social History     Socioeconomic History   • Marital status:      Spouse name: Suzie   • Years of education: High School   Tobacco Use   • Smoking status: Former Smoker     Types: Cigars   • Smokeless tobacco: Never Used   Vaping Use   • Vaping Use: Never used   Substance and Sexual Activity   • Alcohol use: Yes     Comment: Moderate \"few beers daily\"   • Drug use: No   • Sexual activity: Defer         FAMILY HISTORY:  Family History   Problem Relation Age of Onset   • Cancer Other         LYMPH & BREAST   • Diabetes Other    • Hypertension Other    • Heart disease Other    • Breast cancer Mother    • Lymphoma Father    • Hemochromatosis Father        REVIEW OF SYSTEMS:  GENERAL: No change in appetite or weight; fatigue  Hot flashes on lupron.    SKIN: No nonhealing lesions.   No rashes.  HEME/LYMPH: No swollen nodes.   EYES: No vision changes or diplopia.   ENT: No tinnitus, hearing loss, gum bleeding, epistaxis, hoarseness or dysphagia.   RESPIRATORY: No cough, shortness of breath, hemoptysis or wheezing.   CVS: No chest pain, palpitations, orthopnea, dyspnea on exertion or PND.   GI: No melena or hematochezia.   No abdominal pain.  No nausea, vomiting, constipation, diarrhea  : No dysuria or hematuria.   MUSCULOSKELETAL: Chronic arthralgias.  Pain in the left lower leg after recent fracture.  NEUROLOGICAL: No global weakness, loss of consciousness or seizures.     ROS reviewed 02/24/22    Objective    Vitals:    02/24/22 0914   BP: 113/81   Pulse: 94   Resp: 17   Temp: 97.2 °F (36.2 °C)   TempSrc: Temporal   SpO2: 96%   Weight: 98.5 kg (217 lb 1.6 oz)   Height: 170 cm (66.93\") "   PainSc: 0-No pain     Current Status 2/24/2022   ECOG score 0       PHYSICAL EXAM:    GENERAL:  Well-developed, well-nourished in no acute distress.   SKIN:  Warm, dry without rashes, purpura or petechiae.  EYES:  Pupils equal, round and reactive to light.  EOMs intact.  Conjunctivae normal.  EARS:  Hearing intact.  CHEST:  Lungs clear to auscultation. Good airflow.  CARDIAC:  Regular rate and rhythm with significant tachycardia without murmurs, rubs or gallops. Normal S1,S2.  EXTREMITIES:  No clubbing, cyanosis or edema.  NEUROLOGICAL:  No focal neurological deficits.  PSYCHIATRIC:  Normal affect and mood.     I have reexamined the patient and the results are consistent with the previously documented exam. Jewel Morales MD       RECENT LABS:  Results from last 7 days   Lab Units 02/24/22  0859   WBC 10*3/mm3 9.19   NEUTROS ABS 10*3/mm3 4.65   HEMOGLOBIN g/dL 13.7   HEMATOCRIT % 40.6   PLATELETS 10*3/mm3 216     Results from last 7 days   Lab Units 02/24/22  0859   FERRITIN ng/mL 143.70                 Axumin PET  IMPRESSION:  Solitary mildly enlarged lymph node in the posterior aspect  of the pelvis in the presacral region along the course of the inferior  mesenteric vessels showing significant uptake of fluciclovine consistent  with localized metastatic disease. There is no evidence of metastatic  disease elsewhere within the neck, chest, abdomen or pelvis.     This report was finalized on 5/20/2021    Assessment/Plan      ASSESSMENT:  1. Hereditary hemochromatosis:   · Patient historically undergoing phlebotomy 2-3 times a year.    · Most recent ferritin level checked in May 2021, 172 leading to phlebotomy at that time.   · Ferritin has been ordered today, however pending when the patient was here and he did not wish to wait.  It has returned now, elevated at 277.  He will need to return for phlebotomy.  He is already scheduled back in 3 months with Dr. Morales and we will repeat ferritin at that time.   He likely needs to be having phlebotomies more than every 6 months at this point.  · Phlebotomy every 4 months continued in 2/22    2.  Cigar use-we have previously discussed smoking cessation    3.  Atrial fibrillation, on warfarin, managed per cardiology.     4.  Sick sinus syndrome    5.  Prostate cancer [place] 3+3 post radiation 2018 with rising PSA-in 2020  · Prostate biopsy negative in 2 /21   · CT scan with enlarged IMV chain lymph node measuring 11 mm PSA 8.5-4/21  · Axumin PET positive in this lymph node-local recurrence  · ADT initiated by urology  · Radiation given focused to the lymph node which was PET positive  · PSA decreased to 0.167 as of August 2021  · Patient continues [facility], due to be seen in early December 2021.  He continues on Trelstar injections.   · PSA 0.043 in 2/22    PLAN:  1. Phlebotomy today and in 4 months  2. Patient will return in 8 months for follow-up with Dr. Morales and we will repeat ferritin, CBC and PSA at that time.  Phlebotomize if ferritin greater than 100.  3.   Patient does continue follow-up with [facility] and is receiving Trelstar.

## 2022-04-20 ENCOUNTER — CLINICAL SUPPORT (OUTPATIENT)
Dept: ORTHOPEDIC SURGERY | Facility: CLINIC | Age: 72
End: 2022-04-20

## 2022-04-20 VITALS — WEIGHT: 210 LBS | TEMPERATURE: 97.3 F | HEIGHT: 67 IN | BODY MASS INDEX: 32.96 KG/M2

## 2022-04-20 DIAGNOSIS — M17.12 PRIMARY OSTEOARTHRITIS OF LEFT KNEE: Primary | ICD-10-CM

## 2022-04-20 PROCEDURE — 20610 DRAIN/INJ JOINT/BURSA W/O US: CPT | Performed by: NURSE PRACTITIONER

## 2022-04-20 RX ADMIN — METHYLPREDNISOLONE ACETATE 80 MG: 80 INJECTION, SUSPENSION INTRA-ARTICULAR; INTRALESIONAL; INTRAMUSCULAR; SOFT TISSUE at 10:07

## 2022-04-20 RX ADMIN — METHYLPREDNISOLONE ACETATE 80 MG: 80 INJECTION, SUSPENSION INTRA-ARTICULAR; INTRALESIONAL; INTRAMUSCULAR; SOFT TISSUE at 10:39

## 2022-04-20 NOTE — PROGRESS NOTES
Patient Name: Loyd Avalos   YOB: 1950  Referring Primary Care Physician: Ari Lopez Jr., MD  BMI: Body mass index is 32.89 kg/m².    Chief Complaint:    Chief Complaint   Patient presents with   • Left Knee - Pain, Follow-up   • Right Knee - Pain, Follow-up        HPI: Here for left knee pain desires conservative treatment injections work well for him    Loyd Avalos is a 71 y.o. male who presents today for evaluation of   Chief Complaint   Patient presents with   • Left Knee - Pain, Follow-up   • Right Knee - Pain, Follow-up   .      Subjective   Medications:   Home Medications:  Current Outpatient Medications on File Prior to Visit   Medication Sig   • allopurinol (ZYLOPRIM) 300 MG tablet Take 300 mg by mouth Daily.   • atorvastatin (LIPITOR) 40 MG tablet Take 40 mg by mouth Daily.   • lisinopril (PRINIVIL,ZESTRIL) 10 MG tablet Take 10 mg by mouth Daily.   • metFORMIN (GLUCOPHAGE) 500 MG tablet Take 500 mg by mouth Daily With Breakfast.   • metoprolol succinate XL (TOPROL-XL) 100 MG 24 hr tablet Take 100 mg by mouth Daily.   • Multiple Vitamins-Minerals (MULTIVITAMIN ADULT PO) Take  by mouth.   • warfarin (COUMADIN) 5 MG tablet Take 5 mg by mouth Daily.   • albuterol sulfate  (90 Base) MCG/ACT inhaler Inhale 2 puffs.   • cyclobenzaprine (FLEXERIL) 10 MG tablet      No current facility-administered medications on file prior to visit.     Current Medications:  Scheduled Meds:  Continuous Infusions:No current facility-administered medications for this visit.    PRN Meds:.    I have reviewed the patient's medical history in detail and updated the computerized patient record.  Review and summarization of old records includes:    Past Medical History:   Diagnosis Date   • Atrial fibrillation (HCC)    • Atrial flutter (HCC)    • Diabetes (HCC)    • Gout    • Heart disease     Status post pacemaker implantation   • Hemochromatosis    • History of syncope    • Hyperlipidemia    •  "Malignant neoplasm of prostate (HCC) 2018   • RBBB (right bundle branch block)    • Sleep apnea    • SSS (sick sinus syndrome) (HCC)    • SVT (supraventricular tachycardia) (HCC)         Past Surgical History:   Procedure Laterality Date   • CARDIAC ABLATION  2010    Paroxysmal atrial fib/Dr. Koo   • CARDIAC ABLATION  2012    Recurrent atrial flutter/Dr. Koo   • CARDIAC CATHETERIZATION     • CARDIAC PACEMAKER PLACEMENT  2014    Medtronic dual chamber/Dr. Manriquez   • KNEE SURGERY Right    • PACEMAKER IMPLANTATION  05/2006        Social History     Occupational History     Employer: RETIRED   Tobacco Use   • Smoking status: Current Some Day Smoker     Types: Cigars   • Smokeless tobacco: Never Used   Vaping Use   • Vaping Use: Never used   Substance and Sexual Activity   • Alcohol use: Yes     Comment: Moderate \"few beers daily\"   • Drug use: No   • Sexual activity: Defer      Social History     Social History Narrative   • Not on file        Family History   Problem Relation Age of Onset   • Cancer Other         LYMPH & BREAST   • Diabetes Other    • Hypertension Other    • Heart disease Other    • Breast cancer Mother    • Lymphoma Father    • Hemochromatosis Father        ROS: 14 point review of systems was performed and all other systems were reviewed and are negative except for documented findings in HPI and today's encounter.     Allergies: No Known Allergies  Constitutional:  Denies fever, shaking or chills   Eyes:  Denies change in visual acuity   HENT:  Denies nasal congestion or sore throat   Respiratory:  Denies cough or shortness of breath   Cardiovascular:  Denies chest pain or severe LE edema   GI:  Denies abdominal pain, nausea, vomiting, bloody stools or diarrhea   Musculoskeletal:  Numbness, tingling, pain, or loss of motor function only as noted above in history of present illness.  : Denies painful urination or hematuria  Integument:  Denies rash, lesion or ulceration   Neurologic:  Denies " "headache or focal weakness  Endocrine:  Denies lymphadenopathy  Psych:  Denies confusion or change in mental status   Hem:  Denies active bleeding    OBJECTIVE:  Physical Exam: 71 y.o. male  Wt Readings from Last 3 Encounters:   04/20/22 95.3 kg (210 lb)   02/24/22 98.5 kg (217 lb 1.6 oz)   11/23/21 95.5 kg (210 lb 9.6 oz)     Ht Readings from Last 1 Encounters:   04/20/22 170.2 cm (67\")     Body mass index is 32.89 kg/m².  Vitals:    04/20/22 1037   Temp: 97.3 °F (36.3 °C)     Vital signs reviewed.     General Appearance:    Alert, cooperative, in no acute distress                  Eyes: conjunctiva clear  ENT: external ears and nose atraumatic  CV: no peripheral edema  Resp: normal respiratory effort  Skin: no rashes or wounds; normal turgor  Psych: mood and affect appropriate  Lymph: no nodes appreciated  Neuro: gross sensation intact  Vascular:  Palpable peripheral pulse in noted extremity  Musculoskeletal Extremities: Left knee medial joint line tenderness with effusion calf is soft and nontender    Radiology:   Reviewed from 12/10/2021 tricompartmental osteoarthritis        Assessment: No diagnosis found.     MDM/Plan:   The diagnosis(es), natural history, pathophysiology and treatment for diagnosis(es) were discussed. Opportunity given and questions answered.  Biomechanics of pertinent body areas discussed.  When appropriate, the use of ambulatory aids discussed.    The diagnosis(es), natural history, pathophysiology and treatment for diagnosis(es) were discussed. Opportunity given and questions answered.  Biomechanics of pertinent body areas discussed.  When appropriate, the use of ambulatory aids discussed.  BMI:  The concept of BMI body mass index and its importance and implications discussed.    EXERCISES:  Advice on benefits of, and types of regular/moderate exercise pertaining to orthopedic diagnosis(es).  MEDICATIONS:  The risks, benefits, warnings,side effects and alternatives of medications " discussed.  Inflammation/pain control; with cold, heat, elevation and/or liniments discussed as appropriate  Cortisone Injection. See procedure note.  CONSULT: This Consult is done at the request of a requesting provider to whom I will send this report with this rendered opinion.     Large Joint Arthrocentesis: L knee  Date/Time: 4/20/2022 10:07 AM  Consent given by: patient  Site marked: site marked  Timeout: Immediately prior to procedure a time out was called to verify the correct patient, procedure, equipment, support staff and site/side marked as required   Supporting Documentation  Indications: pain   Procedure Details  Location: knee - L knee  Preparation: Patient was prepped and draped in the usual sterile fashion  Needle gauge: 21G.  Approach: anteromedial  Medications administered: 80 mg methylPREDNISolone acetate 80 MG/ML; 4 mL lidocaine (cardiac)  Patient tolerance: patient tolerated the procedure well with no immediate complications    Large Joint Arthrocentesis: R knee  Date/Time: 4/20/2022 10:39 AM  Consent given by: patient  Site marked: site marked  Timeout: Immediately prior to procedure a time out was called to verify the correct patient, procedure, equipment, support staff and site/side marked as required   Supporting Documentation  Indications: pain   Procedure Details  Location: knee - R knee  Preparation: Patient was prepped and draped in the usual sterile fashion  Needle gauge: 21G.  Approach: anteromedial  Medications administered: 80 mg methylPREDNISolone acetate 80 MG/ML; 4 mL lidocaine (cardiac)  Patient tolerance: patient tolerated the procedure well with no immediate complications              4/21/2022    Dictated utilizing Dragon dictation

## 2022-04-21 RX ORDER — METHYLPREDNISOLONE ACETATE 80 MG/ML
80 INJECTION, SUSPENSION INTRA-ARTICULAR; INTRALESIONAL; INTRAMUSCULAR; SOFT TISSUE
Status: COMPLETED | OUTPATIENT
Start: 2022-04-20 | End: 2022-04-20

## 2022-06-16 ENCOUNTER — LAB (OUTPATIENT)
Dept: LAB | Facility: HOSPITAL | Age: 72
End: 2022-06-16

## 2022-06-16 ENCOUNTER — INFUSION (OUTPATIENT)
Dept: ONCOLOGY | Facility: HOSPITAL | Age: 72
End: 2022-06-16

## 2022-06-16 VITALS
SYSTOLIC BLOOD PRESSURE: 114 MMHG | BODY MASS INDEX: 34.93 KG/M2 | RESPIRATION RATE: 18 BRPM | OXYGEN SATURATION: 97 % | WEIGHT: 223 LBS | DIASTOLIC BLOOD PRESSURE: 77 MMHG | HEART RATE: 95 BPM | TEMPERATURE: 97.3 F

## 2022-06-16 DIAGNOSIS — E83.110 HEREDITARY HEMOCHROMATOSIS: ICD-10-CM

## 2022-06-16 DIAGNOSIS — E83.110 HEREDITARY HEMOCHROMATOSIS: Primary | ICD-10-CM

## 2022-06-16 DIAGNOSIS — C61 MALIGNANT NEOPLASM OF PROSTATE: ICD-10-CM

## 2022-06-16 LAB
BASOPHILS # BLD AUTO: 0.07 10*3/MM3 (ref 0–0.2)
BASOPHILS NFR BLD AUTO: 0.7 % (ref 0–1.5)
DEPRECATED RDW RBC AUTO: 48.2 FL (ref 37–54)
EOSINOPHIL # BLD AUTO: 0.44 10*3/MM3 (ref 0–0.4)
EOSINOPHIL NFR BLD AUTO: 4.5 % (ref 0.3–6.2)
ERYTHROCYTE [DISTWIDTH] IN BLOOD BY AUTOMATED COUNT: 13.2 % (ref 12.3–15.4)
FERRITIN SERPL-MCNC: 144 NG/ML (ref 30–400)
HCT VFR BLD AUTO: 38.7 % (ref 37.5–51)
HGB BLD-MCNC: 12.9 G/DL (ref 13–17.7)
IMM GRANULOCYTES # BLD AUTO: 0.03 10*3/MM3 (ref 0–0.05)
IMM GRANULOCYTES NFR BLD AUTO: 0.3 % (ref 0–0.5)
LYMPHOCYTES # BLD AUTO: 2.45 10*3/MM3 (ref 0.7–3.1)
LYMPHOCYTES NFR BLD AUTO: 25 % (ref 19.6–45.3)
MCH RBC QN AUTO: 33.3 PG (ref 26.6–33)
MCHC RBC AUTO-ENTMCNC: 33.3 G/DL (ref 31.5–35.7)
MCV RBC AUTO: 100 FL (ref 79–97)
MONOCYTES # BLD AUTO: 0.87 10*3/MM3 (ref 0.1–0.9)
MONOCYTES NFR BLD AUTO: 8.9 % (ref 5–12)
NEUTROPHILS NFR BLD AUTO: 5.94 10*3/MM3 (ref 1.7–7)
NEUTROPHILS NFR BLD AUTO: 60.6 % (ref 42.7–76)
NRBC BLD AUTO-RTO: 0 /100 WBC (ref 0–0.2)
PLATELET # BLD AUTO: 215 10*3/MM3 (ref 140–450)
PMV BLD AUTO: 9.4 FL (ref 6–12)
RBC # BLD AUTO: 3.87 10*6/MM3 (ref 4.14–5.8)
WBC NRBC COR # BLD: 9.8 10*3/MM3 (ref 3.4–10.8)

## 2022-06-16 PROCEDURE — 36415 COLL VENOUS BLD VENIPUNCTURE: CPT

## 2022-06-16 PROCEDURE — 82728 ASSAY OF FERRITIN: CPT

## 2022-06-16 PROCEDURE — 99195 PHLEBOTOMY: CPT

## 2022-06-16 PROCEDURE — 85025 COMPLETE CBC W/AUTO DIFF WBC: CPT

## 2022-06-16 RX ORDER — SODIUM CHLORIDE 9 MG/ML
250 INJECTION, SOLUTION INTRAVENOUS ONCE
Status: CANCELLED | OUTPATIENT
Start: 2022-06-23

## 2022-06-16 NOTE — NURSING NOTE
Pt here for poss phlebo today. He did not want to wait for ferritin results. Last ferritin was 147 and he has not been under 100 in the last 2 years. He agreed that he was likely not going to be under 100 today and wanted to proceed with phlebo. Pt tolerated well.

## 2022-07-26 ENCOUNTER — CLINICAL SUPPORT (OUTPATIENT)
Dept: ORTHOPEDIC SURGERY | Facility: CLINIC | Age: 72
End: 2022-07-26

## 2022-07-26 VITALS — TEMPERATURE: 98 F | HEIGHT: 67 IN | WEIGHT: 219 LBS | BODY MASS INDEX: 34.37 KG/M2

## 2022-07-26 DIAGNOSIS — R52 PAIN: Primary | ICD-10-CM

## 2022-07-26 DIAGNOSIS — M17.0 ARTHRITIS OF BOTH KNEES: ICD-10-CM

## 2022-07-26 PROCEDURE — 73562 X-RAY EXAM OF KNEE 3: CPT | Performed by: NURSE PRACTITIONER

## 2022-07-26 PROCEDURE — 20610 DRAIN/INJ JOINT/BURSA W/O US: CPT | Performed by: NURSE PRACTITIONER

## 2022-07-26 RX ORDER — LIDOCAINE HYDROCHLORIDE 20 MG/ML
20 INJECTION, SOLUTION INFILTRATION; PERINEURAL
Status: COMPLETED | OUTPATIENT
Start: 2022-07-26 | End: 2022-07-26

## 2022-07-26 RX ORDER — METHYLPREDNISOLONE ACETATE 80 MG/ML
80 INJECTION, SUSPENSION INTRA-ARTICULAR; INTRALESIONAL; INTRAMUSCULAR; SOFT TISSUE
Status: COMPLETED | OUTPATIENT
Start: 2022-07-26 | End: 2022-07-26

## 2022-07-26 RX ADMIN — LIDOCAINE HYDROCHLORIDE 20 MG: 20 INJECTION, SOLUTION INFILTRATION; PERINEURAL at 15:42

## 2022-07-26 RX ADMIN — METHYLPREDNISOLONE ACETATE 80 MG: 80 INJECTION, SUSPENSION INTRA-ARTICULAR; INTRALESIONAL; INTRAMUSCULAR; SOFT TISSUE at 15:42

## 2022-07-26 RX ADMIN — LIDOCAINE HYDROCHLORIDE 20 MG: 20 INJECTION, SOLUTION INFILTRATION; PERINEURAL at 15:41

## 2022-07-26 RX ADMIN — METHYLPREDNISOLONE ACETATE 80 MG: 80 INJECTION, SUSPENSION INTRA-ARTICULAR; INTRALESIONAL; INTRAMUSCULAR; SOFT TISSUE at 15:41

## 2022-07-26 NOTE — PROGRESS NOTES
Loyd Avalos is here today for worsening Bilateral knee pain. Knee injection was discussed with the patient in detail, including indication, risks, benefits, and alternatives. Verbal consent was given for the procedure. Injection site was aseptically prepared with Betadine and alcohol swabs.     Radiology:   AP, lateral, 40 degree PA of the bilateral knee obtained in the office today due to pain, with comparison views shows advanced tricompartmental degenerative changes, most significantly  medial and patellofemoral compartment, with osteophyte formation and subchondral sclerosis      KNEE Injection Procedure Note:    Large Joint Arthrocentesis: R knee  Date/Time: 7/26/2022 3:41 PM  Consent given by: patient  Site marked: site marked  Timeout: Immediately prior to procedure a time out was called to verify the correct patient, procedure, equipment, support staff and site/side marked as required   Supporting Documentation  Indications: pain, joint swelling and diagnostic evaluation   Procedure Details  Location: knee - R knee  Preparation: Patient was prepped and draped in the usual sterile fashion  Needle gauge: 21G.  Medications administered: 80 mg methylPREDNISolone acetate 80 MG/ML; 20 mg lidocaine 2%  Patient tolerance: patient tolerated the procedure well with no immediate complications    Large Joint Arthrocentesis: L knee  Date/Time: 7/26/2022 3:42 PM  Consent given by: patient  Site marked: site marked  Timeout: Immediately prior to procedure a time out was called to verify the correct patient, procedure, equipment, support staff and site/side marked as required   Supporting Documentation  Indications: pain   Procedure Details  Location: knee - L knee  Preparation: Patient was prepped and draped in the usual sterile fashion  Needle gauge: 21G.  Medications administered: 80 mg methylPREDNISolone acetate 80 MG/ML; 20 mg lidocaine 2%  Patient tolerance: patient tolerated the procedure well with no  immediate complications        Loyd Avalos tolerated the procedure well. A Bandage was applied to the injection site. At the conclusion of the injection I discussed the importance of continued quad strengthening exercises on a daily basis. I will see the patient back if the symptoms should fail to improve or worsen.    Minal Terrell, APRN  7/26/2022      Much of this encounter note is an electronic transcription/translation of spoken language to printed text. The electronic translation of spoken language may permit erroneous, or at times, nonsensical words or phrases to be inadvertently transcribed; Although I have reviewed the note for such errors, some may still exist

## 2022-10-06 ENCOUNTER — INFUSION (OUTPATIENT)
Dept: ONCOLOGY | Facility: HOSPITAL | Age: 72
End: 2022-10-06

## 2022-10-06 ENCOUNTER — OFFICE VISIT (OUTPATIENT)
Dept: ONCOLOGY | Facility: CLINIC | Age: 72
End: 2022-10-06

## 2022-10-06 ENCOUNTER — LAB (OUTPATIENT)
Dept: LAB | Facility: HOSPITAL | Age: 72
End: 2022-10-06

## 2022-10-06 VITALS
BODY MASS INDEX: 33.63 KG/M2 | HEART RATE: 89 BPM | SYSTOLIC BLOOD PRESSURE: 102 MMHG | OXYGEN SATURATION: 96 % | HEIGHT: 67 IN | RESPIRATION RATE: 16 BRPM | WEIGHT: 214.3 LBS | DIASTOLIC BLOOD PRESSURE: 71 MMHG | TEMPERATURE: 97.8 F

## 2022-10-06 DIAGNOSIS — C61 MALIGNANT NEOPLASM OF PROSTATE: ICD-10-CM

## 2022-10-06 DIAGNOSIS — E83.110 HEREDITARY HEMOCHROMATOSIS: Primary | ICD-10-CM

## 2022-10-06 DIAGNOSIS — E83.110 HEREDITARY HEMOCHROMATOSIS: ICD-10-CM

## 2022-10-06 LAB
BASOPHILS # BLD AUTO: 0.1 10*3/MM3 (ref 0–0.2)
BASOPHILS NFR BLD AUTO: 0.9 % (ref 0–1.5)
DEPRECATED RDW RBC AUTO: 49.1 FL (ref 37–54)
EOSINOPHIL # BLD AUTO: 0.55 10*3/MM3 (ref 0–0.4)
EOSINOPHIL NFR BLD AUTO: 5.1 % (ref 0.3–6.2)
ERYTHROCYTE [DISTWIDTH] IN BLOOD BY AUTOMATED COUNT: 13.3 % (ref 12.3–15.4)
FERRITIN SERPL-MCNC: 157.2 NG/ML (ref 30–400)
HCT VFR BLD AUTO: 42.2 % (ref 37.5–51)
HGB BLD-MCNC: 14 G/DL (ref 13–17.7)
IMM GRANULOCYTES # BLD AUTO: 0.05 10*3/MM3 (ref 0–0.05)
IMM GRANULOCYTES NFR BLD AUTO: 0.5 % (ref 0–0.5)
LYMPHOCYTES # BLD AUTO: 3.2 10*3/MM3 (ref 0.7–3.1)
LYMPHOCYTES NFR BLD AUTO: 29.7 % (ref 19.6–45.3)
MCH RBC QN AUTO: 33.3 PG (ref 26.6–33)
MCHC RBC AUTO-ENTMCNC: 33.2 G/DL (ref 31.5–35.7)
MCV RBC AUTO: 100.2 FL (ref 79–97)
MONOCYTES # BLD AUTO: 0.84 10*3/MM3 (ref 0.1–0.9)
MONOCYTES NFR BLD AUTO: 7.8 % (ref 5–12)
NEUTROPHILS NFR BLD AUTO: 56 % (ref 42.7–76)
NEUTROPHILS NFR BLD AUTO: 6.05 10*3/MM3 (ref 1.7–7)
NRBC BLD AUTO-RTO: 0 /100 WBC (ref 0–0.2)
PLATELET # BLD AUTO: 255 10*3/MM3 (ref 140–450)
PMV BLD AUTO: 9.1 FL (ref 6–12)
PSA SERPL-MCNC: 0.03 NG/ML (ref 0–4)
RBC # BLD AUTO: 4.21 10*6/MM3 (ref 4.14–5.8)
WBC NRBC COR # BLD: 10.79 10*3/MM3 (ref 3.4–10.8)

## 2022-10-06 PROCEDURE — 99195 PHLEBOTOMY: CPT

## 2022-10-06 PROCEDURE — 36415 COLL VENOUS BLD VENIPUNCTURE: CPT

## 2022-10-06 PROCEDURE — 96360 HYDRATION IV INFUSION INIT: CPT

## 2022-10-06 PROCEDURE — 84153 ASSAY OF PSA TOTAL: CPT | Performed by: INTERNAL MEDICINE

## 2022-10-06 PROCEDURE — 99214 OFFICE O/P EST MOD 30 MIN: CPT | Performed by: NURSE PRACTITIONER

## 2022-10-06 PROCEDURE — 85025 COMPLETE CBC W/AUTO DIFF WBC: CPT

## 2022-10-06 PROCEDURE — 82728 ASSAY OF FERRITIN: CPT

## 2022-10-06 RX ORDER — SODIUM CHLORIDE 9 MG/ML
1000 INJECTION, SOLUTION INTRAVENOUS CONTINUOUS
Status: DISCONTINUED | OUTPATIENT
Start: 2022-10-06 | End: 2022-10-06 | Stop reason: HOSPADM

## 2022-10-06 RX ORDER — LISINOPRIL 20 MG/1
TABLET ORAL
COMMUNITY
Start: 2022-08-21

## 2022-10-06 RX ORDER — SODIUM CHLORIDE 9 MG/ML
250 INJECTION, SOLUTION INTRAVENOUS ONCE
OUTPATIENT
Start: 2022-10-13

## 2022-10-06 RX ADMIN — SODIUM CHLORIDE 1000 ML: 9 INJECTION, SOLUTION INTRAVENOUS at 14:23

## 2022-10-06 NOTE — PROGRESS NOTES
REASONS FOR FOLLOWUP:    Hemochromatosis and metastatic prostate cancer    HISTORY OF PRESENT ILLNESS:  The patient is a 72 y.o. year old male with hemochromatosis, undergoing phlebotomy for ferritin greater than 100.  He has been receiving phlebotomy every 3 to 4 months since November 2021 with his most 2 recent ferritins in the 140 range.  He denies feeling any differently whether his ferritin is closer to 100 or higher.  He denies any recent changes to his health.    Hemoglobin remains normal at 14 today.    After radiation he has a recurrence in the retroperitoneum his PSA level is 0.043 and patient continues [facility].  Patient remains on Trelstar.  He denies any recent changes.  He reports his last PSA was good through [facility] but is unsure the number.     Past Medical History:   Diagnosis Date   • Atrial fibrillation (HCC)    • Atrial flutter (HCC)    • Diabetes (HCC)    • Gout    • Heart disease     Status post pacemaker implantation   • Hemochromatosis    • History of syncope    • Hyperlipidemia    • Malignant neoplasm of prostate (HCC) 2018   • RBBB (right bundle branch block)    • Sleep apnea    • SSS (sick sinus syndrome) (HCC)    • SVT (supraventricular tachycardia) (HCC)      Past Surgical History:   Procedure Laterality Date   • CARDIAC ABLATION  2010    Paroxysmal atrial fib/Dr. Koo   • CARDIAC ABLATION  2012    Recurrent atrial flutter/Dr. Koo   • CARDIAC CATHETERIZATION     • CARDIAC PACEMAKER PLACEMENT  2014    Medtronic dual chamber/Dr. Manriquez   • KNEE SURGERY Right    • PACEMAKER IMPLANTATION  05/2006       HEMATOLOGIC/ONCOLOGIC HISTORY:  (History from previous dates can be found in the separate document.)    11/19    The patient is a 69 y.o. year old male formally seen by Dr. Aquino in this office, with hemochromatosis on intermittent phlebotomy the patient has been undergoing phlebotomy roughly every 3 months during 2019.  His last two ferritin measurements were in  the 90s.  Ferritin level today is 80.  He cannot stay today and wants to do 3 phlebotomies per year which is very reasonable     He is still smoking cigars and I try to talk him into stopping this  He drinks 6-8 beers a week and sometimes more  Is not had a colonoscopy recently and has not followed up with a cardiologist in a while And remains on Xarelto for his atrial fibrillation     5/21    As he is reviewed back today he tells me his PSA has been going up recently after his prostate cancer radiation 3 years ago and Dr. abdullahi biopsied his prostate and this was negative and did a CAT scan of the abdomen which showed a mildly enlarged lymph node in the periaortic area which is new PSA is up to 6.46     He told me  wanted to discuss the situation with him and I told him we would do an Axumin PET scan repeat his PSA and discussed with Dr. Abdullahi about Lupron with or without abiraterone    6/21  He is here to review the results of his Axumin PET scan which shows 1 heron metastasis in the IMV area  oligometastatic and he got his first dose of his ADT yesterday  And is scheduled to see Dr. Abdullahi in 6 weeks     11/21  Patient's last PET scan performed in May 2021 showed continued uptake locally in the pelvis but no other evidence of metastatic disease.  Additional imaging was canceled per Dr. Madrigal as it was not felt the patient needed to repeat PET scan at this time.  His PSA when last checked had improved from 8.4 down to 0.167.      MEDICATIONS    Current Outpatient Medications:   •  albuterol sulfate  (90 Base) MCG/ACT inhaler, Inhale 2 puffs., Disp: , Rfl:   •  allopurinol (ZYLOPRIM) 300 MG tablet, Take 300 mg by mouth Daily., Disp: , Rfl:   •  atorvastatin (LIPITOR) 40 MG tablet, Take 40 mg by mouth Daily., Disp: , Rfl:   •  cyclobenzaprine (FLEXERIL) 10 MG tablet, , Disp: , Rfl:   •  lisinopril (PRINIVIL,ZESTRIL) 20 MG tablet, , Disp: , Rfl:   •  metFORMIN (GLUCOPHAGE) 500 MG tablet, Take 500 mg by mouth  "Daily With Breakfast., Disp: , Rfl:   •  metoprolol succinate XL (TOPROL-XL) 100 MG 24 hr tablet, Take 100 mg by mouth Daily., Disp: , Rfl:   •  Multiple Vitamins-Minerals (MULTIVITAMIN ADULT PO), Take  by mouth., Disp: , Rfl:   •  warfarin (COUMADIN) 5 MG tablet, Take 5 mg by mouth Daily., Disp: , Rfl:   •  lisinopril (PRINIVIL,ZESTRIL) 10 MG tablet, Take 10 mg by mouth Daily., Disp: , Rfl:     ALLERGIES:   No Known Allergies    SOCIAL HISTORY:       Social History     Socioeconomic History   • Marital status:      Spouse name: Suzie   • Years of education: High School   Tobacco Use   • Smoking status: Current Some Day Smoker     Types: Cigars   • Smokeless tobacco: Never Used   Vaping Use   • Vaping Use: Never used   Substance and Sexual Activity   • Alcohol use: Yes     Comment: Moderate \"few beers daily\"   • Drug use: No   • Sexual activity: Defer         FAMILY HISTORY:  Family History   Problem Relation Age of Onset   • Cancer Other         LYMPH & BREAST   • Diabetes Other    • Hypertension Other    • Heart disease Other    • Breast cancer Mother    • Lymphoma Father    • Hemochromatosis Father        REVIEW OF SYSTEMS:  GENERAL: No change in appetite or weight; fatigue  Hot flashes on lupron.    SKIN: No nonhealing lesions.   No rashes.  HEME/LYMPH: No swollen nodes.   EYES: No vision changes or diplopia.   ENT: No tinnitus, hearing loss, gum bleeding, epistaxis, hoarseness or dysphagia.   RESPIRATORY: No cough, shortness of breath, hemoptysis or wheezing.   CVS: No chest pain, palpitations, orthopnea, dyspnea on exertion or PND.   GI: No melena or hematochezia.   No abdominal pain.  No nausea, vomiting, constipation, diarrhea  : No dysuria or hematuria.   MUSCULOSKELETAL: Chronic arthralgias.    NEUROLOGICAL: No global weakness, loss of consciousness or seizures.     ROS reviewed 10/06/22    Objective    Vitals:    10/06/22 1323   BP: 102/71   Pulse: 89   Resp: 16   Temp: 97.8 °F (36.6 °C) " "  TempSrc: Temporal   SpO2: 96%   Weight: 97.2 kg (214 lb 4.8 oz)   Height: 170.2 cm (67.01\")   PainSc: 0-No pain     Current Status 10/6/2022   ECOG score 0       PHYSICAL EXAM:    GENERAL:  Well-developed, well-nourished in no acute distress.   SKIN:  Warm, dry without rashes, purpura or petechiae.  EYES:  Pupils equal, round and reactive to light.  EOMs intact.  Conjunctivae normal.  EARS:  Hearing intact.  CHEST:  Lungs clear to auscultation. Good airflow.  CARDIAC:  Regular rate and rhythm.  EXTREMITIES:  No clubbing, cyanosis or edema.  NEUROLOGICAL:  No focal neurological deficits.  PSYCHIATRIC: Agitated affect and mood.     I have reexamined the patient and the results are consistent with the previously documented exam. ENA Shields       RECENT LABS:  Results from last 7 days   Lab Units 10/06/22  1313   WBC 10*3/mm3 10.79   NEUTROS ABS 10*3/mm3 6.05   HEMOGLOBIN g/dL 14.0   HEMATOCRIT % 42.2   PLATELETS 10*3/mm3 255                     Axumin PET  IMPRESSION:  Solitary mildly enlarged lymph node in the posterior aspect  of the pelvis in the presacral region along the course of the inferior  mesenteric vessels showing significant uptake of fluciclovine consistent  with localized metastatic disease. There is no evidence of metastatic  disease elsewhere within the neck, chest, abdomen or pelvis.     This report was finalized on 5/20/2021    Assessment & Plan      ASSESSMENT:  1. Hereditary hemochromatosis:   · Patient historically undergoing phlebotomy 2-3 times a year.    · Most recent ferritin level checked in May 2021, 172 leading to phlebotomy at that time.   · Ferritin has been ordered today, however pending when the patient was here and he did not wish to wait.  It has returned now, elevated at 277.  He will need to return for phlebotomy.  He is already scheduled back in 3 months with Dr. Morales and we will repeat ferritin at that time.  He likely needs to be having phlebotomies more than every 6 " months at this point.  · Patient returns 10/6/2022 due for possible phlebotomy with ferritin pending.  Patient does not wish to wait for the ferritin in the past once again is asking to proceed with phlebotomy today.  Ferritin is currently pending and being ran stat.  Hemoglobin today is 14.  Most recently his hemoglobin has been less than 14 with ferritin in the 140 range.  We will proceed with phlebotomy today.    2.  Cigar use-we have previously discussed smoking cessation    3.  Atrial fibrillation, on warfarin, managed per cardiology.     4.  Sick sinus syndrome    5.  Prostate cancer [place] 3+3 post radiation 2018 with rising PSA-in 2020  · Prostate biopsy negative in 2 /21   · CT scan with enlarged IMV chain lymph node measuring 11 mm PSA 8.5-4/21  · Axumin PET positive in this lymph node-local recurrence  · ADT initiated by urology  · Radiation given focused to the lymph node which was PET positive  · PSA decreased to 0.167 as of August 2021  · [facility], due to be seen in early December 2021.  He continues on Trelstar injections.   · PSA 0.043 in 2/22  · 10/6/2022 patient reports no change in treatment plan per urology currently.  PSA pending today.    PLAN:  1. Proceed with phlebotomy today  2. Return in 4 months for CBC, ferritin, phlebotomy  3. Return in 8 months for CBC, ferritin, PSA, and follow-up with Dr. Morales.  4. Phlebotomize if ferritin greater than 100.  5. Patient does continue follow-up with [facility] and is receiving Trelstar.    ADDENDUM: patient BP low today he has increased his BP medication over the last 6 months and he denies lightheadedness.  He did not eat today however and because further hypotensive in infusion.  He was givin 1L NS and BP improved to upper 90s systolic and he was released.  I suggest next phlebotomy h east prior and half his lisinopril.  He verbalized understanding.

## 2022-10-06 NOTE — NURSING NOTE
"Post phlebo BP 80/56. Pt states it's normal for his BP to go down following phlebotomy. Unable to obtain manual BP reading. Recheck w/ another BP machine read 60s/40s and pt now reports \"breaking out in a sweat\". NS bolus started and pt reclined. BP recheck 75/50.    Katy NP aware of patient condition.  cc bolus complete, BP 72/55, HR 83. Pt no longer diaphoretic. Second 250 cc NS started. Per Katy, pt to receive total of 1L NS today w/ close monitoring of BP.     Pt reports he's only eaten some crackers here in the office today, he was concerned he may need to fast w/ labs. Educated pt that he will never need to fast on days he is scheduled for phlebotomy. Pt is also on both lisinopril and metoprolol, both of which he takes in the morning. He reports lisinopril was increased from 10 mg to 20 mg daily about 6 months ago.    Katy NP at chairside, BP now 88/62.     Following completion of 500 cc NS, BP 90/64, HR 73.    Upon completion of NS 1L, pt walked to front of the infusion area and BP rechecked. BP 97/66, okay for D/C per Katy NP. Per Katy, pt should only take half of his lisinopril on days he comes for phlebotomy. Pt called, no answer. LVM informing pt of this.      "

## 2022-10-28 ENCOUNTER — CLINICAL SUPPORT (OUTPATIENT)
Dept: ORTHOPEDIC SURGERY | Facility: CLINIC | Age: 72
End: 2022-10-28

## 2022-10-28 VITALS — TEMPERATURE: 96.9 F | BODY MASS INDEX: 33.97 KG/M2 | WEIGHT: 216.4 LBS | HEIGHT: 67 IN

## 2022-10-28 DIAGNOSIS — M17.0 ARTHRITIS OF BOTH KNEES: Primary | ICD-10-CM

## 2022-10-28 PROCEDURE — 20610 DRAIN/INJ JOINT/BURSA W/O US: CPT | Performed by: NURSE PRACTITIONER

## 2022-10-28 RX ORDER — METHYLPREDNISOLONE ACETATE 80 MG/ML
INJECTION, SUSPENSION INTRA-ARTICULAR; INTRALESIONAL; INTRAMUSCULAR; SOFT TISSUE
Status: COMPLETED | OUTPATIENT
Start: 2022-10-28 | End: 2022-10-28

## 2022-10-28 RX ORDER — LIDOCAINE HYDROCHLORIDE 10 MG/ML
2 INJECTION, SOLUTION EPIDURAL; INFILTRATION; INTRACAUDAL; PERINEURAL
Status: COMPLETED | OUTPATIENT
Start: 2022-10-28 | End: 2022-10-28

## 2022-10-28 RX ADMIN — LIDOCAINE HYDROCHLORIDE 2 ML: 10 INJECTION, SOLUTION EPIDURAL; INFILTRATION; INTRACAUDAL; PERINEURAL at 11:17

## 2022-10-28 RX ADMIN — METHYLPREDNISOLONE ACETATE: 80 INJECTION, SUSPENSION INTRA-ARTICULAR; INTRALESIONAL; INTRAMUSCULAR; SOFT TISSUE at 11:17

## 2022-10-28 NOTE — PROGRESS NOTES
Loyd Avalos is here today for worsening Bilateral knee pain. Knee injection was discussed with the patient in detail, including indication, risks, benefits, and alternatives. Verbal consent was given for the procedure. Injection site was aseptically prepared.        KNEE Injection Procedure Note:    Large Joint Arthrocentesis: R knee  Date/Time: 10/28/2022 11:17 AM  Consent given by: patient  Site marked: site marked  Timeout: Immediately prior to procedure a time out was called to verify the correct patient, procedure, equipment, support staff and site/side marked as required   Supporting Documentation  Indications: pain   Procedure Details  Location: knee - R knee  Preparation: Patient was prepped and draped in the usual sterile fashion  Needle gauge: 21g.  Approach: anterolateral  Medications administered: methylPREDNISolone acetate 80 MG/ML; 2 mL lidocaine PF 1% 1 %  Patient tolerance: patient tolerated the procedure well with no immediate complications    Large Joint Arthrocentesis: L knee  Date/Time: 10/28/2022 11:17 AM  Consent given by: patient  Site marked: site marked  Timeout: Immediately prior to procedure a time out was called to verify the correct patient, procedure, equipment, support staff and site/side marked as required   Supporting Documentation  Indications: pain   Procedure Details  Location: knee - L knee  Preparation: Patient was prepped and draped in the usual sterile fashion  Needle gauge: 21g.  Approach: anterolateral  Medications administered: methylPREDNISolone acetate 80 MG/ML; 2 mL lidocaine PF 1% 1 %  Patient tolerance: patient tolerated the procedure well with no immediate complications        Loyd Avalos tolerated the procedure well. A Bandage was applied to the injection site. At the conclusion of the injection I discussed the importance of continued quad strengthening exercises on a daily basis. I will see the patient back if the symptoms should fail to improve  or worsen.    Minal Terrell, APRN  10/28/2022    Dictated Utilizing Dragon Dictation

## 2023-02-03 ENCOUNTER — LAB (OUTPATIENT)
Dept: LAB | Facility: HOSPITAL | Age: 73
End: 2023-02-03
Payer: MEDICARE

## 2023-02-03 ENCOUNTER — APPOINTMENT (OUTPATIENT)
Dept: ONCOLOGY | Facility: HOSPITAL | Age: 73
End: 2023-02-03
Payer: MEDICARE

## 2023-02-03 DIAGNOSIS — E83.119 HEMOCHROMATOSIS, UNSPECIFIED HEMOCHROMATOSIS TYPE: ICD-10-CM

## 2023-02-03 LAB
BASOPHILS # BLD AUTO: 0.08 10*3/MM3 (ref 0–0.2)
BASOPHILS NFR BLD AUTO: 0.7 % (ref 0–1.5)
DEPRECATED RDW RBC AUTO: 51.1 FL (ref 37–54)
EOSINOPHIL # BLD AUTO: 0.51 10*3/MM3 (ref 0–0.4)
EOSINOPHIL NFR BLD AUTO: 4.6 % (ref 0.3–6.2)
ERYTHROCYTE [DISTWIDTH] IN BLOOD BY AUTOMATED COUNT: 14.1 % (ref 12.3–15.4)
FERRITIN SERPL-MCNC: 86.3 NG/ML (ref 30–400)
HCT VFR BLD AUTO: 37.5 % (ref 37.5–51)
HGB BLD-MCNC: 12.6 G/DL (ref 13–17.7)
IMM GRANULOCYTES # BLD AUTO: 0.02 10*3/MM3 (ref 0–0.05)
IMM GRANULOCYTES NFR BLD AUTO: 0.2 % (ref 0–0.5)
LYMPHOCYTES # BLD AUTO: 2.93 10*3/MM3 (ref 0.7–3.1)
LYMPHOCYTES NFR BLD AUTO: 26.3 % (ref 19.6–45.3)
MCH RBC QN AUTO: 33.3 PG (ref 26.6–33)
MCHC RBC AUTO-ENTMCNC: 33.6 G/DL (ref 31.5–35.7)
MCV RBC AUTO: 99.2 FL (ref 79–97)
MONOCYTES # BLD AUTO: 0.95 10*3/MM3 (ref 0.1–0.9)
MONOCYTES NFR BLD AUTO: 8.5 % (ref 5–12)
NEUTROPHILS NFR BLD AUTO: 59.7 % (ref 42.7–76)
NEUTROPHILS NFR BLD AUTO: 6.66 10*3/MM3 (ref 1.7–7)
NRBC BLD AUTO-RTO: 0 /100 WBC (ref 0–0.2)
PLATELET # BLD AUTO: 220 10*3/MM3 (ref 140–450)
PMV BLD AUTO: 8.9 FL (ref 6–12)
RBC # BLD AUTO: 3.78 10*6/MM3 (ref 4.14–5.8)
WBC NRBC COR # BLD: 11.15 10*3/MM3 (ref 3.4–10.8)

## 2023-02-03 PROCEDURE — 36415 COLL VENOUS BLD VENIPUNCTURE: CPT

## 2023-02-03 PROCEDURE — 85025 COMPLETE CBC W/AUTO DIFF WBC: CPT

## 2023-02-03 PROCEDURE — 82728 ASSAY OF FERRITIN: CPT

## 2023-02-10 ENCOUNTER — CLINICAL SUPPORT (OUTPATIENT)
Dept: ORTHOPEDIC SURGERY | Facility: CLINIC | Age: 73
End: 2023-02-10
Payer: MEDICARE

## 2023-02-10 VITALS — BODY MASS INDEX: 33.9 KG/M2 | HEIGHT: 67 IN | WEIGHT: 216 LBS | TEMPERATURE: 96.7 F

## 2023-02-10 DIAGNOSIS — R52 PAIN: Primary | ICD-10-CM

## 2023-02-10 DIAGNOSIS — M17.0 ARTHRITIS OF BOTH KNEES: ICD-10-CM

## 2023-02-10 PROCEDURE — 73562 X-RAY EXAM OF KNEE 3: CPT | Performed by: NURSE PRACTITIONER

## 2023-02-10 PROCEDURE — 20610 DRAIN/INJ JOINT/BURSA W/O US: CPT | Performed by: NURSE PRACTITIONER

## 2023-02-10 RX ORDER — METHYLPREDNISOLONE ACETATE 80 MG/ML
80 INJECTION, SUSPENSION INTRA-ARTICULAR; INTRALESIONAL; INTRAMUSCULAR; SOFT TISSUE
Status: COMPLETED | OUTPATIENT
Start: 2023-02-10 | End: 2023-02-10

## 2023-02-10 RX ORDER — LIDOCAINE HYDROCHLORIDE 10 MG/ML
1 INJECTION, SOLUTION EPIDURAL; INFILTRATION; INTRACAUDAL; PERINEURAL
Status: COMPLETED | OUTPATIENT
Start: 2023-02-10 | End: 2023-02-10

## 2023-02-10 RX ADMIN — METHYLPREDNISOLONE ACETATE 80 MG: 80 INJECTION, SUSPENSION INTRA-ARTICULAR; INTRALESIONAL; INTRAMUSCULAR; SOFT TISSUE at 08:42

## 2023-02-10 RX ADMIN — LIDOCAINE HYDROCHLORIDE 1 ML: 10 INJECTION, SOLUTION EPIDURAL; INFILTRATION; INTRACAUDAL; PERINEURAL at 08:42

## 2023-02-10 NOTE — PROGRESS NOTES
Loyd Avalos is here today for worsening Bilateral knee pain. Knee injection was discussed with the patient in detail, including indication, risks, benefits, and alternatives. Verbal consent was given for the procedure. Injection site was aseptically prepared.    Radiology:   AP, lateral, 40 degree PA of the bilateral knee obtained in the office today due to pain, with comparison views shows advanced tricompartmental degenerative changes, most significantly  medial and patellofemoral compartment with osteophyte formation and subchondral sclerosis      KNEE Injection Procedure Note:    Large Joint Arthrocentesis: L knee  Date/Time: 2/10/2023 8:42 AM  Consent given by: patient  Site marked: site marked  Timeout: Immediately prior to procedure a time out was called to verify the correct patient, procedure, equipment, support staff and site/side marked as required   Supporting Documentation  Indications: pain   Procedure Details  Location: knee - L knee  Preparation: Patient was prepped and draped in the usual sterile fashion  Needle gauge: 21G.  Medications administered: 80 mg methylPREDNISolone acetate 80 MG/ML; 1 mL lidocaine PF 1% 1 %  Patient tolerance: patient tolerated the procedure well with no immediate complications    Large Joint Arthrocentesis: R knee  Date/Time: 2/10/2023 8:42 AM  Consent given by: patient  Site marked: site marked  Timeout: Immediately prior to procedure a time out was called to verify the correct patient, procedure, equipment, support staff and site/side marked as required   Supporting Documentation  Indications: pain, joint swelling and diagnostic evaluation   Procedure Details  Location: knee - R knee  Preparation: Patient was prepped and draped in the usual sterile fashion  Needle gauge: 21G.  Medications administered: 80 mg methylPREDNISolone acetate 80 MG/ML; 1 mL lidocaine PF 1% 1 %  Patient tolerance: patient tolerated the procedure well with no immediate  complications      Loyd Avalos tolerated the procedure well. A Bandage was applied to the injection site. At the conclusion of the injection I discussed the importance of continued quad strengthening exercises on a daily basis. I will see the patient back if the symptoms should fail to improve or worsen.    Minal Terrell, APRN  2/10/2023    Dictated Utilizing Dragon Dictation

## 2023-05-02 RX ORDER — SODIUM CHLORIDE 9 MG/ML
250 INJECTION, SOLUTION INTRAVENOUS ONCE
Status: CANCELLED | OUTPATIENT
Start: 2023-05-02

## 2023-05-04 ENCOUNTER — OFFICE VISIT (OUTPATIENT)
Dept: ONCOLOGY | Facility: CLINIC | Age: 73
End: 2023-05-04
Payer: MEDICARE

## 2023-05-04 ENCOUNTER — LAB (OUTPATIENT)
Dept: LAB | Facility: HOSPITAL | Age: 73
End: 2023-05-04
Payer: MEDICARE

## 2023-05-04 ENCOUNTER — INFUSION (OUTPATIENT)
Dept: ONCOLOGY | Facility: HOSPITAL | Age: 73
End: 2023-05-04
Payer: MEDICARE

## 2023-05-04 VITALS
TEMPERATURE: 97.1 F | RESPIRATION RATE: 18 BRPM | DIASTOLIC BLOOD PRESSURE: 72 MMHG | OXYGEN SATURATION: 97 % | SYSTOLIC BLOOD PRESSURE: 104 MMHG | HEART RATE: 89 BPM | BODY MASS INDEX: 33.32 KG/M2 | HEIGHT: 67 IN | WEIGHT: 212.3 LBS

## 2023-05-04 DIAGNOSIS — C61 MALIGNANT NEOPLASM OF PROSTATE: ICD-10-CM

## 2023-05-04 DIAGNOSIS — C77.5 MALIGNANT NEOPLASM OF PROSTATE METASTATIC TO INTRAPELVIC LYMPH NODE: ICD-10-CM

## 2023-05-04 DIAGNOSIS — C61 MALIGNANT NEOPLASM OF PROSTATE METASTATIC TO INTRAPELVIC LYMPH NODE: ICD-10-CM

## 2023-05-04 DIAGNOSIS — E83.110 HEREDITARY HEMOCHROMATOSIS: Primary | ICD-10-CM

## 2023-05-04 DIAGNOSIS — E83.110 HEREDITARY HEMOCHROMATOSIS: ICD-10-CM

## 2023-05-04 LAB
BASOPHILS # BLD AUTO: 0.08 10*3/MM3 (ref 0–0.2)
BASOPHILS NFR BLD AUTO: 0.8 % (ref 0–1.5)
DEPRECATED RDW RBC AUTO: 53.5 FL (ref 37–54)
EOSINOPHIL # BLD AUTO: 0.61 10*3/MM3 (ref 0–0.4)
EOSINOPHIL NFR BLD AUTO: 6.4 % (ref 0.3–6.2)
ERYTHROCYTE [DISTWIDTH] IN BLOOD BY AUTOMATED COUNT: 14.6 % (ref 12.3–15.4)
FERRITIN SERPL-MCNC: 102.4 NG/ML (ref 30–400)
HCT VFR BLD AUTO: 38.5 % (ref 37.5–51)
HGB BLD-MCNC: 12.7 G/DL (ref 13–17.7)
IMM GRANULOCYTES # BLD AUTO: 0.02 10*3/MM3 (ref 0–0.05)
IMM GRANULOCYTES NFR BLD AUTO: 0.2 % (ref 0–0.5)
LYMPHOCYTES # BLD AUTO: 2.57 10*3/MM3 (ref 0.7–3.1)
LYMPHOCYTES NFR BLD AUTO: 27.1 % (ref 19.6–45.3)
MCH RBC QN AUTO: 33.4 PG (ref 26.6–33)
MCHC RBC AUTO-ENTMCNC: 33 G/DL (ref 31.5–35.7)
MCV RBC AUTO: 101.3 FL (ref 79–97)
MONOCYTES # BLD AUTO: 0.88 10*3/MM3 (ref 0.1–0.9)
MONOCYTES NFR BLD AUTO: 9.3 % (ref 5–12)
NEUTROPHILS NFR BLD AUTO: 5.33 10*3/MM3 (ref 1.7–7)
NEUTROPHILS NFR BLD AUTO: 56.2 % (ref 42.7–76)
NRBC BLD AUTO-RTO: 0 /100 WBC (ref 0–0.2)
PLATELET # BLD AUTO: 229 10*3/MM3 (ref 140–450)
PMV BLD AUTO: 9.1 FL (ref 6–12)
PSA SERPL-MCNC: <0.014 NG/ML (ref 0–4)
RBC # BLD AUTO: 3.8 10*6/MM3 (ref 4.14–5.8)
WBC NRBC COR # BLD: 9.49 10*3/MM3 (ref 3.4–10.8)

## 2023-05-04 PROCEDURE — 85025 COMPLETE CBC W/AUTO DIFF WBC: CPT

## 2023-05-04 PROCEDURE — 82728 ASSAY OF FERRITIN: CPT

## 2023-05-04 PROCEDURE — 99195 PHLEBOTOMY: CPT

## 2023-05-04 PROCEDURE — 84153 ASSAY OF PSA TOTAL: CPT | Performed by: INTERNAL MEDICINE

## 2023-05-04 PROCEDURE — 36415 COLL VENOUS BLD VENIPUNCTURE: CPT

## 2023-05-04 RX ORDER — SODIUM CHLORIDE 9 MG/ML
250 INJECTION, SOLUTION INTRAVENOUS ONCE
OUTPATIENT
Start: 2023-05-11

## 2023-05-04 NOTE — PROGRESS NOTES
REASONS FOR FOLLOWUP:    Hemochromatosis and metastatic prostate cancer    HISTORY OF PRESENT ILLNESS:  The patient is a 72 y.o. year old male with hemochromatosis, undergoing phlebotomy for ferritin greater than 100.  He has been receiving phlebotomy every 3 to 4 months since November 2021 with his most  recent ferritins in the 86 range.  He denies feeling any differently whether his ferritin is closer to 100 or higher.  He denies any recent changes to his health.    Hemoglobin remains normal at 12.7 today.  He has not had a phlebotomy since October    After radiation he has a recurrence in the retroperitoneum his PSA level is 0.043 and patient continues [facility].  Patient remains on Trelstar every 6 months.  He denies any recent changes.  He reports his last PSA was good through [facility] but is unsure the number.     Past Medical History:   Diagnosis Date   • Atrial fibrillation    • Atrial flutter    • Diabetes    • Gout    • Heart disease     Status post pacemaker implantation   • Hemochromatosis    • History of syncope    • Hyperlipidemia    • Malignant neoplasm of prostate 2018   • RBBB (right bundle branch block)    • Sleep apnea    • SSS (sick sinus syndrome)    • SVT (supraventricular tachycardia)      Past Surgical History:   Procedure Laterality Date   • CARDIAC ABLATION  2010    Paroxysmal atrial fib/Dr. Koo   • CARDIAC ABLATION  2012    Recurrent atrial flutter/Dr. Koo   • CARDIAC CATHETERIZATION     • CARDIAC PACEMAKER PLACEMENT  2014    Medtronic dual chamber/Dr. Manriquez   • KNEE SURGERY Right    • PACEMAKER IMPLANTATION  05/2006       HEMATOLOGIC/ONCOLOGIC HISTORY:  (History from previous dates can be found in the separate document.)    11/19    The patient is a 69 y.o. year old male formally seen by Dr. Aquino in this office, with hemochromatosis on intermittent phlebotomy the patient has been undergoing phlebotomy roughly every 3 months during 2019.  His last two ferritin  measurements were in the 90s.  Ferritin level today is 80.  He cannot stay today and wants to do 3 phlebotomies per year which is very reasonable     He is still smoking cigars and I try to talk him into stopping this  He drinks 6-8 beers a week and sometimes more  Is not had a colonoscopy recently and has not followed up with a cardiologist in a while And remains on Xarelto for his atrial fibrillation     5/21    As he is reviewed back today he tells me his PSA has been going up recently after his prostate cancer radiation 3 years ago and Dr. abdullahi biopsied his prostate and this was negative and did a CAT scan of the abdomen which showed a mildly enlarged lymph node in the periaortic area which is new PSA is up to 6.46     He told me  wanted to discuss the situation with him and I told him we would do an Axumin PET scan repeat his PSA and discussed with Dr. Abdullahi about Lupron with or without abiraterone    6/21  He is here to review the results of his Axumin PET scan which shows 1 heron metastasis in the IMV area  oligometastatic and he got his first dose of his ADT yesterday  And is scheduled to see Dr. Abdullahi in 6 weeks     11/21  Patient's last PET scan performed in May 2021 showed continued uptake locally in the pelvis but no other evidence of metastatic disease.  Additional imaging was canceled per Dr. Madrigal as it was not felt the patient needed to repeat PET scan at this time.  His PSA when last checked had improved from 8.4 down to 0.167.      MEDICATIONS    Current Outpatient Medications:   •  albuterol sulfate  (90 Base) MCG/ACT inhaler, Inhale 2 puffs., Disp: , Rfl:   •  allopurinol (ZYLOPRIM) 300 MG tablet, Take 1 tablet by mouth Daily., Disp: , Rfl:   •  atorvastatin (LIPITOR) 40 MG tablet, Take 1 tablet by mouth Daily., Disp: , Rfl:   •  cyclobenzaprine (FLEXERIL) 10 MG tablet, , Disp: , Rfl:   •  lisinopril (PRINIVIL,ZESTRIL) 10 MG tablet, Take 1 tablet by mouth Daily., Disp: , Rfl:   •   "lisinopril (PRINIVIL,ZESTRIL) 20 MG tablet, , Disp: , Rfl:   •  metFORMIN (GLUCOPHAGE) 500 MG tablet, Take 1 tablet by mouth Daily With Breakfast., Disp: , Rfl:   •  metoprolol succinate XL (TOPROL-XL) 100 MG 24 hr tablet, Take 1 tablet by mouth Daily., Disp: , Rfl:   •  Multiple Vitamins-Minerals (MULTIVITAMIN ADULT PO), Take  by mouth., Disp: , Rfl:   •  warfarin (COUMADIN) 5 MG tablet, Take 1 tablet by mouth Daily., Disp: , Rfl:     ALLERGIES:   No Known Allergies    SOCIAL HISTORY:       Social History     Socioeconomic History   • Marital status:      Spouse name: Suzie   • Years of education: High School   Tobacco Use   • Smoking status: Some Days     Types: Cigars   • Smokeless tobacco: Never   Vaping Use   • Vaping Use: Never used   Substance and Sexual Activity   • Alcohol use: Yes     Comment: Moderate \"few beers daily\"   • Drug use: No   • Sexual activity: Defer         FAMILY HISTORY:  Family History   Problem Relation Age of Onset   • Cancer Other         LYMPH & BREAST   • Diabetes Other    • Hypertension Other    • Heart disease Other    • Breast cancer Mother    • Lymphoma Father    • Hemochromatosis Father        REVIEW OF SYSTEMS:  GENERAL: No change in appetite or weight; fatigue  Hot flashes on lupron.    SKIN: No nonhealing lesions.   No rashes.  HEME/LYMPH: No swollen nodes.   EYES: No vision changes or diplopia.   ENT: No tinnitus, hearing loss, gum bleeding, epistaxis, hoarseness or dysphagia.   RESPIRATORY: No cough, shortness of breath, hemoptysis or wheezing.   CVS: No chest pain, palpitations, orthopnea, dyspnea on exertion or PND.   GI: No melena or hematochezia.   No abdominal pain.  No nausea, vomiting, constipation, diarrhea  : No dysuria or hematuria.   MUSCULOSKELETAL: Chronic arthralgias.    NEUROLOGICAL: No global weakness, loss of consciousness or seizures.   ROS reviewed 05/04/23    Objective    Vitals:    05/04/23 1150   BP: 104/72   Pulse: 89   Resp: 18   Temp: 97.1 " "°F (36.2 °C)   TempSrc: Temporal   SpO2: 97%   Weight: 96.3 kg (212 lb 4.8 oz)   Height: 170.2 cm (67.01\")   PainSc: 0-No pain         5/4/2023    11:39 AM   Current Status   ECOG score 0       PHYSICAL EXAM:    GENERAL:  Well-developed, well-nourished in no acute distress.   SKIN:  Warm, dry without rashes, purpura or petechiae.  EYES:  Pupils equal, round and reactive to light.  EOMs intact.  Conjunctivae normal.  EARS:  Hearing intact.  CHEST:  Lungs clear to auscultation. Good airflow.  CARDIAC:  Regular rate and rhythm.  EXTREMITIES:  No clubbing, cyanosis or edema.  NEUROLOGICAL:  No focal neurological deficits.  PSYCHIATRIC: Agitated affect and mood.     I have reexamined the patient and the results are consistent with the previously documented exam. Jewel Morales MD       RECENT LABS:  Results from last 7 days   Lab Units 05/04/23  1135   WBC 10*3/mm3 9.49   NEUTROS ABS 10*3/mm3 5.33   HEMOGLOBIN g/dL 12.7*   HEMATOCRIT % 38.5   PLATELETS 10*3/mm3 229     Results from last 7 days   Lab Units 05/04/23  1135   FERRITIN ng/mL 102.40                 Axumin PET  IMPRESSION:  Solitary mildly enlarged lymph node in the posterior aspect  of the pelvis in the presacral region along the course of the inferior  mesenteric vessels showing significant uptake of fluciclovine consistent  with localized metastatic disease. There is no evidence of metastatic  disease elsewhere within the neck, chest, abdomen or pelvis.     This report was finalized on 5/20/2021    Assessment & Plan      ASSESSMENT:  1. Hereditary hemochromatosis:   · Patient historically undergoing phlebotomy 2-3 times a year.    · Most recent ferritin level checked in May 2021, 172 leading to phlebotomy at that time.   · Ferritin has been ordered today, however pending when the patient was here and he did not wish to wait.  It has returned now, elevated at 277.  He will need to return for phlebotomy.  He is already scheduled back in 3 months with Dr." Andrew and we will repeat ferritin at that time.  He likely needs to be having phlebotomies more than every 6 months at this point.  · Patient returns 10/6/2022 due for possible phlebotomy with ferritin pending.  Patient does not wish to wait for the ferritin in the past once again is asking to proceed with phlebotomy today.  Ferritin is currently pending and being ran stat.  Hemoglobin today is 14.  Most recently his hemoglobin has been less than 14 with ferritin in the 140 range.  We will proceed with phlebotomy today.    2.  Cigar use-we have previously discussed smoking cessation    3.  Atrial fibrillation, on warfarin, managed per cardiology.     4.  Sick sinus syndrome    5.  Prostate cancer [place] 3+3 post radiation 2018 with rising PSA-in 2020  · Prostate biopsy negative in 2 /21   · CT scan with enlarged IMV chain lymph node measuring 11 mm PSA 8.5-4/21  · Axumin PET positive in this lymph node-local recurrence  · ADT initiated by urology  · Radiation given focused to the lymph node which was PET positive  · PSA decreased to 0.167 as of August 2021  · [facility] continues [facility], due to be seen in early December 2021.  He continues on Trelstar injections.   · PSA 0.043 in 2/22  · 10/6/2022 patient reports no change in treatment plan per urology currently.  PSA pending today.    PLAN:  1. Proceed with phlebotomy today  2. Return in 4 months for CBC, ferritin, phlebotomy  3. Return in 8 months for CBC, ferritin, PSA,   4. Phlebotomize if ferritin greater than 100.  5. Patient does continue follow-up with [facility] and is receiving Trelstar.  6. See me in 1 year

## 2023-05-04 NOTE — NURSING NOTE
"Per Dr. Morales, proceed with phlebotomy today without waiting on ferritin results. BP post phlebo 98/71. Pt states he is feeling well and declines IVFs, pt states he is \"ready to go\". Pt instructed to increase fluid intake and avoid alcohol and heavy lifting over next 24hrs. Pt v/u. Discharged stable and ambulatory.     Ferritin result noted- 102.4  "

## 2023-05-05 ENCOUNTER — TELEPHONE (OUTPATIENT)
Dept: ONCOLOGY | Facility: CLINIC | Age: 73
End: 2023-05-05

## 2023-05-05 NOTE — TELEPHONE ENCOUNTER
Caller: Trung Avalos    Relationship: Self    Best call back number: [phone]  What is the best time to reach you: ANYTIME. LEAVE VM IF NEEDED.      Who are you requesting to speak with (clinical staff, provider,  specific staff member): SCHEDULING      What was the call regarding: PATIENT TRUNG CALLED TO KRISTIAN HIS NEXT LAB AND FOLLOW UP WITH DR CUMMINS.    Do you require a callback: YES

## 2023-05-11 ENCOUNTER — CLINICAL SUPPORT (OUTPATIENT)
Dept: ORTHOPEDIC SURGERY | Facility: CLINIC | Age: 73
End: 2023-05-11
Payer: MEDICARE

## 2023-05-11 VITALS — BODY MASS INDEX: 33.27 KG/M2 | HEIGHT: 67 IN | TEMPERATURE: 97.3 F | WEIGHT: 212 LBS

## 2023-05-11 DIAGNOSIS — M17.0 ARTHRITIS OF BOTH KNEES: Primary | ICD-10-CM

## 2023-05-11 PROBLEM — E66.811 OBESITY (BMI 30.0-34.9): Status: ACTIVE | Noted: 2023-05-11

## 2023-05-11 PROBLEM — E66.9 OBESITY (BMI 30.0-34.9): Status: ACTIVE | Noted: 2023-05-11

## 2023-05-11 RX ORDER — LIDOCAINE HYDROCHLORIDE 10 MG/ML
1 INJECTION, SOLUTION EPIDURAL; INFILTRATION; INTRACAUDAL; PERINEURAL
Status: COMPLETED | OUTPATIENT
Start: 2023-05-11 | End: 2023-05-11

## 2023-05-11 RX ORDER — METHYLPREDNISOLONE ACETATE 80 MG/ML
80 INJECTION, SUSPENSION INTRA-ARTICULAR; INTRALESIONAL; INTRAMUSCULAR; SOFT TISSUE
Status: COMPLETED | OUTPATIENT
Start: 2023-05-11 | End: 2023-05-11

## 2023-05-11 RX ADMIN — METHYLPREDNISOLONE ACETATE 80 MG: 80 INJECTION, SUSPENSION INTRA-ARTICULAR; INTRALESIONAL; INTRAMUSCULAR; SOFT TISSUE at 13:13

## 2023-05-11 RX ADMIN — METHYLPREDNISOLONE ACETATE 80 MG: 80 INJECTION, SUSPENSION INTRA-ARTICULAR; INTRALESIONAL; INTRAMUSCULAR; SOFT TISSUE at 13:14

## 2023-05-11 RX ADMIN — LIDOCAINE HYDROCHLORIDE 1 ML: 10 INJECTION, SOLUTION EPIDURAL; INFILTRATION; INTRACAUDAL; PERINEURAL at 13:13

## 2023-05-11 RX ADMIN — LIDOCAINE HYDROCHLORIDE 1 ML: 10 INJECTION, SOLUTION EPIDURAL; INFILTRATION; INTRACAUDAL; PERINEURAL at 13:14

## 2023-05-11 NOTE — PROGRESS NOTES
Loyd Avalos is here today for worsening Bilateral knee pain. Knee injection was discussed with the patient in detail, including indication, risks, benefits, and alternatives. Verbal consent was given for the procedure. Injection site was aseptically prepared.      KNEE Injection Procedure Note:    Large Joint Arthrocentesis: R knee  Date/Time: 5/11/2023 1:13 PM  Consent given by: patient  Site marked: site marked  Timeout: Immediately prior to procedure a time out was called to verify the correct patient, procedure, equipment, support staff and site/side marked as required   Supporting Documentation  Indications: pain, joint swelling and diagnostic evaluation   Procedure Details  Location: knee - R knee  Preparation: Patient was prepped and draped in the usual sterile fashion  Needle gauge: 21G.  Medications administered: 80 mg methylPREDNISolone acetate 80 MG/ML; 1 mL lidocaine PF 1% 1 %  Patient tolerance: patient tolerated the procedure well with no immediate complications    Large Joint Arthrocentesis: L knee  Date/Time: 5/11/2023 1:14 PM  Consent given by: patient  Site marked: site marked  Timeout: Immediately prior to procedure a time out was called to verify the correct patient, procedure, equipment, support staff and site/side marked as required   Supporting Documentation  Indications: pain   Procedure Details  Location: knee - L knee  Preparation: Patient was prepped and draped in the usual sterile fashion  Needle gauge: 21G.  Medications administered: 80 mg methylPREDNISolone acetate 80 MG/ML; 1 mL lidocaine PF 1% 1 %  Patient tolerance: patient tolerated the procedure well with no immediate complications      Loyd Avalos tolerated the procedure well. A Bandage was applied to the injection site. At the conclusion of the injection I discussed the importance of continued quad strengthening exercises on a daily basis. I will see the patient back if the symptoms should fail to improve or  worsen.    - Patient asked about gel injections. I discussed the latest research with him and the potential for insurance to not cover them. He is going to continue with cortisone injections at this time.     Minal Terrell, APRN  5/11/2023    Dictated Utilizing Dragon Dictation

## 2023-08-24 ENCOUNTER — LAB (OUTPATIENT)
Dept: LAB | Facility: HOSPITAL | Age: 73
End: 2023-08-24
Payer: MEDICARE

## 2023-08-24 ENCOUNTER — INFUSION (OUTPATIENT)
Dept: ONCOLOGY | Facility: HOSPITAL | Age: 73
End: 2023-08-24
Payer: MEDICARE

## 2023-08-24 DIAGNOSIS — E83.110 HEREDITARY HEMOCHROMATOSIS: ICD-10-CM

## 2023-08-24 LAB
BASOPHILS # BLD AUTO: 0.08 10*3/MM3 (ref 0–0.2)
BASOPHILS NFR BLD AUTO: 0.8 % (ref 0–1.5)
DEPRECATED RDW RBC AUTO: 51.3 FL (ref 37–54)
EOSINOPHIL # BLD AUTO: 0.55 10*3/MM3 (ref 0–0.4)
EOSINOPHIL NFR BLD AUTO: 5.6 % (ref 0.3–6.2)
ERYTHROCYTE [DISTWIDTH] IN BLOOD BY AUTOMATED COUNT: 13.6 % (ref 12.3–15.4)
FERRITIN SERPL-MCNC: 82.4 NG/ML (ref 30–400)
HCT VFR BLD AUTO: 38.7 % (ref 37.5–51)
HGB BLD-MCNC: 12.9 G/DL (ref 13–17.7)
IMM GRANULOCYTES # BLD AUTO: 0.02 10*3/MM3 (ref 0–0.05)
IMM GRANULOCYTES NFR BLD AUTO: 0.2 % (ref 0–0.5)
LYMPHOCYTES # BLD AUTO: 3.02 10*3/MM3 (ref 0.7–3.1)
LYMPHOCYTES NFR BLD AUTO: 30.8 % (ref 19.6–45.3)
MCH RBC QN AUTO: 34.2 PG (ref 26.6–33)
MCHC RBC AUTO-ENTMCNC: 33.3 G/DL (ref 31.5–35.7)
MCV RBC AUTO: 102.7 FL (ref 79–97)
MONOCYTES # BLD AUTO: 0.91 10*3/MM3 (ref 0.1–0.9)
MONOCYTES NFR BLD AUTO: 9.3 % (ref 5–12)
NEUTROPHILS NFR BLD AUTO: 5.21 10*3/MM3 (ref 1.7–7)
NEUTROPHILS NFR BLD AUTO: 53.3 % (ref 42.7–76)
NRBC BLD AUTO-RTO: 0 /100 WBC (ref 0–0.2)
PLATELET # BLD AUTO: 223 10*3/MM3 (ref 140–450)
PMV BLD AUTO: 9.3 FL (ref 6–12)
RBC # BLD AUTO: 3.77 10*6/MM3 (ref 4.14–5.8)
WBC NRBC COR # BLD: 9.79 10*3/MM3 (ref 3.4–10.8)

## 2023-08-24 PROCEDURE — 85025 COMPLETE CBC W/AUTO DIFF WBC: CPT

## 2023-08-24 PROCEDURE — 36415 COLL VENOUS BLD VENIPUNCTURE: CPT

## 2023-08-24 PROCEDURE — 82728 ASSAY OF FERRITIN: CPT

## 2023-08-24 NOTE — NURSING NOTE
Pt was here for possible phlebo today if ferritin >100. He did not stay for results. RN called pt to inform him he did not need a phlebo for ferriting of 82.4.    Pt v/u. No further questions.    Lab Results   Component Value Date    FERRITIN 82.40 08/24/2023

## 2023-08-28 ENCOUNTER — CLINICAL SUPPORT (OUTPATIENT)
Dept: ORTHOPEDIC SURGERY | Facility: CLINIC | Age: 73
End: 2023-08-28
Payer: MEDICARE

## 2023-08-28 VITALS — TEMPERATURE: 97.1 F | BODY MASS INDEX: 32.15 KG/M2 | HEIGHT: 68 IN | WEIGHT: 212.1 LBS

## 2023-08-28 DIAGNOSIS — M17.0 ARTHRITIS OF BOTH KNEES: ICD-10-CM

## 2023-08-28 DIAGNOSIS — R52 PAIN: Primary | ICD-10-CM

## 2023-08-28 RX ORDER — METHYLPREDNISOLONE ACETATE 80 MG/ML
80 INJECTION, SUSPENSION INTRA-ARTICULAR; INTRALESIONAL; INTRAMUSCULAR; SOFT TISSUE
Status: COMPLETED | OUTPATIENT
Start: 2023-08-28 | End: 2023-08-28

## 2023-08-28 RX ORDER — LIDOCAINE HYDROCHLORIDE 10 MG/ML
2 INJECTION, SOLUTION EPIDURAL; INFILTRATION; INTRACAUDAL; PERINEURAL
Status: COMPLETED | OUTPATIENT
Start: 2023-08-28 | End: 2023-08-28

## 2023-08-28 RX ADMIN — METHYLPREDNISOLONE ACETATE 80 MG: 80 INJECTION, SUSPENSION INTRA-ARTICULAR; INTRALESIONAL; INTRAMUSCULAR; SOFT TISSUE at 13:25

## 2023-08-28 RX ADMIN — LIDOCAINE HYDROCHLORIDE 2 ML: 10 INJECTION, SOLUTION EPIDURAL; INFILTRATION; INTRACAUDAL; PERINEURAL at 13:25

## 2023-08-28 NOTE — PROGRESS NOTES
Loyd Avalos is here today for worsening Bilateral knee pain. Knee injection was discussed with the patient in detail, including indication, risks, benefits, and alternatives. Verbal consent was given for the procedure. Injection site was aseptically prepared.    Radiology:   AP, lateral, 40 degree PA of the bilateral knee obtained in the office today due to pain, with comparison views shows advanced tricompartmental degenerative changes, most significantly  medial and patellofemoral compartment with osteophyte formation and subchondral sclerosis      KNEE Injection Procedure Note:    Large Joint Arthrocentesis: R knee  Date/Time: 8/28/2023 1:25 PM  Consent given by: patient  Site marked: site marked  Timeout: Immediately prior to procedure a time out was called to verify the correct patient, procedure, equipment, support staff and site/side marked as required   Supporting Documentation  Indications: pain, joint swelling and diagnostic evaluation   Procedure Details  Location: knee - R knee  Preparation: Patient was prepped and draped in the usual sterile fashion  Needle gauge: 21G.  Approach: anterolateral  Medications administered: 80 mg methylPREDNISolone acetate 80 MG/ML; 2 mL lidocaine PF 1% 1 %  Patient tolerance: patient tolerated the procedure well with no immediate complications      Large Joint Arthrocentesis: L knee  Date/Time: 8/28/2023 1:25 PM  Consent given by: patient  Site marked: site marked  Timeout: Immediately prior to procedure a time out was called to verify the correct patient, procedure, equipment, support staff and site/side marked as required   Supporting Documentation  Indications: pain   Procedure Details  Location: knee - L knee  Preparation: Patient was prepped and draped in the usual sterile fashion  Needle gauge: 21G.  Approach: anterolateral  Medications administered: 80 mg methylPREDNISolone acetate 80 MG/ML; 2 mL lidocaine PF 1% 1 %  Patient tolerance: patient tolerated  the procedure well with no immediate complications    Loyd Avalos tolerated the procedure well. A Bandage was applied to the injection site. At the conclusion of the injection I discussed the importance of continued quad strengthening exercises on a daily basis. I will see the patient back if the symptoms should fail to improve or worsen.    Minal Terrell, APRN  8/28/2023    Dictated Utilizing Dragon Dictation

## 2023-11-30 ENCOUNTER — LAB (OUTPATIENT)
Dept: LAB | Facility: HOSPITAL | Age: 73
End: 2023-11-30
Payer: MEDICARE

## 2023-11-30 ENCOUNTER — TELEPHONE (OUTPATIENT)
Dept: ONCOLOGY | Facility: CLINIC | Age: 73
End: 2023-11-30
Payer: MEDICARE

## 2023-11-30 ENCOUNTER — INFUSION (OUTPATIENT)
Dept: ONCOLOGY | Facility: HOSPITAL | Age: 73
End: 2023-11-30
Payer: MEDICARE

## 2023-11-30 DIAGNOSIS — E83.110 HEREDITARY HEMOCHROMATOSIS: Primary | ICD-10-CM

## 2023-11-30 DIAGNOSIS — E83.110 HEREDITARY HEMOCHROMATOSIS: ICD-10-CM

## 2023-11-30 LAB
BASOPHILS # BLD AUTO: 0.1 10*3/MM3 (ref 0–0.2)
BASOPHILS NFR BLD AUTO: 0.8 % (ref 0–1.5)
DEPRECATED RDW RBC AUTO: 50 FL (ref 37–54)
EOSINOPHIL # BLD AUTO: 0.59 10*3/MM3 (ref 0–0.4)
EOSINOPHIL NFR BLD AUTO: 4.9 % (ref 0.3–6.2)
ERYTHROCYTE [DISTWIDTH] IN BLOOD BY AUTOMATED COUNT: 13.9 % (ref 12.3–15.4)
FERRITIN SERPL-MCNC: 108 NG/ML (ref 30–400)
HCT VFR BLD AUTO: 35.5 % (ref 37.5–51)
HGB BLD-MCNC: 12.5 G/DL (ref 13–17.7)
IMM GRANULOCYTES # BLD AUTO: 0.1 10*3/MM3 (ref 0–0.05)
IMM GRANULOCYTES NFR BLD AUTO: 0.8 % (ref 0–0.5)
LYMPHOCYTES # BLD AUTO: 3.1 10*3/MM3 (ref 0.7–3.1)
LYMPHOCYTES NFR BLD AUTO: 25.5 % (ref 19.6–45.3)
MCH RBC QN AUTO: 34.6 PG (ref 26.6–33)
MCHC RBC AUTO-ENTMCNC: 35.2 G/DL (ref 31.5–35.7)
MCV RBC AUTO: 98.3 FL (ref 79–97)
MONOCYTES # BLD AUTO: 1.21 10*3/MM3 (ref 0.1–0.9)
MONOCYTES NFR BLD AUTO: 10 % (ref 5–12)
NEUTROPHILS NFR BLD AUTO: 58 % (ref 42.7–76)
NEUTROPHILS NFR BLD AUTO: 7.04 10*3/MM3 (ref 1.7–7)
NRBC BLD AUTO-RTO: 0 /100 WBC (ref 0–0.2)
PLATELET # BLD AUTO: 239 10*3/MM3 (ref 140–450)
PMV BLD AUTO: 9.8 FL (ref 6–12)
RBC # BLD AUTO: 3.61 10*6/MM3 (ref 4.14–5.8)
WBC NRBC COR # BLD AUTO: 12.14 10*3/MM3 (ref 3.4–10.8)

## 2023-11-30 PROCEDURE — 36415 COLL VENOUS BLD VENIPUNCTURE: CPT

## 2023-11-30 PROCEDURE — 85025 COMPLETE CBC W/AUTO DIFF WBC: CPT

## 2023-11-30 PROCEDURE — 82728 ASSAY OF FERRITIN: CPT

## 2023-11-30 NOTE — TELEPHONE ENCOUNTER
----- Message from Rufus Chawla sent at 11/30/2023  3:28 PM EST -----    ----- Message -----  From: Hanh Mcbride RN  Sent: 11/30/2023   3:24 PM EST  To: [facility]    Please schedule patient for phelbo next Thursday no  labs needed. Call patient with apt

## 2023-11-30 NOTE — NURSING NOTE
Patient did not want to wait on labs. Ferritin 108 and needs phelbo. Called patient and message sent to scheduling.

## 2023-12-07 ENCOUNTER — INFUSION (OUTPATIENT)
Dept: ONCOLOGY | Facility: HOSPITAL | Age: 73
End: 2023-12-07
Payer: MEDICARE

## 2023-12-07 VITALS
HEART RATE: 100 BPM | BODY MASS INDEX: 33.39 KG/M2 | SYSTOLIC BLOOD PRESSURE: 110 MMHG | OXYGEN SATURATION: 96 % | WEIGHT: 219.6 LBS | RESPIRATION RATE: 16 BRPM | TEMPERATURE: 97.3 F | DIASTOLIC BLOOD PRESSURE: 70 MMHG

## 2023-12-07 DIAGNOSIS — E83.119 HEMOCHROMATOSIS, UNSPECIFIED HEMOCHROMATOSIS TYPE: Primary | ICD-10-CM

## 2023-12-07 PROCEDURE — 99195 PHLEBOTOMY: CPT

## 2023-12-19 ENCOUNTER — CLINICAL SUPPORT (OUTPATIENT)
Dept: ORTHOPEDIC SURGERY | Facility: CLINIC | Age: 73
End: 2023-12-19
Payer: MEDICARE

## 2023-12-19 VITALS — HEIGHT: 67 IN | WEIGHT: 220 LBS | BODY MASS INDEX: 34.53 KG/M2 | TEMPERATURE: 97.5 F

## 2023-12-19 DIAGNOSIS — M17.0 ARTHRITIS OF BOTH KNEES: Primary | ICD-10-CM

## 2023-12-19 RX ORDER — LIDOCAINE HYDROCHLORIDE 10 MG/ML
2 INJECTION, SOLUTION EPIDURAL; INFILTRATION; INTRACAUDAL; PERINEURAL
Status: COMPLETED | OUTPATIENT
Start: 2023-12-19 | End: 2023-12-19

## 2023-12-19 RX ORDER — METHYLPREDNISOLONE ACETATE 80 MG/ML
80 INJECTION, SUSPENSION INTRA-ARTICULAR; INTRALESIONAL; INTRAMUSCULAR; SOFT TISSUE
Status: COMPLETED | OUTPATIENT
Start: 2023-12-19 | End: 2023-12-19

## 2023-12-19 RX ADMIN — LIDOCAINE HYDROCHLORIDE 2 ML: 10 INJECTION, SOLUTION EPIDURAL; INFILTRATION; INTRACAUDAL; PERINEURAL at 14:47

## 2023-12-19 RX ADMIN — METHYLPREDNISOLONE ACETATE 80 MG: 80 INJECTION, SUSPENSION INTRA-ARTICULAR; INTRALESIONAL; INTRAMUSCULAR; SOFT TISSUE at 14:47

## 2023-12-19 NOTE — PROGRESS NOTES
Loyd Avalos is here today for worsening Bilateral knee pain. Knee injection was discussed with the patient in detail, including indication, risks, benefits, and alternatives. Verbal consent was given for the procedure. Injection site was aseptically prepared.      KNEE Injection Procedure Note:    Large Joint Arthrocentesis: R knee  Date/Time: 12/19/2023 2:47 PM  Consent given by: patient  Site marked: site marked  Timeout: Immediately prior to procedure a time out was called to verify the correct patient, procedure, equipment, support staff and site/side marked as required   Supporting Documentation  Indications: pain, joint swelling and diagnostic evaluation   Procedure Details  Location: knee - R knee  Preparation: Patient was prepped and draped in the usual sterile fashion  Needle gauge: 21G.  Medications administered: 80 mg methylPREDNISolone acetate 80 MG/ML; 2 mL lidocaine PF 1% 1 %  Patient tolerance: patient tolerated the procedure well with no immediate complications      Large Joint Arthrocentesis: L knee  Date/Time: 12/19/2023 2:47 PM  Consent given by: patient  Site marked: site marked  Timeout: Immediately prior to procedure a time out was called to verify the correct patient, procedure, equipment, support staff and site/side marked as required   Supporting Documentation  Indications: pain   Procedure Details  Location: knee - L knee  Preparation: Patient was prepped and draped in the usual sterile fashion  Needle gauge: 21G.  Medications administered: 80 mg methylPREDNISolone acetate 80 MG/ML; 2 mL lidocaine PF 1% 1 %  Patient tolerance: patient tolerated the procedure well with no immediate complications     Loyd Avalos tolerated the procedure well. A Bandage was applied to the injection site. At the conclusion of the injection I discussed the importance of continued quad strengthening exercises on a daily basis. I will see the patient back if the symptoms should fail to improve  or worsen.    Minal Terrell, APRN  12/19/2023    Dictated Utilizing Dragon Dictation

## 2024-02-13 ENCOUNTER — TELEPHONE (OUTPATIENT)
Dept: ONCOLOGY | Facility: CLINIC | Age: 74
End: 2024-02-13

## 2024-02-13 NOTE — TELEPHONE ENCOUNTER
Caller: Loyd Avalos    Relationship to patient: Self    Best call back number: [phone]    Chief complaint: NEEDS EARLIER TIME    Type of visit: LAB, F/U 1 & INFUSION    Requested date: AROUND 10 OR 11, CALL TO R/S     If rescheduling, when is the original appointment: 3/7/2024    Additional notes:

## 2024-03-29 ENCOUNTER — OFFICE VISIT (OUTPATIENT)
Dept: ONCOLOGY | Facility: CLINIC | Age: 74
End: 2024-03-29
Payer: MEDICARE

## 2024-03-29 ENCOUNTER — APPOINTMENT (OUTPATIENT)
Dept: ONCOLOGY | Facility: HOSPITAL | Age: 74
End: 2024-03-29
Payer: MEDICARE

## 2024-03-29 ENCOUNTER — LAB (OUTPATIENT)
Dept: LAB | Facility: HOSPITAL | Age: 74
End: 2024-03-29
Payer: MEDICARE

## 2024-03-29 VITALS
OXYGEN SATURATION: 95 % | DIASTOLIC BLOOD PRESSURE: 68 MMHG | HEIGHT: 67 IN | TEMPERATURE: 97.3 F | HEART RATE: 104 BPM | SYSTOLIC BLOOD PRESSURE: 95 MMHG | BODY MASS INDEX: 33.1 KG/M2 | RESPIRATION RATE: 16 BRPM | WEIGHT: 210.9 LBS

## 2024-03-29 DIAGNOSIS — E83.110 HEREDITARY HEMOCHROMATOSIS: ICD-10-CM

## 2024-03-29 DIAGNOSIS — C61 MALIGNANT NEOPLASM OF PROSTATE: ICD-10-CM

## 2024-03-29 DIAGNOSIS — C61 MALIGNANT NEOPLASM OF PROSTATE: Primary | ICD-10-CM

## 2024-03-29 LAB
BASOPHILS # BLD AUTO: 0.1 10*3/MM3 (ref 0–0.2)
BASOPHILS NFR BLD AUTO: 1.1 % (ref 0–1.5)
DEPRECATED RDW RBC AUTO: 53.3 FL (ref 37–54)
EOSINOPHIL # BLD AUTO: 0.54 10*3/MM3 (ref 0–0.4)
EOSINOPHIL NFR BLD AUTO: 5.7 % (ref 0.3–6.2)
ERYTHROCYTE [DISTWIDTH] IN BLOOD BY AUTOMATED COUNT: 14.4 % (ref 12.3–15.4)
FERRITIN SERPL-MCNC: 114 NG/ML (ref 30–400)
HCT VFR BLD AUTO: 37.7 % (ref 37.5–51)
HGB BLD-MCNC: 12.5 G/DL (ref 13–17.7)
IMM GRANULOCYTES # BLD AUTO: 0.03 10*3/MM3 (ref 0–0.05)
IMM GRANULOCYTES NFR BLD AUTO: 0.3 % (ref 0–0.5)
LYMPHOCYTES # BLD AUTO: 2.96 10*3/MM3 (ref 0.7–3.1)
LYMPHOCYTES NFR BLD AUTO: 31.2 % (ref 19.6–45.3)
MCH RBC QN AUTO: 33.7 PG (ref 26.6–33)
MCHC RBC AUTO-ENTMCNC: 33.2 G/DL (ref 31.5–35.7)
MCV RBC AUTO: 101.6 FL (ref 79–97)
MONOCYTES # BLD AUTO: 0.7 10*3/MM3 (ref 0.1–0.9)
MONOCYTES NFR BLD AUTO: 7.4 % (ref 5–12)
NEUTROPHILS NFR BLD AUTO: 5.15 10*3/MM3 (ref 1.7–7)
NEUTROPHILS NFR BLD AUTO: 54.3 % (ref 42.7–76)
NRBC BLD AUTO-RTO: 0 /100 WBC (ref 0–0.2)
PLATELET # BLD AUTO: 246 10*3/MM3 (ref 140–450)
PMV BLD AUTO: 9.4 FL (ref 6–12)
PSA SERPL-MCNC: <0.014 NG/ML (ref 0–4)
RBC # BLD AUTO: 3.71 10*6/MM3 (ref 4.14–5.8)
WBC NRBC COR # BLD AUTO: 9.48 10*3/MM3 (ref 3.4–10.8)

## 2024-03-29 PROCEDURE — 84153 ASSAY OF PSA TOTAL: CPT | Performed by: INTERNAL MEDICINE

## 2024-03-29 PROCEDURE — 36415 COLL VENOUS BLD VENIPUNCTURE: CPT

## 2024-03-29 PROCEDURE — 85025 COMPLETE CBC W/AUTO DIFF WBC: CPT

## 2024-03-29 PROCEDURE — 82728 ASSAY OF FERRITIN: CPT

## 2024-03-29 NOTE — PROGRESS NOTES
REASONS FOR FOLLOWUP:    Hemochromatosis and metastatic prostate cancer    HISTORY OF PRESENT ILLNESS:  The patient is a 73 y.o. year old male with hemochromatosis, undergoing phlebotomy for ferritin greater than 100.  He has been receiving phlebotomy every 6-8 months   He denies feeling any differently whether his ferritin is closer to 100 or higher.  He denies any recent changes to his health.    Hemoglobin remains low normal at 12.7 today.  He has not had a phlebotomy since December and his ferritin is 114 today and he wants to hold off therapy and we will wait till his next visit    After radiation he has a recurrence in the retroperitoneum his PSA level is 0.0 and patient [facility].  Patient remains on Trelstar every 6 months.  He denies any recent changes.      He continues to smoke 1 cigar a day and stop smoking 15 years ago but he still having routine chest screening scans the last 1 was in September 2023.  His dad did have small cell lung cancer related to smoking in his 60s    He needs to have routine colonoscopy is also scheduled    Past Medical History:   Diagnosis Date    Atrial fibrillation     Atrial flutter     Diabetes     Gout     Heart disease     Status post pacemaker implantation    Hemochromatosis     History of syncope     Hyperlipidemia     Malignant neoplasm of prostate 2018    RBBB (right bundle branch block)     Sleep apnea     SSS (sick sinus syndrome)     SVT (supraventricular tachycardia)      Past Surgical History:   Procedure Laterality Date    CARDIAC ABLATION  2010    Paroxysmal atrial fib/Dr. Koo    CARDIAC ABLATION  2012    Recurrent atrial flutter/Dr. Koo    CARDIAC CATHETERIZATION      CARDIAC PACEMAKER PLACEMENT  2014    Medtronic dual chamber/Dr. Manriquez    KNEE SURGERY Right     PACEMAKER IMPLANTATION  05/2006       HEMATOLOGIC/ONCOLOGIC HISTORY:  (History from previous dates can be found in the separate document.)    11/19    The patient is a  69 y.o. year old male formally seen by Dr. Aquino in this office, with hemochromatosis on intermittent phlebotomy the patient has been undergoing phlebotomy roughly every 3 months during 2019.  His last two ferritin measurements were in the 90s.  Ferritin level today is 80.  He cannot stay today and wants to do 3 phlebotomies per year which is very reasonable     He is still smoking cigars and I try to talk him into stopping this  He drinks 6-8 beers a week and sometimes more  Is not had a colonoscopy recently and has not followed up with a cardiologist in a while And remains on Xarelto for his atrial fibrillation     5/21    As he is reviewed back today he tells me his PSA has been going up recently after his prostate cancer radiation 3 years ago and Dr. abdullahi biopsied his prostate and this was negative and did a CAT scan of the abdomen which showed a mildly enlarged lymph node in the periaortic area which is new PSA is up to 6.46     He told me  wanted to discuss the situation with him and I told him we would do an Axumin PET scan repeat his PSA and discussed with Dr. Abdullahi about Lupron with or without abiraterone    6/21  He is here to review the results of his Axumin PET scan which shows 1 heron metastasis in the IMV area  oligometastatic and he got his first dose of his ADT yesterday  And is scheduled to see Dr. Abdullahi in 6 weeks     11/21  Patient's last PET scan performed in May 2021 showed continued uptake locally in the pelvis but no other evidence of metastatic disease.  Additional imaging was canceled per Dr. Madrigal as it was not felt the patient needed to repeat PET scan at this time.  His PSA when last checked had improved from 8.4 down to 0.167.      MEDICATIONS    Current Outpatient Medications:     allopurinol (ZYLOPRIM) 300 MG tablet, Take 1 tablet by mouth Daily., Disp: , Rfl:     atorvastatin (LIPITOR) 40 MG tablet, Take 1 tablet by mouth Daily., Disp: , Rfl:     cyclobenzaprine (FLEXERIL) 10 MG  "tablet, , Disp: , Rfl:     lisinopril (PRINIVIL,ZESTRIL) 10 MG tablet, Take 1 tablet by mouth Daily., Disp: , Rfl:     lisinopril (PRINIVIL,ZESTRIL) 20 MG tablet, , Disp: , Rfl:     metFORMIN (GLUCOPHAGE) 500 MG tablet, Take 1 tablet by mouth Daily With Breakfast., Disp: , Rfl:     metoprolol succinate XL (TOPROL-XL) 100 MG 24 hr tablet, Take 1 tablet by mouth Daily., Disp: , Rfl:     Multiple Vitamins-Minerals (MULTIVITAMIN ADULT PO), Take  by mouth., Disp: , Rfl:     warfarin (COUMADIN) 5 MG tablet, Take 1 tablet by mouth Daily., Disp: , Rfl:     ALLERGIES:   No Known Allergies    SOCIAL HISTORY:       Social History     Socioeconomic History    Marital status:      Spouse name: Suzie    Years of education: High School   Tobacco Use    Smoking status: Some Days     Types: Cigars     Passive exposure: Current    Smokeless tobacco: Never   Vaping Use    Vaping status: Never Used   Substance and Sexual Activity    Alcohol use: Yes     Comment: Moderate \"few beers daily\"    Drug use: No    Sexual activity: Defer         FAMILY HISTORY:  Family History   Problem Relation Age of Onset    Cancer Other         LYMPH & BREAST    Diabetes Other     Hypertension Other     Heart disease Other     Breast cancer Mother     Lymphoma Father     Hemochromatosis Father        REVIEW OF SYSTEMS:  GENERAL: No change in appetite or weight; fatigue  Hot flashes on lupron.    SKIN: No nonhealing lesions.   No rashes.  HEME/LYMPH: No swollen nodes.   EYES: No vision changes or diplopia.   ENT: No tinnitus, hearing loss, gum bleeding, epistaxis, hoarseness or dysphagia.   RESPIRATORY: No cough, shortness of breath, hemoptysis or wheezing.   CVS: No chest pain, palpitations, orthopnea, dyspnea on exertion or PND.   GI: No melena or hematochezia.   No abdominal pain.  No nausea, vomiting, constipation, diarrhea  : No dysuria or hematuria.   MUSCULOSKELETAL: Chronic arthralgias.    NEUROLOGICAL: No global weakness, loss of " "consciousness or seizures.   ROS reviewed 03/29/24    Objective    Vitals:    03/29/24 1017   BP: 95/68   Pulse: 104   Resp: 16   Temp: 97.3 °F (36.3 °C)   TempSrc: Temporal   SpO2: 95%   Weight: 95.7 kg (210 lb 14.4 oz)   Height: 170.2 cm (67.01\")   PainSc: 0-No pain         3/29/2024    10:17 AM   Current Status   ECOG score 0       PHYSICAL EXAM:    GENERAL:  Well-developed, well-nourished in no acute distress.   SKIN:  Warm, dry without rashes, purpura or petechiae.  EYES:  Pupils equal, round and reactive to light.  EOMs intact.  Conjunctivae normal.  EARS:  Hearing intact.  CHEST:  Lungs clear to auscultation. Good airflow.  CARDIAC:  Regular rate and rhythm.  EXTREMITIES:  No clubbing, cyanosis or edema.  NEUROLOGICAL:  No focal neurological deficits.  PSYCHIATRIC: Agitated affect and mood.     I have reexamined the patient and the results are consistent with the previously documented exam. Jewel Morales MD       RECENT LABS:  Results from last 7 days   Lab Units 03/29/24  0934   WBC 10*3/mm3 9.48   NEUTROS ABS 10*3/mm3 5.15   HEMOGLOBIN g/dL 12.5*   HEMATOCRIT % 37.7   PLATELETS 10*3/mm3 246     Results from last 7 days   Lab Units 03/29/24  0934   FERRITIN ng/mL 114.00                 Axumin PET  IMPRESSION:  Solitary mildly enlarged lymph node in the posterior aspect  of the pelvis in the presacral region along the course of the inferior  mesenteric vessels showing significant uptake of fluciclovine consistent  with localized metastatic disease. There is no evidence of metastatic  disease elsewhere within the neck, chest, abdomen or pelvis.     This report was finalized on 5/20/2021    Assessment & Plan      ASSESSMENT:  1. Hereditary hemochromatosis:   Patient historically undergoing phlebotomy 2-3 times a year.    Most recent ferritin level checked in May 2021, 172 leading to phlebotomy at that time.   Ferritin has been ordered today, however pending when the patient was here and he did not wish " to wait.  It has returned now, elevated at 277.  He will need to return for phlebotomy.  He is already scheduled back in 3 months with Dr. Morales and we will repeat ferritin at that time.  He likely needs to be having phlebotomies more than every 6 months at this point.  Patient returns 10/6/2022 due for possible phlebotomy with ferritin pending.  Patient does not wish to wait for the ferritin in the past once again is asking to proceed with phlebotomy today.  Ferritin is currently pending and being ran stat.  Hemoglobin today is 14.  Most recently his hemoglobin has been less than 14 with ferritin in the 140 range.  We will proceed with phlebotomy today.    2.  Cigar use-we have previously discussed smoking cessation    3.  Atrial fibrillation, on warfarin, managed per cardiology.     4.  Sick sinus syndrome    5.  Prostate cancer Crystal 3+3 post radiation 2018 with rising PSA-in 2020  Prostate biopsy negative in 2 /21   CT scan with enlarged IMV chain lymph node measuring 11 mm PSA 8.5-4/21  Axumin PET positive in this lymph node-local recurrence  ADT initiated by urology  Radiation given focused to the lymph node which was PET positive  PSA decreased to 0.167 as of August 2021  Patient continues [facility], due to be seen in early December 2021.  He continues on Trelstar injections.   PSA 0.043 in 2/22  10/6/2022 patient reports no change in treatment plan per urology currently.  PSA pending today.    PLAN:  Return in 4 months for CBC, ferritin, phlebotomy  Return in 8 months for CBC, ferritin, ,   Phlebotomize if ferritin greater than 100.  Patient does continue follow-up with [facility] and is receiving Trelstar.  See me in 1 year

## 2024-04-02 ENCOUNTER — OFFICE VISIT (OUTPATIENT)
Dept: ORTHOPEDIC SURGERY | Facility: CLINIC | Age: 74
End: 2024-04-02
Payer: MEDICARE

## 2024-04-02 VITALS — WEIGHT: 213.4 LBS | BODY MASS INDEX: 33.49 KG/M2 | TEMPERATURE: 98.2 F | HEIGHT: 67 IN

## 2024-04-02 DIAGNOSIS — R52 PAIN: Primary | ICD-10-CM

## 2024-04-02 DIAGNOSIS — M17.0 ARTHRITIS OF BOTH KNEES: ICD-10-CM

## 2024-04-02 RX ORDER — METHYLPREDNISOLONE ACETATE 80 MG/ML
80 INJECTION, SUSPENSION INTRA-ARTICULAR; INTRALESIONAL; INTRAMUSCULAR; SOFT TISSUE
Status: COMPLETED | OUTPATIENT
Start: 2024-04-02 | End: 2024-04-02

## 2024-04-02 RX ORDER — LIDOCAINE HYDROCHLORIDE 10 MG/ML
2 INJECTION, SOLUTION EPIDURAL; INFILTRATION; INTRACAUDAL; PERINEURAL
Status: COMPLETED | OUTPATIENT
Start: 2024-04-02 | End: 2024-04-02

## 2024-04-02 RX ADMIN — LIDOCAINE HYDROCHLORIDE 2 ML: 10 INJECTION, SOLUTION EPIDURAL; INFILTRATION; INTRACAUDAL; PERINEURAL at 13:02

## 2024-04-02 RX ADMIN — LIDOCAINE HYDROCHLORIDE 2 ML: 10 INJECTION, SOLUTION EPIDURAL; INFILTRATION; INTRACAUDAL; PERINEURAL at 13:03

## 2024-04-02 RX ADMIN — METHYLPREDNISOLONE ACETATE 80 MG: 80 INJECTION, SUSPENSION INTRA-ARTICULAR; INTRALESIONAL; INTRAMUSCULAR; SOFT TISSUE at 13:03

## 2024-04-02 RX ADMIN — METHYLPREDNISOLONE ACETATE 80 MG: 80 INJECTION, SUSPENSION INTRA-ARTICULAR; INTRALESIONAL; INTRAMUSCULAR; SOFT TISSUE at 13:02

## 2024-04-02 NOTE — PROGRESS NOTES
Loyd Avalos is here today for worsening Bilateral knee pain. Knee injection was discussed with the patient in detail, including indication, risks, benefits, and alternatives. Verbal consent was given for the procedure. Injection site was aseptically prepared.    Radiology:   AP, lateral, 40 degree PA of the bilateral knee obtained in the office today due to pain, with comparison views shows advanced tricompartmental degenerative changes, most significantly  medial and patellofemoral compartment with osteophyte formation and subchondral sclerosis      KNEE Injection Procedure Note:    Large Joint Arthrocentesis: R knee  Date/Time: 4/2/2024 1:02 PM  Consent given by: patient  Site marked: site marked  Timeout: Immediately prior to procedure a time out was called to verify the correct patient, procedure, equipment, support staff and site/side marked as required   Supporting Documentation  Indications: pain, joint swelling and diagnostic evaluation   Procedure Details  Location: knee - R knee  Preparation: Patient was prepped and draped in the usual sterile fashion  Needle gauge: 21G.  Medications administered: 80 mg methylPREDNISolone acetate 80 MG/ML; 2 mL lidocaine PF 1% 1 %  Patient tolerance: patient tolerated the procedure well with no immediate complications      Large Joint Arthrocentesis: L knee  Date/Time: 4/2/2024 1:03 PM  Consent given by: patient  Site marked: site marked  Timeout: Immediately prior to procedure a time out was called to verify the correct patient, procedure, equipment, support staff and site/side marked as required   Supporting Documentation  Indications: pain   Procedure Details  Location: knee - L knee  Preparation: Patient was prepped and draped in the usual sterile fashion  Needle gauge: 21G.  Medications administered: 80 mg methylPREDNISolone acetate 80 MG/ML; 2 mL lidocaine PF 1% 1 %  Patient tolerance: patient tolerated the procedure well with no immediate complications      Loyd Avalos tolerated the procedure well. A Bandage was applied to the injection site. At the conclusion of the injection I discussed the importance of continued quad strengthening exercises on a daily basis. I will see the patient back if the symptoms should fail to improve or worsen.    Minal Terrell, ENA  4/2/2024    Dictated Utilizing Dragon Dictation

## 2024-07-16 ENCOUNTER — OFFICE VISIT (OUTPATIENT)
Dept: ORTHOPEDIC SURGERY | Facility: CLINIC | Age: 74
End: 2024-07-16
Payer: MEDICARE

## 2024-07-16 VITALS — TEMPERATURE: 96.9 F | HEIGHT: 67 IN | WEIGHT: 210.8 LBS | BODY MASS INDEX: 33.09 KG/M2

## 2024-07-16 DIAGNOSIS — M17.0 ARTHRITIS OF BOTH KNEES: Primary | ICD-10-CM

## 2024-07-16 PROCEDURE — 20610 DRAIN/INJ JOINT/BURSA W/O US: CPT | Performed by: NURSE PRACTITIONER

## 2024-07-16 RX ORDER — METHYLPREDNISOLONE ACETATE 80 MG/ML
80 INJECTION, SUSPENSION INTRA-ARTICULAR; INTRALESIONAL; INTRAMUSCULAR; SOFT TISSUE
Status: COMPLETED | OUTPATIENT
Start: 2024-07-16 | End: 2024-07-16

## 2024-07-16 RX ORDER — LIDOCAINE HYDROCHLORIDE 10 MG/ML
2 INJECTION, SOLUTION EPIDURAL; INFILTRATION; INTRACAUDAL; PERINEURAL
Status: COMPLETED | OUTPATIENT
Start: 2024-07-16 | End: 2024-07-16

## 2024-07-16 RX ADMIN — LIDOCAINE HYDROCHLORIDE 2 ML: 10 INJECTION, SOLUTION EPIDURAL; INFILTRATION; INTRACAUDAL; PERINEURAL at 09:46

## 2024-07-16 RX ADMIN — METHYLPREDNISOLONE ACETATE 80 MG: 80 INJECTION, SUSPENSION INTRA-ARTICULAR; INTRALESIONAL; INTRAMUSCULAR; SOFT TISSUE at 09:46

## 2024-07-16 RX ADMIN — LIDOCAINE HYDROCHLORIDE 2 ML: 10 INJECTION, SOLUTION EPIDURAL; INFILTRATION; INTRACAUDAL; PERINEURAL at 09:45

## 2024-07-16 RX ADMIN — METHYLPREDNISOLONE ACETATE 80 MG: 80 INJECTION, SUSPENSION INTRA-ARTICULAR; INTRALESIONAL; INTRAMUSCULAR; SOFT TISSUE at 09:45

## 2024-07-16 NOTE — PROGRESS NOTES
Loyd Avalos is here today for worsening Bilateral knee pain. Knee injection was discussed with the patient in detail, including indication, risks, benefits, and alternatives. Verbal consent was given for the procedure. Injection site was aseptically prepared.      KNEE Injection Procedure Note:    Large Joint Arthrocentesis: R knee  Date/Time: 7/16/2024 9:45 AM  Consent given by: patient  Site marked: site marked  Timeout: Immediately prior to procedure a time out was called to verify the correct patient, procedure, equipment, support staff and site/side marked as required   Supporting Documentation  Indications: pain, joint swelling and diagnostic evaluation   Procedure Details  Location: knee - R knee  Preparation: Patient was prepped and draped in the usual sterile fashion  Needle gauge: 21G.  Medications administered: 80 mg methylPREDNISolone acetate 80 MG/ML; 2 mL lidocaine PF 1% 1 %  Patient tolerance: patient tolerated the procedure well with no immediate complications      Large Joint Arthrocentesis: L knee  Date/Time: 7/16/2024 9:46 AM  Consent given by: patient  Site marked: site marked  Timeout: Immediately prior to procedure a time out was called to verify the correct patient, procedure, equipment, support staff and site/side marked as required   Supporting Documentation  Indications: pain   Procedure Details  Location: knee - L knee  Preparation: Patient was prepped and draped in the usual sterile fashion  Needle gauge: 21G.  Medications administered: 80 mg methylPREDNISolone acetate 80 MG/ML; 2 mL lidocaine PF 1% 1 %  Patient tolerance: patient tolerated the procedure well with no immediate complications     Loyd Avalos tolerated the procedure well. A Bandage was applied to the injection site. At the conclusion of the injection I discussed the importance of continued quad strengthening exercises on a daily basis. I will see the patient back if the symptoms should fail to improve or  worsen.    Minal Terrell, APRN  7/16/2024    Dictated Utilizing Dragon Dictation

## 2024-07-22 RX ORDER — SODIUM CHLORIDE 9 MG/ML
250 INJECTION, SOLUTION INTRAVENOUS ONCE
Status: CANCELLED | OUTPATIENT
Start: 2024-07-22

## 2024-07-25 ENCOUNTER — INFUSION (OUTPATIENT)
Dept: ONCOLOGY | Facility: HOSPITAL | Age: 74
End: 2024-07-25
Payer: MEDICARE

## 2024-07-25 ENCOUNTER — LAB (OUTPATIENT)
Dept: LAB | Facility: HOSPITAL | Age: 74
End: 2024-07-25
Payer: MEDICARE

## 2024-07-25 DIAGNOSIS — C61 MALIGNANT NEOPLASM OF PROSTATE: ICD-10-CM

## 2024-07-25 LAB
BASOPHILS # BLD AUTO: 0.07 10*3/MM3 (ref 0–0.2)
BASOPHILS NFR BLD AUTO: 0.5 % (ref 0–1.5)
DEPRECATED RDW RBC AUTO: 50.4 FL (ref 37–54)
EOSINOPHIL # BLD AUTO: 0.28 10*3/MM3 (ref 0–0.4)
EOSINOPHIL NFR BLD AUTO: 2.1 % (ref 0.3–6.2)
ERYTHROCYTE [DISTWIDTH] IN BLOOD BY AUTOMATED COUNT: 13.6 % (ref 12.3–15.4)
FERRITIN SERPL-MCNC: 113 NG/ML (ref 30–400)
HCT VFR BLD AUTO: 39.6 % (ref 37.5–51)
HGB BLD-MCNC: 13.5 G/DL (ref 13–17.7)
IMM GRANULOCYTES # BLD AUTO: 0.04 10*3/MM3 (ref 0–0.05)
IMM GRANULOCYTES NFR BLD AUTO: 0.3 % (ref 0–0.5)
LYMPHOCYTES # BLD AUTO: 3.16 10*3/MM3 (ref 0.7–3.1)
LYMPHOCYTES NFR BLD AUTO: 24.1 % (ref 19.6–45.3)
MCH RBC QN AUTO: 34.4 PG (ref 26.6–33)
MCHC RBC AUTO-ENTMCNC: 34.1 G/DL (ref 31.5–35.7)
MCV RBC AUTO: 100.8 FL (ref 79–97)
MONOCYTES # BLD AUTO: 1.14 10*3/MM3 (ref 0.1–0.9)
MONOCYTES NFR BLD AUTO: 8.7 % (ref 5–12)
NEUTROPHILS NFR BLD AUTO: 64.3 % (ref 42.7–76)
NEUTROPHILS NFR BLD AUTO: 8.4 10*3/MM3 (ref 1.7–7)
NRBC BLD AUTO-RTO: 0 /100 WBC (ref 0–0.2)
PLATELET # BLD AUTO: 239 10*3/MM3 (ref 140–450)
PMV BLD AUTO: 9.2 FL (ref 6–12)
RBC # BLD AUTO: 3.93 10*6/MM3 (ref 4.14–5.8)
WBC NRBC COR # BLD AUTO: 13.09 10*3/MM3 (ref 3.4–10.8)

## 2024-07-25 PROCEDURE — 36415 COLL VENOUS BLD VENIPUNCTURE: CPT

## 2024-07-25 PROCEDURE — 82728 ASSAY OF FERRITIN: CPT

## 2024-07-25 PROCEDURE — 85025 COMPLETE CBC W/AUTO DIFF WBC: CPT

## 2024-07-25 NOTE — NURSING NOTE
Pt here for a phlebotomy to keep Ferritin <100. Pt left before labs were resulted. Ferritin 113. Called pt and he agreed to return tomorrow at 1 pm for a phlebotomy. Message sent to scheduling.

## 2024-07-26 ENCOUNTER — INFUSION (OUTPATIENT)
Dept: ONCOLOGY | Facility: HOSPITAL | Age: 74
End: 2024-07-26
Payer: MEDICARE

## 2024-07-26 VITALS
TEMPERATURE: 97.7 F | DIASTOLIC BLOOD PRESSURE: 67 MMHG | WEIGHT: 205.2 LBS | HEART RATE: 73 BPM | RESPIRATION RATE: 16 BRPM | SYSTOLIC BLOOD PRESSURE: 97 MMHG | OXYGEN SATURATION: 97 % | BODY MASS INDEX: 32.14 KG/M2

## 2024-07-26 DIAGNOSIS — E83.110 HEREDITARY HEMOCHROMATOSIS: Primary | ICD-10-CM

## 2024-07-26 PROCEDURE — 99195 PHLEBOTOMY: CPT

## 2024-07-26 PROCEDURE — 25810000003 SODIUM CHLORIDE 0.9 % SOLUTION: Performed by: INTERNAL MEDICINE

## 2024-07-26 RX ORDER — SODIUM CHLORIDE 9 MG/ML
250 INJECTION, SOLUTION INTRAVENOUS ONCE
Status: COMPLETED | OUTPATIENT
Start: 2024-07-26 | End: 2024-07-26

## 2024-07-26 RX ORDER — SODIUM CHLORIDE 9 MG/ML
250 INJECTION, SOLUTION INTRAVENOUS ONCE
OUTPATIENT
Start: 2024-08-02

## 2024-07-26 RX ADMIN — SODIUM CHLORIDE 250 ML: 9 INJECTION, SOLUTION INTRAVENOUS at 13:31

## 2024-07-26 NOTE — NURSING NOTE
Pt here for phlebo.  Phlebo for Ferritin over 100.  Pt had ferritin of 113.  500 cc removed per order.  Pt initially did not want IVFs.  B/P post phlebo 92/64.  Pt encouraged to take IVFs.  Pt agreeable.  250 cc NS infused.  B/P post IVFs 97/67.  Denies feeling dizzy or light headed.  Discharged ambulating ad tara.  No distress.

## 2024-10-17 ENCOUNTER — OFFICE VISIT (OUTPATIENT)
Dept: ORTHOPEDIC SURGERY | Facility: CLINIC | Age: 74
End: 2024-10-17
Payer: MEDICARE

## 2024-10-17 VITALS — BODY MASS INDEX: 32.18 KG/M2 | TEMPERATURE: 97.5 F | WEIGHT: 205 LBS | HEIGHT: 67 IN

## 2024-10-17 DIAGNOSIS — R52 PAIN: Primary | ICD-10-CM

## 2024-10-17 DIAGNOSIS — M17.0 PRIMARY OSTEOARTHRITIS OF BOTH KNEES: ICD-10-CM

## 2024-10-17 RX ORDER — LIDOCAINE HYDROCHLORIDE 10 MG/ML
2 INJECTION, SOLUTION EPIDURAL; INFILTRATION; INTRACAUDAL; PERINEURAL
Status: COMPLETED | OUTPATIENT
Start: 2024-10-17 | End: 2024-10-17

## 2024-10-17 RX ORDER — METHYLPREDNISOLONE ACETATE 80 MG/ML
1 INJECTION, SUSPENSION INTRA-ARTICULAR; INTRALESIONAL; INTRAMUSCULAR; SOFT TISSUE
Status: COMPLETED | OUTPATIENT
Start: 2024-10-17 | End: 2024-10-17

## 2024-10-17 RX ADMIN — LIDOCAINE HYDROCHLORIDE 2 ML: 10 INJECTION, SOLUTION EPIDURAL; INFILTRATION; INTRACAUDAL; PERINEURAL at 10:30

## 2024-10-17 RX ADMIN — METHYLPREDNISOLONE ACETATE 1 ML: 80 INJECTION, SUSPENSION INTRA-ARTICULAR; INTRALESIONAL; INTRAMUSCULAR; SOFT TISSUE at 10:30

## 2024-10-17 NOTE — PROGRESS NOTES
Patient: Loyd Avalos  YOB: 1950  Date of Service: 10/17/2024    Chief Complaints:   Chief Complaint   Patient presents with    Left Knee - Pain    Right Knee - Pain       Subjective: Patient is followed with [facility] in the past desires conservative treatment to his knees for osteoarthritis    History of Present Illness: Pt is seen in the office today with complaints of   Chief Complaint   Patient presents with    Left Knee - Pain    Right Knee - Pain   .  KNEE: TIMING:  The pain is described as ACUTE on CHRONIC.  LOCATION: medial joint line tenderness. AGGRAVATING FACTORS:  Is worsened by prolonged standing, sitting, walking and squatting activities.  ASSOCIATED SYMPTOMS:  swelling, tenderness, and aching.    This problem is new to this examiner.     Allergies: No Known Allergies    Medications:   Home Medications:  Current Outpatient Medications on File Prior to Visit   Medication Sig    allopurinol (ZYLOPRIM) 300 MG tablet Take 1 tablet by mouth Daily.    atorvastatin (LIPITOR) 40 MG tablet Take 1 tablet by mouth Daily.    lisinopril (PRINIVIL,ZESTRIL) 20 MG tablet     metFORMIN (GLUCOPHAGE) 500 MG tablet Take 1 tablet by mouth Daily With Breakfast.    metoprolol succinate XL (TOPROL-XL) 100 MG 24 hr tablet Take 1 tablet by mouth Daily.    Multiple Vitamins-Minerals (MULTIVITAMIN ADULT PO) Take  by mouth.    warfarin (COUMADIN) 5 MG tablet Take 1 tablet by mouth Daily.    cyclobenzaprine (FLEXERIL) 10 MG tablet  (Patient not taking: Reported on 10/17/2024)    lisinopril (PRINIVIL,ZESTRIL) 10 MG tablet Take 1 tablet by mouth Daily. (Patient not taking: Reported on 10/17/2024)     No current facility-administered medications on file prior to visit.     Current Medications:  Scheduled Meds:  Continuous Infusions:No current facility-administered medications for this visit.    PRN Meds:.    I have reviewed the patient's medical history in detail and updated the computerized patient record.  Review  "and summarization of old records include:    Past Medical History:   Diagnosis Date    Atrial fibrillation     Atrial flutter     Diabetes     Gout     Heart disease     Status post pacemaker implantation    Hemochromatosis     History of syncope     Hyperlipidemia     Malignant neoplasm of prostate 2018    RBBB (right bundle branch block)     Sleep apnea     SSS (sick sinus syndrome)     SVT (supraventricular tachycardia)         Past Surgical History:   Procedure Laterality Date    CARDIAC ABLATION  2010    Paroxysmal atrial fib/Dr. Koo    CARDIAC ABLATION  2012    Recurrent atrial flutter/Dr. Koo    CARDIAC CATHETERIZATION      CARDIAC PACEMAKER PLACEMENT  2014    Medtronic dual chamber/Dr. Manriquez    KNEE SURGERY Right     PACEMAKER IMPLANTATION  05/2006        Social History     Occupational History     Employer: RETIRED   Tobacco Use    Smoking status: Some Days     Types: Cigars     Passive exposure: Current    Smokeless tobacco: Never   Vaping Use    Vaping status: Never Used   Substance and Sexual Activity    Alcohol use: Yes     Comment: Moderate \"few beers daily\"    Drug use: No    Sexual activity: Defer      Social History     Social History Narrative    Not on file        Family History   Problem Relation Age of Onset    Cancer Other         LYMPH & BREAST    Diabetes Other     Hypertension Other     Heart disease Other     Breast cancer Mother     Lymphoma Father     Hemochromatosis Father        ROS: 14 point review of systems was performed and was negative except for documented findings in HPI and today's encounter.     Allergies: No Known Allergies  Constitutional:  Denies fever, shaking or chills   Eyes:  Denies change in visual acuity   HENT:  Denies nasal congestion or sore throat   Respiratory:  Denies cough or shortness of breath   Cardiovascular:  Denies chest pain or severe LE edema   GI:  Denies abdominal pain, nausea, vomiting, bloody stools or diarrhea   Musculoskeletal:  Numbness, " tingling, or loss of motor function only as noted above in history of present illness.  : Denies painful urination or hematuria  Integument:  Denies rash, lesion or ulceration   Neurologic:  Denies headache or focal weakness  Endocrine:  Denies lymphadenopathy  Psych:  Denies confusion or change in mental status   Hem:  Denies active bleeding      Physical Exam: 74 y.o. male  Wt Readings from Last 3 Encounters:   10/17/24 93 kg (205 lb)   07/26/24 93.1 kg (205 lb 3.2 oz)   07/16/24 95.6 kg (210 lb 12.8 oz)       Body mass index is 32.11 kg/m².  Facility age limit for growth %grace is 20 years.  Vitals:    10/17/24 1011   Temp: 97.5 °F (36.4 °C)     Vital signs reviewed.   General Appearance:    Alert, cooperative, in no acute distress                  Eyes: conjunctiva clear  ENT: external ears and nose atraumatic  CV: no peripheral edema  Resp: normal respiratory effort  Skin: no rashes or wounds; normal turgor  Psych: mood and affect appropriate  Lymph: no nodes appreciated  Neuro: gross sensation intact  Vascular:  Palpable peripheral pulse in noted extremity  Musculoskeletal Extremities: KNEE Exam: medial and lateral joint line tenderness with crepitation, synovitis, swelling, and joint effusion bilateral knee.      Diagnostic Data:  Imaging done today and discussed with the patient:    Indication: pain related symptoms,  Views: 3V AP, LAT & 40 degree PA bilateral knee(s)   Findings: advanced arthritis  Comparison views: viewed last xray done in the office.      Procedure:  Large Joint Arthrocentesis: R knee  Date/Time: 10/17/2024 10:30 AM  Consent given by: patient  Site marked: site marked  Timeout: Immediately prior to procedure a time out was called to verify the correct patient, procedure, equipment, support staff and site/side marked as required   Supporting Documentation  Indications: pain   Procedure Details  Location: knee - R knee  Preparation: Patient was prepped and draped in the usual sterile  fashion  Needle gauge: 21G.  Medications administered: 1 mL methylPREDNISolone acetate 80 MG/ML; 2 mL lidocaine PF 1% 1 %  Patient tolerance: patient tolerated the procedure well with no immediate complications      Large Joint Arthrocentesis: L knee  Date/Time: 10/17/2024 10:30 AM  Consent given by: patient  Site marked: site marked  Timeout: Immediately prior to procedure a time out was called to verify the correct patient, procedure, equipment, support staff and site/side marked as required   Supporting Documentation  Indications: pain   Procedure Details  Location: knee - L knee  Preparation: Patient was prepped and draped in the usual sterile fashion  Needle gauge: 21G.  Medications administered: 1 mL methylPREDNISolone acetate 80 MG/ML; 2 mL lidocaine PF 1% 1 %  Patient tolerance: patient tolerated the procedure well with no immediate complications        Assessment:     ICD-10-CM ICD-9-CM   1. Pain  R52 780.96   2. Primary osteoarthritis of both knees  M17.0 715.16       Plan:   Follow up as indicated.  Ice, elevate, and rest as needed.  The diagnosis(es), natural history, pathophysiology and treatment for diagnosis(es) were discussed. Opportunity given and questions answered.  Biomechanics of pertinent body areas discussed.  When appropriate, the use of ambulatory aids discussed.  BMI:  The concept of BMI body mass index and its importance and implications discussed.    EXERCISES:  Advice on benefits of, and types of regular/moderate exercise pertaining to orthopedic diagnosis(es).  MEDICATIONS:  The risks, benefits, warnings,side effects and alternatives of medications discussed.  Inflammation/pain control; with cold, heat, elevation and/or liniments discussed as appropriate  Cortisone Injection. See procedure note.  HOME EXERCISE/PT program encouraged  MEDICAL RECORDS reviewed from other provider(s) for past and current medical history pertinent to this complaint.    10/17/2024

## 2024-11-08 ENCOUNTER — TELEPHONE (OUTPATIENT)
Dept: ONCOLOGY | Facility: CLINIC | Age: 74
End: 2024-11-08
Payer: MEDICARE

## 2024-11-08 NOTE — TELEPHONE ENCOUNTER
Caller: Loyd Avalos    Relationship to patient: Self    Best call back number: [phone]    Chief complaint: PATIENT CALLED TO RESCHEDULE     Type of visit: LAB AND INFUSION       If rescheduling, when is the original appointment: 11-8-24

## 2024-11-22 ENCOUNTER — INFUSION (OUTPATIENT)
Dept: ONCOLOGY | Facility: HOSPITAL | Age: 74
End: 2024-11-22
Payer: MEDICARE

## 2024-11-22 ENCOUNTER — LAB (OUTPATIENT)
Dept: LAB | Facility: HOSPITAL | Age: 74
End: 2024-11-22
Payer: MEDICARE

## 2024-11-22 VITALS
SYSTOLIC BLOOD PRESSURE: 108 MMHG | BODY MASS INDEX: 32.01 KG/M2 | OXYGEN SATURATION: 97 % | DIASTOLIC BLOOD PRESSURE: 77 MMHG | WEIGHT: 204.4 LBS | TEMPERATURE: 97.7 F | RESPIRATION RATE: 16 BRPM | HEART RATE: 75 BPM

## 2024-11-22 DIAGNOSIS — E83.110 HEREDITARY HEMOCHROMATOSIS: ICD-10-CM

## 2024-11-22 DIAGNOSIS — C61 MALIGNANT NEOPLASM OF PROSTATE: ICD-10-CM

## 2024-11-22 LAB
BASOPHILS # BLD AUTO: 0.04 10*3/MM3 (ref 0–0.2)
BASOPHILS NFR BLD AUTO: 0.4 % (ref 0–1.5)
DEPRECATED RDW RBC AUTO: 51.3 FL (ref 37–54)
EOSINOPHIL # BLD AUTO: 0.2 10*3/MM3 (ref 0–0.4)
EOSINOPHIL NFR BLD AUTO: 2.2 % (ref 0.3–6.2)
ERYTHROCYTE [DISTWIDTH] IN BLOOD BY AUTOMATED COUNT: 13.7 % (ref 12.3–15.4)
FERRITIN SERPL-MCNC: 98.4 NG/ML (ref 30–400)
HCT VFR BLD AUTO: 33.7 % (ref 37.5–51)
HGB BLD-MCNC: 11.3 G/DL (ref 13–17.7)
IMM GRANULOCYTES # BLD AUTO: 0.04 10*3/MM3 (ref 0–0.05)
IMM GRANULOCYTES NFR BLD AUTO: 0.4 % (ref 0–0.5)
LYMPHOCYTES # BLD AUTO: 2.95 10*3/MM3 (ref 0.7–3.1)
LYMPHOCYTES NFR BLD AUTO: 32.1 % (ref 19.6–45.3)
MCH RBC QN AUTO: 34.5 PG (ref 26.6–33)
MCHC RBC AUTO-ENTMCNC: 33.5 G/DL (ref 31.5–35.7)
MCV RBC AUTO: 102.7 FL (ref 79–97)
MONOCYTES # BLD AUTO: 0.74 10*3/MM3 (ref 0.1–0.9)
MONOCYTES NFR BLD AUTO: 8 % (ref 5–12)
NEUTROPHILS NFR BLD AUTO: 5.23 10*3/MM3 (ref 1.7–7)
NEUTROPHILS NFR BLD AUTO: 56.9 % (ref 42.7–76)
NRBC BLD AUTO-RTO: 0 /100 WBC (ref 0–0.2)
PLATELET # BLD AUTO: 217 10*3/MM3 (ref 140–450)
PMV BLD AUTO: 9 FL (ref 6–12)
RBC # BLD AUTO: 3.28 10*6/MM3 (ref 4.14–5.8)
WBC NRBC COR # BLD AUTO: 9.2 10*3/MM3 (ref 3.4–10.8)

## 2024-11-22 PROCEDURE — 85025 COMPLETE CBC W/AUTO DIFF WBC: CPT

## 2024-11-22 PROCEDURE — 82728 ASSAY OF FERRITIN: CPT

## 2024-11-22 PROCEDURE — 36415 COLL VENOUS BLD VENIPUNCTURE: CPT

## 2024-12-08 NOTE — PROGRESS NOTES
Pt presents for phlebo due to ferritin 79 on 7/31/19. Per ENA Soto, no need for CBC prior to phlebo today.   
No

## 2025-01-30 ENCOUNTER — OFFICE VISIT (OUTPATIENT)
Dept: ORTHOPEDIC SURGERY | Facility: CLINIC | Age: 75
End: 2025-01-30
Payer: MEDICARE

## 2025-01-30 VITALS — BODY MASS INDEX: 34.66 KG/M2 | HEIGHT: 64 IN | WEIGHT: 203 LBS | TEMPERATURE: 97.5 F

## 2025-01-30 DIAGNOSIS — M17.0 PRIMARY OSTEOARTHRITIS OF BOTH KNEES: Primary | ICD-10-CM

## 2025-01-30 RX ORDER — METHYLPREDNISOLONE ACETATE 80 MG/ML
80 INJECTION, SUSPENSION INTRA-ARTICULAR; INTRALESIONAL; INTRAMUSCULAR; SOFT TISSUE
Status: COMPLETED | OUTPATIENT
Start: 2025-01-30 | End: 2025-01-30

## 2025-01-30 RX ADMIN — METHYLPREDNISOLONE ACETATE 80 MG: 80 INJECTION, SUSPENSION INTRA-ARTICULAR; INTRALESIONAL; INTRAMUSCULAR; SOFT TISSUE at 11:56

## 2025-01-30 NOTE — PROGRESS NOTES
1/30/2025    Loyd Avalos is here today for worsening knee pain. Pt has undergone injection of the knee in the past with good resolution of symptoms. Pt is requesting a repeat injection.     KNEE Injection Procedure Note:    Large Joint Arthrocentesis: L knee  Date/Time: 1/30/2025 11:56 AM  Consent given by: patient  Site marked: site marked  Timeout: Immediately prior to procedure a time out was called to verify the correct patient, procedure, equipment, support staff and site/side marked as required   Supporting Documentation  Indications: pain and joint swelling   Procedure Details  Location: knee - L knee  Preparation: Patient was prepped and draped in the usual sterile fashion  Needle gauge: 21 G.  Approach: anterolateral  Medications administered: 2 mL lidocaine (cardiac); 80 mg methylPREDNISolone acetate 80 MG/ML  Patient tolerance: patient tolerated the procedure well with no immediate complications      Large Joint Arthrocentesis: R knee  Date/Time: 1/30/2025 11:56 AM  Consent given by: patient  Site marked: site marked  Timeout: Immediately prior to procedure a time out was called to verify the correct patient, procedure, equipment, support staff and site/side marked as required   Supporting Documentation  Indications: pain and joint swelling   Procedure Details  Location: knee - R knee  Preparation: Patient was prepped and draped in the usual sterile fashion  Needle gauge: 21 G.  Approach: anterolateral  Medications administered: 2 mL lidocaine (cardiac); 80 mg methylPREDNISolone acetate 80 MG/ML  Patient tolerance: patient tolerated the procedure well with no immediate complications      At the conclusion of the injection I discussed the importance of continued quad strengthening exercises on a daily basis. I will see the patient back if the symptoms should fail to improve or worsen.    Rachel Sánchez, APRN  1/30/2025

## 2025-04-01 ENCOUNTER — OFFICE VISIT (OUTPATIENT)
Dept: ONCOLOGY | Facility: CLINIC | Age: 75
End: 2025-04-01
Payer: MEDICARE

## 2025-04-01 ENCOUNTER — LAB (OUTPATIENT)
Dept: LAB | Facility: HOSPITAL | Age: 75
End: 2025-04-01
Payer: MEDICARE

## 2025-04-01 ENCOUNTER — APPOINTMENT (OUTPATIENT)
Dept: ONCOLOGY | Facility: HOSPITAL | Age: 75
End: 2025-04-01
Payer: MEDICARE

## 2025-04-01 VITALS
OXYGEN SATURATION: 95 % | BODY MASS INDEX: 35.17 KG/M2 | HEART RATE: 87 BPM | SYSTOLIC BLOOD PRESSURE: 109 MMHG | HEIGHT: 64 IN | DIASTOLIC BLOOD PRESSURE: 72 MMHG | TEMPERATURE: 98 F | WEIGHT: 206 LBS

## 2025-04-01 DIAGNOSIS — C61 MALIGNANT NEOPLASM OF PROSTATE: ICD-10-CM

## 2025-04-01 DIAGNOSIS — E83.110 HEREDITARY HEMOCHROMATOSIS: ICD-10-CM

## 2025-04-01 DIAGNOSIS — E83.110 HEREDITARY HEMOCHROMATOSIS: Primary | ICD-10-CM

## 2025-04-01 LAB
ALBUMIN SERPL-MCNC: 3.9 G/DL (ref 3.5–5.2)
ALBUMIN/GLOB SERPL: 1.2 G/DL
ALP SERPL-CCNC: 61 U/L (ref 39–117)
ALT SERPL W P-5'-P-CCNC: 26 U/L (ref 1–41)
ANION GAP SERPL CALCULATED.3IONS-SCNC: 12.8 MMOL/L (ref 5–15)
AST SERPL-CCNC: 23 U/L (ref 1–40)
BASOPHILS # BLD AUTO: 0.09 10*3/MM3 (ref 0–0.2)
BASOPHILS NFR BLD AUTO: 1 % (ref 0–1.5)
BILIRUB SERPL-MCNC: 0.5 MG/DL (ref 0–1.2)
BUN SERPL-MCNC: 16 MG/DL (ref 8–23)
BUN/CREAT SERPL: 14.3 (ref 7–25)
CALCIUM SPEC-SCNC: 10.1 MG/DL (ref 8.6–10.5)
CHLORIDE SERPL-SCNC: 103 MMOL/L (ref 98–107)
CO2 SERPL-SCNC: 26.2 MMOL/L (ref 22–29)
CREAT SERPL-MCNC: 1.12 MG/DL (ref 0.76–1.27)
DEPRECATED RDW RBC AUTO: 50.9 FL (ref 37–54)
EGFRCR SERPLBLD CKD-EPI 2021: 69.8 ML/MIN/1.73
EOSINOPHIL # BLD AUTO: 0.5 10*3/MM3 (ref 0–0.4)
EOSINOPHIL NFR BLD AUTO: 5.6 % (ref 0.3–6.2)
ERYTHROCYTE [DISTWIDTH] IN BLOOD BY AUTOMATED COUNT: 13.9 % (ref 12.3–15.4)
FERRITIN SERPL-MCNC: 93.3 NG/ML (ref 30–400)
GLOBULIN UR ELPH-MCNC: 3.2 GM/DL
GLUCOSE SERPL-MCNC: 104 MG/DL (ref 65–99)
HCT VFR BLD AUTO: 36.3 % (ref 37.5–51)
HGB BLD-MCNC: 12.1 G/DL (ref 13–17.7)
IMM GRANULOCYTES # BLD AUTO: 0.02 10*3/MM3 (ref 0–0.05)
IMM GRANULOCYTES NFR BLD AUTO: 0.2 % (ref 0–0.5)
LYMPHOCYTES # BLD AUTO: 2.89 10*3/MM3 (ref 0.7–3.1)
LYMPHOCYTES NFR BLD AUTO: 32.3 % (ref 19.6–45.3)
MCH RBC QN AUTO: 33.6 PG (ref 26.6–33)
MCHC RBC AUTO-ENTMCNC: 33.3 G/DL (ref 31.5–35.7)
MCV RBC AUTO: 100.8 FL (ref 79–97)
MONOCYTES # BLD AUTO: 0.84 10*3/MM3 (ref 0.1–0.9)
MONOCYTES NFR BLD AUTO: 9.4 % (ref 5–12)
NEUTROPHILS NFR BLD AUTO: 4.6 10*3/MM3 (ref 1.7–7)
NEUTROPHILS NFR BLD AUTO: 51.5 % (ref 42.7–76)
NRBC BLD AUTO-RTO: 0 /100 WBC (ref 0–0.2)
PLATELET # BLD AUTO: 206 10*3/MM3 (ref 140–450)
PMV BLD AUTO: 9.2 FL (ref 6–12)
POTASSIUM SERPL-SCNC: 4.9 MMOL/L (ref 3.5–5.2)
PROT SERPL-MCNC: 7.1 G/DL (ref 6–8.5)
RBC # BLD AUTO: 3.6 10*6/MM3 (ref 4.14–5.8)
SODIUM SERPL-SCNC: 142 MMOL/L (ref 136–145)
WBC NRBC COR # BLD AUTO: 8.94 10*3/MM3 (ref 3.4–10.8)

## 2025-04-01 PROCEDURE — 80053 COMPREHEN METABOLIC PANEL: CPT

## 2025-04-01 PROCEDURE — 85025 COMPLETE CBC W/AUTO DIFF WBC: CPT

## 2025-04-01 PROCEDURE — 36415 COLL VENOUS BLD VENIPUNCTURE: CPT

## 2025-04-01 PROCEDURE — 82728 ASSAY OF FERRITIN: CPT

## 2025-04-01 NOTE — PROGRESS NOTES
REASONS FOR FOLLOWUP:    Hemochromatosis and metastatic prostate cancer    HISTORY OF PRESENT ILLNESS:  The patient is a 74 y.o. year old male with hemochromatosis, undergoing phlebotomy for ferritin greater than 100.  He has been receiving phlebotomy every 6-8 months   He denies feeling any differently whether his ferritin is closer to 100 or higher.  He denies any recent changes to his health.  Continues to smoke cigars and get routine annual low-dose CT scans for follow-up by his primary care physician    Hemoglobin remains low normal at 12.1 today.  He has not had a phlebotomy since July 2024 and his ferritin is 93 today and he does not need phlebotomy      After radiation he has a recurrence in the retroperitoneum his PSA level is 0.0 and patient [facility].  Patient remains on Trelstar every 6 months.  He denies any recent changes.      He continues to smoke 1 cigar a day and stopped smoking 15 years ago   His dad did have small cell lung cancer related to smoking in his 60s    He is up-to-date with colonoscopy last one 2 years ago when he had polyps    Past Medical History:   Diagnosis Date    Atrial fibrillation     Atrial flutter     Diabetes     Gout     Heart disease     Status post pacemaker implantation    Hemochromatosis     History of syncope     Hyperlipidemia     Malignant neoplasm of prostate 2018    RBBB (right bundle branch block)     Sleep apnea     SSS (sick sinus syndrome)     SVT (supraventricular tachycardia)      Past Surgical History:   Procedure Laterality Date    CARDIAC ABLATION  2010    Paroxysmal atrial fib/Dr. Koo    CARDIAC ABLATION  2012    Recurrent atrial flutter/Dr. Koo    CARDIAC CATHETERIZATION      CARDIAC PACEMAKER PLACEMENT  2014    Medtronic dual chamber/Dr. Manriquez    KNEE SURGERY Right     PACEMAKER IMPLANTATION  05/2006       HEMATOLOGIC/ONCOLOGIC HISTORY:  (History from previous dates can be found in the separate document.)    11/19     The patient is a 69 y.o. year old male formally seen by Dr. Aquino in this office, with hemochromatosis on intermittent phlebotomy the patient has been undergoing phlebotomy roughly every 3 months during 2019.  His last two ferritin measurements were in the 90s.  Ferritin level today is 80.  He cannot stay today and wants to do 3 phlebotomies per year which is very reasonable     He is still smoking cigars and I try to talk him into stopping this  He drinks 6-8 beers a week and sometimes more  Is not had a colonoscopy recently and has not followed up with a cardiologist in a while And remains on Xarelto for his atrial fibrillation     5/21    As he is reviewed back today he tells me his PSA has been going up recently after his prostate cancer radiation 3 years ago and Dr. abdullahi biopsied his prostate and this was negative and did a CAT scan of the abdomen which showed a mildly enlarged lymph node in the periaortic area which is new PSA is up to 6.46     He told me  wanted to discuss the situation with him and I told him we would do an Axumin PET scan repeat his PSA and discussed with Dr. Abdullahi about Lupron with or without abiraterone    6/21  He is here to review the results of his Axumin PET scan which shows 1 heron metastasis in the IMV area  oligometastatic and he got his first dose of his ADT yesterday  And is scheduled to see Dr. Abdullahi in 6 weeks     11/21  Patient's last PET scan performed in May 2021 showed continued uptake locally in the pelvis but no other evidence of metastatic disease.  Additional imaging was canceled per Dr. Madrigal as it was not felt the patient needed to repeat PET scan at this time.  His PSA when last checked had improved from 8.4 down to 0.167.      MEDICATIONS    Current Outpatient Medications:     allopurinol (ZYLOPRIM) 300 MG tablet, Take 1 tablet by mouth Daily., Disp: , Rfl:     atorvastatin (LIPITOR) 40 MG tablet, Take 1 tablet by mouth Daily., Disp: , Rfl:     lisinopril  "(PRINIVIL,ZESTRIL) 20 MG tablet, , Disp: , Rfl:     metFORMIN (GLUCOPHAGE) 500 MG tablet, Take 1 tablet by mouth Daily With Breakfast., Disp: , Rfl:     metoprolol succinate XL (TOPROL-XL) 100 MG 24 hr tablet, Take 1 tablet by mouth Daily., Disp: , Rfl:     Multiple Vitamins-Minerals (MULTIVITAMIN ADULT PO), Take  by mouth., Disp: , Rfl:     warfarin (COUMADIN) 5 MG tablet, Take 1 tablet by mouth Daily., Disp: , Rfl:     cyclobenzaprine (FLEXERIL) 10 MG tablet, , Disp: , Rfl:     lisinopril (PRINIVIL,ZESTRIL) 10 MG tablet, Take 1 tablet by mouth Daily. (Patient not taking: Reported on 4/1/2025), Disp: , Rfl:     ALLERGIES:   No Known Allergies    SOCIAL HISTORY:       Social History     Socioeconomic History    Marital status:      Spouse name: Suzie    Years of education: High School   Tobacco Use    Smoking status: Some Days     Types: Cigars     Passive exposure: Current    Smokeless tobacco: Never   Vaping Use    Vaping status: Never Used   Substance and Sexual Activity    Alcohol use: Yes     Comment: Moderate \"few beers daily\"    Drug use: No    Sexual activity: Defer         FAMILY HISTORY:  Family History   Problem Relation Age of Onset    Cancer Other         LYMPH & BREAST    Diabetes Other     Hypertension Other     Heart disease Other     Breast cancer Mother     Lymphoma Father     Hemochromatosis Father        REVIEW OF SYSTEMS:  GENERAL: No change in appetite or weight; fatigue  Hot flashes on lupron.    SKIN: No nonhealing lesions.   No rashes.  HEME/LYMPH: No swollen nodes.   EYES: No vision changes or diplopia.   ENT: No tinnitus, hearing loss, gum bleeding, epistaxis, hoarseness or dysphagia.   RESPIRATORY: No cough, shortness of breath, hemoptysis or wheezing.   CVS: No chest pain, palpitations, orthopnea, dyspnea on exertion or PND.   GI: No melena or hematochezia.   No abdominal pain.  No nausea, vomiting, constipation, diarrhea  : No dysuria or hematuria.   MUSCULOSKELETAL: Chronic " "arthralgias.    NEUROLOGICAL: No global weakness, loss of consciousness or seizures.   ROS reviewed 04/01/25    Objective    Vitals:    04/01/25 1329   BP: 109/72   Pulse: 87   Temp: 98 °F (36.7 °C)   TempSrc: Oral   SpO2: 95%   Weight: 93.4 kg (206 lb)   Height: 162.6 cm (64.02\")   PainSc: 0-No pain         4/1/2025     1:30 PM   Current Status   ECOG score 0       PHYSICAL EXAM:    GENERAL:  Well-developed, well-nourished in no acute distress.   SKIN:  Warm, dry without rashes, purpura or petechiae.  EYES:  Pupils equal, round and reactive to light.  EOMs intact.  Conjunctivae normal.  EARS:  Hearing intact.  CHEST:  Lungs clear to auscultation. Good airflow.  CARDIAC:  Regular rate and rhythm.  EXTREMITIES:  No clubbing, cyanosis or edema.  NEUROLOGICAL:  No focal neurological deficits.  PSYCHIATRIC: Agitated affect and mood.     I have reexamined the patient and the results are consistent with the previously documented exam. Jewel Morales MD       RECENT LABS:  Results from last 7 days   Lab Units 04/01/25  1311   WBC 10*3/mm3 8.94   NEUTROS ABS 10*3/mm3 4.60   HEMOGLOBIN g/dL 12.1*   HEMATOCRIT % 36.3*   PLATELETS 10*3/mm3 206     Results from last 7 days   Lab Units 04/01/25  1311   SODIUM mmol/L 142   POTASSIUM mmol/L 4.9   CHLORIDE mmol/L 103   CO2 mmol/L 26.2   BUN mg/dL 16   CREATININE mg/dL 1.12   CALCIUM mg/dL 10.1   ALBUMIN g/dL 3.9   BILIRUBIN mg/dL 0.5   ALK PHOS U/L 61   ALT (SGPT) U/L 26   AST (SGOT) U/L 23   GLUCOSE mg/dL 104*   FERRITIN ng/mL 93.30                 Axumin PET  IMPRESSION:  Solitary mildly enlarged lymph node in the posterior aspect  of the pelvis in the presacral region along the course of the inferior  mesenteric vessels showing significant uptake of fluciclovine consistent  with localized metastatic disease. There is no evidence of metastatic  disease elsewhere within the neck, chest, abdomen or pelvis.     This report was finalized on 5/20/2021    Assessment & Plan  "     ASSESSMENT:  1. Hereditary hemochromatosis:   Patient historically undergoing phlebotomy 2-3 times a year.    Most recent ferritin level checked in May 2021, 172 leading to phlebotomy at that time.   Ferritin has been ordered today, however pending when the patient was here and he did not wish to wait.  It has returned now, elevated at 277.  He will need to return for phlebotomy.  He is already scheduled back in 3 months with Dr. Morales and we will repeat ferritin at that time.  He likely needs to be having phlebotomies more than every 6 months at this point.  Patient returns 10/6/2022 due for possible phlebotomy with ferritin pending.  Patient does not wish to wait for the ferritin in the past once again is asking to proceed with phlebotomy today.  Ferritin is currently pending and being ran stat.  Hemoglobin today is 14.  Most recently his hemoglobin has been less than 14 with ferritin in the 140 range.  We will proceed with phlebotomy today.    2.  Cigar use-we have previously discussed smoking cessation    3.  Atrial fibrillation, on warfarin, managed per cardiology.     4.  Sick sinus syndrome    5.  Prostate cancer Crystal 3+3 post radiation 2018 with rising PSA-in 2020  Prostate biopsy negative in 2 /21   CT scan with enlarged IMV chain lymph node measuring 11 mm PSA 8.5-4/21  Axumin PET positive in this lymph node-local recurrence  ADT initiated by urology  Radiation given focused to the lymph node which was PET positive  PSA decreased to 0.167 as of August 2021  Patient continues [facility], due to be seen in early December 2021.  He continues on Trelstar injections.   PSA 0.043 in 2/22  10/6/2022 patient reports no change in treatment plan per urology currently.  PSA pending today.  PSA remains undetectable per the patient as of 4/25    PLAN:  Return in 4 months for CBC, ferritin, phlebotomy  Return in 8 months for CBC, ferritin, ,   Phlebotomize if ferritin greater than 100.  Patient  does continue follow-up with [facility] and is receiving Trelstar.  See me in 1 year-low-dose chest CT per primary care

## 2025-05-08 ENCOUNTER — OFFICE VISIT (OUTPATIENT)
Dept: ORTHOPEDIC SURGERY | Facility: CLINIC | Age: 75
End: 2025-05-08
Payer: MEDICARE

## 2025-05-08 VITALS — TEMPERATURE: 97.8 F | BODY MASS INDEX: 32.3 KG/M2 | HEIGHT: 66 IN | WEIGHT: 201 LBS

## 2025-05-08 DIAGNOSIS — M17.0 PRIMARY OSTEOARTHRITIS OF BOTH KNEES: ICD-10-CM

## 2025-05-08 DIAGNOSIS — R52 PAIN: Primary | ICD-10-CM

## 2025-05-08 RX ORDER — METHYLPREDNISOLONE ACETATE 80 MG/ML
80 INJECTION, SUSPENSION INTRA-ARTICULAR; INTRALESIONAL; INTRAMUSCULAR; SOFT TISSUE
Status: COMPLETED | OUTPATIENT
Start: 2025-05-08 | End: 2025-05-08

## 2025-05-08 RX ADMIN — METHYLPREDNISOLONE ACETATE 80 MG: 80 INJECTION, SUSPENSION INTRA-ARTICULAR; INTRALESIONAL; INTRAMUSCULAR; SOFT TISSUE at 14:11

## 2025-05-08 RX ADMIN — METHYLPREDNISOLONE ACETATE 80 MG: 80 INJECTION, SUSPENSION INTRA-ARTICULAR; INTRALESIONAL; INTRAMUSCULAR; SOFT TISSUE at 14:10

## 2025-05-08 NOTE — PROGRESS NOTES
5/8/2025    Loyd Avalos is here today for worsening knee pain. Pt has undergone injection of the knee in the past with good resolution of symptoms. Pt is requesting a repeat injection.     KNEE Injection Procedure Note:    Large Joint Arthrocentesis: L knee  Date/Time: 5/8/2025 2:10 PM  Consent given by: patient  Site marked: site marked  Timeout: Immediately prior to procedure a time out was called to verify the correct patient, procedure, equipment, support staff and site/side marked as required   Supporting Documentation  Indications: pain and joint swelling   Procedure Details  Location: knee - L knee  Preparation: Patient was prepped and draped in the usual sterile fashion  Needle gauge: 21 G.  Approach: anterolateral  Medications administered: 2 mL lidocaine (cardiac); 80 mg methylPREDNISolone acetate 80 MG/ML  Patient tolerance: patient tolerated the procedure well with no immediate complications      Large Joint Arthrocentesis: R knee  Date/Time: 5/8/2025 2:11 PM  Consent given by: patient  Site marked: site marked  Timeout: Immediately prior to procedure a time out was called to verify the correct patient, procedure, equipment, support staff and site/side marked as required   Supporting Documentation  Indications: pain and joint swelling   Procedure Details  Location: knee - R knee  Preparation: Patient was prepped and draped in the usual sterile fashion  Needle gauge: 21 G.  Approach: anterolateral  Medications administered: 2 mL lidocaine (cardiac); 80 mg methylPREDNISolone acetate 80 MG/ML  Patient tolerance: patient tolerated the procedure well with no immediate complications      3 views bilateral knees done for pain with comparison films show tricompartmental osteoarthritis  At the conclusion of the injection I discussed the importance of continued quad strengthening exercises on a daily basis. I will see the patient back if the symptoms should fail to improve or worsen.    Rachel Sánchez,  APRN  5/8/2025

## 2025-05-16 ENCOUNTER — OFFICE VISIT (OUTPATIENT)
Dept: ORTHOPEDIC SURGERY | Facility: CLINIC | Age: 75
End: 2025-05-16
Payer: MEDICARE

## 2025-05-16 VITALS — WEIGHT: 202.2 LBS | HEIGHT: 66 IN | TEMPERATURE: 97.9 F | BODY MASS INDEX: 32.5 KG/M2

## 2025-05-16 DIAGNOSIS — M17.10 ARTHRITIS OF KNEE: Primary | ICD-10-CM

## 2025-05-16 NOTE — PROGRESS NOTES
Patient:Loyd Avalos    YOB: 1950    Medical Record Number:[id number]    Chief Complaints:  Referral for bilateral knee arthritis    History of Present Illness:     74 y.o. male patient who presents for evaluation of bilateral knee arthritis.  He was referred by Rachel.  He reports a multiyear history of worsening pain and dysfunction.  Current pain is described as mild and aching but he just got injections about a week ago.  He says that the injections typically last a couple of months.  He feels like the left knee injection is already starting to wear off.  Most of his pain is medial.  Denies any other complaints or issues.  In the past, he has tried activity modifications, anti-inflammatories, Visco injections and cortisone injections.      Allergies:No Known Allergies      Current Outpatient Medications:     allopurinol (ZYLOPRIM) 300 MG tablet, Take 1 tablet by mouth Daily., Disp: , Rfl:     atorvastatin (LIPITOR) 40 MG tablet, Take 1 tablet by mouth Daily., Disp: , Rfl:     lisinopril (PRINIVIL,ZESTRIL) 20 MG tablet, , Disp: , Rfl:     metFORMIN (GLUCOPHAGE) 500 MG tablet, Take 1 tablet by mouth Daily With Breakfast., Disp: , Rfl:     metoprolol succinate XL (TOPROL-XL) 100 MG 24 hr tablet, Take 1 tablet by mouth Daily., Disp: , Rfl:     Multiple Vitamins-Minerals (MULTIVITAMIN ADULT PO), Take  by mouth., Disp: , Rfl:     warfarin (COUMADIN) 5 MG tablet, Take 1 tablet by mouth Daily., Disp: , Rfl:     cyclobenzaprine (FLEXERIL) 10 MG tablet, , Disp: , Rfl:     lisinopril (PRINIVIL,ZESTRIL) 10 MG tablet, Take 1 tablet by mouth Daily. (Patient not taking: Reported on 5/16/2025), Disp: , Rfl:     Past Medical History:   Diagnosis Date    Atrial fibrillation     Atrial flutter     Diabetes     Gout     Heart disease     Status post pacemaker implantation    Hemochromatosis     History of syncope     Hyperlipidemia     Malignant neoplasm of prostate 2018    RBBB (right bundle branch block)      "Sleep apnea     SSS (sick sinus syndrome)     SVT (supraventricular tachycardia)        Past Surgical History:   Procedure Laterality Date    CARDIAC ABLATION  2010    Paroxysmal atrial fib/Dr. Koo    CARDIAC ABLATION  2012    Recurrent atrial flutter/Dr. Koo    CARDIAC CATHETERIZATION      CARDIAC PACEMAKER PLACEMENT  2014    Medtronic dual chamber/Dr. Manriquez    KNEE SURGERY Right     PACEMAKER IMPLANTATION  05/2006       Social History     Occupational History     Employer: RETIRED   Tobacco Use    Smoking status: Some Days     Types: Cigars     Passive exposure: Current    Smokeless tobacco: Never   Vaping Use    Vaping status: Never Used   Substance and Sexual Activity    Alcohol use: Yes     Comment: Moderate \"few beers daily\"    Drug use: No    Sexual activity: Defer      Social History     Social History Narrative    Not on file       Family History   Problem Relation Age of Onset    Cancer Other         LYMPH & BREAST    Diabetes Other     Hypertension Other     Heart disease Other     Breast cancer Mother     Lymphoma Father     Hemochromatosis Father        Review of Systems:      Constitutional: Denies fever, shaking or chills   Eyes: Denies change in visual acuity   HEENT: Denies nasal congestion or sore throat   Respiratory: Denies cough or shortness of breath   Cardiovascular: Denies chest pain or edema  Endocrine: Denies tremors, palpitations, intolerance of heat or cold, polyuria, polydipsia.  GI: Denies abdominal pain, nausea, vomiting, bloody stools or diarrhea  : Denies frequency, urgency, incontinence, retention, or nocturia.  Musculoskeletal: Denies numbness, tingling or loss of motor function except as above  Integument: Denies rash, lesion or ulceration   Neurologic: Denies headache or focal weakness, deficits  Heme: Denies spontaneous or excessive bleeding, epistaxis, hematuria, melena, fatigue, enlarged or tender lymph nodes.      All other pertinent positives and negatives as noted " "above in HPI.    Physical Exam:74 y.o. male  Vitals:    05/16/25 1413   Temp: 97.9 °F (36.6 °C)   Weight: 91.7 kg (202 lb 3.2 oz)   Height: 167.6 cm (66\")     General:  Patient is awake and alert.  Appears in no acute distress or discomfort.    Psych:  Affect and demeanor are appropriate.    Extremities: Both knees were examined.  Skin is benign.  Mild medial joint line tenderness bilaterally.  Overall his motion is pretty good.  No instability.  Intact motor and sensory function throughout both legs and feet.  Brisk capillary refill.    Imaging: Previous bilateral knee x-rays are reviewed.  He appears to have bone-on-bone medial compartment osteoarthritis on the right along with moderate patellofemoral arthritis.  On the left, he has joint space narrowing and subchondral sclerosis consistent with mild to moderate medial and patellofemoral compartment osteoarthritis.    Assessment/Plan:  Bilateral knee osteoarthritis    We had a long discussion regarding the natural history of this condition and his options.  We thoroughly discussed surgical and nonsurgical treatment.  He has not yet tried PT.  I told him that would be a good option and I gave him a referral.  He is potential for arthroplasty but there is certainly no hurry to pursue that option.  All of his questions were answered in detail.  He will follow-up as needed.    Ryan Carolina MD    05/16/2025  "

## 2025-07-22 ENCOUNTER — TELEPHONE (OUTPATIENT)
Dept: ONCOLOGY | Facility: CLINIC | Age: 75
End: 2025-07-22
Payer: MEDICARE

## 2025-07-22 NOTE — TELEPHONE ENCOUNTER
Returned pts call and explained we can't do the phlebo's in the morning, it will have to be in the afternoon. Pt r/s to 07-31-25 at 12:20 for lab and phlebo

## 2025-07-22 NOTE — TELEPHONE ENCOUNTER
Caller: Loyd Avalos    Relationship to patient: Self    Best call back number: [phone]     Chief complaint: R/S    Type of visit: LAB/PHLEBOTOMY    Requested date: PLEASE CALL PT TO R/S FOR A DIFFERENT DAY, AND WILL WANT EARLIER TIME ALSO     If rescheduling, when is the original appointment: 7/23 AT 2:30

## 2025-07-25 RX ORDER — SODIUM CHLORIDE 9 MG/ML
250 INJECTION, SOLUTION INTRAVENOUS ONCE
OUTPATIENT
Start: 2025-07-25

## 2025-07-31 ENCOUNTER — LAB (OUTPATIENT)
Dept: LAB | Facility: HOSPITAL | Age: 75
End: 2025-07-31
Payer: MEDICARE

## 2025-07-31 ENCOUNTER — INFUSION (OUTPATIENT)
Dept: ONCOLOGY | Facility: HOSPITAL | Age: 75
End: 2025-07-31
Payer: MEDICARE

## 2025-07-31 DIAGNOSIS — C61 MALIGNANT NEOPLASM OF PROSTATE: ICD-10-CM

## 2025-07-31 DIAGNOSIS — E83.110 HEREDITARY HEMOCHROMATOSIS: ICD-10-CM

## 2025-07-31 LAB
BASOPHILS # BLD AUTO: 0.08 10*3/MM3 (ref 0–0.2)
BASOPHILS NFR BLD AUTO: 0.9 % (ref 0–1.5)
DEPRECATED RDW RBC AUTO: 51.8 FL (ref 37–54)
EOSINOPHIL # BLD AUTO: 0.47 10*3/MM3 (ref 0–0.4)
EOSINOPHIL NFR BLD AUTO: 5.1 % (ref 0.3–6.2)
ERYTHROCYTE [DISTWIDTH] IN BLOOD BY AUTOMATED COUNT: 14 % (ref 12.3–15.4)
FERRITIN SERPL-MCNC: 119 NG/ML (ref 30–400)
HCT VFR BLD AUTO: 35.1 % (ref 37.5–51)
HGB BLD-MCNC: 11.5 G/DL (ref 13–17.7)
IMM GRANULOCYTES # BLD AUTO: 0.02 10*3/MM3 (ref 0–0.05)
IMM GRANULOCYTES NFR BLD AUTO: 0.2 % (ref 0–0.5)
LYMPHOCYTES # BLD AUTO: 3.07 10*3/MM3 (ref 0.7–3.1)
LYMPHOCYTES NFR BLD AUTO: 33.3 % (ref 19.6–45.3)
MCH RBC QN AUTO: 33.1 PG (ref 26.6–33)
MCHC RBC AUTO-ENTMCNC: 32.8 G/DL (ref 31.5–35.7)
MCV RBC AUTO: 101.2 FL (ref 79–97)
MONOCYTES # BLD AUTO: 0.89 10*3/MM3 (ref 0.1–0.9)
MONOCYTES NFR BLD AUTO: 9.7 % (ref 5–12)
NEUTROPHILS NFR BLD AUTO: 4.69 10*3/MM3 (ref 1.7–7)
NEUTROPHILS NFR BLD AUTO: 50.8 % (ref 42.7–76)
NRBC BLD AUTO-RTO: 0 /100 WBC (ref 0–0.2)
PLATELET # BLD AUTO: 242 10*3/MM3 (ref 140–450)
PMV BLD AUTO: 8.7 FL (ref 6–12)
RBC # BLD AUTO: 3.47 10*6/MM3 (ref 4.14–5.8)
WBC NRBC COR # BLD AUTO: 9.22 10*3/MM3 (ref 3.4–10.8)

## 2025-07-31 PROCEDURE — 36415 COLL VENOUS BLD VENIPUNCTURE: CPT

## 2025-07-31 PROCEDURE — 82728 ASSAY OF FERRITIN: CPT

## 2025-07-31 PROCEDURE — 85025 COMPLETE CBC W/AUTO DIFF WBC: CPT

## 2025-07-31 NOTE — NURSING NOTE
Pt's ferritin 119 today, goal <100, but hgb 11.5. R/w NP will hold phlebo d/t hgb at this time. R/w pt and pt v/u.       Lab Results   Component Value Date    WBC 9.22 07/31/2025    HGB 11.5 (L) 07/31/2025    HCT 35.1 (L) 07/31/2025    .2 (H) 07/31/2025     07/31/2025     Lab Results   Component Value Date    FERRITIN 119.00 07/31/2025